# Patient Record
Sex: FEMALE | Race: WHITE | NOT HISPANIC OR LATINO | Employment: FULL TIME | URBAN - METROPOLITAN AREA
[De-identification: names, ages, dates, MRNs, and addresses within clinical notes are randomized per-mention and may not be internally consistent; named-entity substitution may affect disease eponyms.]

---

## 2021-03-19 PROBLEM — U07.1 PNEUMONIA DUE TO COVID-19 VIRUS: Status: ACTIVE | Noted: 2021-03-19

## 2021-03-19 PROBLEM — J12.82 PNEUMONIA DUE TO COVID-19 VIRUS: Status: ACTIVE | Noted: 2021-03-19

## 2023-12-22 ENCOUNTER — TELEPHONE (OUTPATIENT)
Dept: SURGERY | Facility: CLINIC | Age: 48
End: 2023-12-22
Payer: OTHER GOVERNMENT

## 2023-12-22 NOTE — TELEPHONE ENCOUNTER
"Spoke to pt about her "self made" appt, and she said that its regarding a colonoscopy she recently had at [facility], that is showing cancer. She will try to upload everything in portal, and if it doesn't load she will bring everything with her to appt.   "

## 2023-12-26 ENCOUNTER — TELEPHONE (OUTPATIENT)
Dept: SURGERY | Facility: CLINIC | Age: 48
End: 2023-12-26

## 2023-12-26 ENCOUNTER — TELEPHONE (OUTPATIENT)
Dept: SURGERY | Facility: CLINIC | Age: 48
End: 2023-12-26
Payer: OTHER GOVERNMENT

## 2023-12-26 ENCOUNTER — LAB VISIT (OUTPATIENT)
Dept: LAB | Facility: HOSPITAL | Age: 48
End: 2023-12-26
Attending: COLON & RECTAL SURGERY
Payer: OTHER GOVERNMENT

## 2023-12-26 ENCOUNTER — OFFICE VISIT (OUTPATIENT)
Dept: SURGERY | Facility: CLINIC | Age: 48
End: 2023-12-26
Payer: OTHER GOVERNMENT

## 2023-12-26 ENCOUNTER — PATIENT MESSAGE (OUTPATIENT)
Dept: SURGERY | Facility: CLINIC | Age: 48
End: 2023-12-26

## 2023-12-26 VITALS
WEIGHT: 175.94 LBS | TEMPERATURE: 98 F | OXYGEN SATURATION: 97 % | HEART RATE: 76 BPM | RESPIRATION RATE: 16 BRPM | DIASTOLIC BLOOD PRESSURE: 70 MMHG | SYSTOLIC BLOOD PRESSURE: 118 MMHG | BODY MASS INDEX: 25.19 KG/M2 | HEIGHT: 70 IN

## 2023-12-26 DIAGNOSIS — C18.7 MALIGNANT NEOPLASM OF SIGMOID COLON: Primary | ICD-10-CM

## 2023-12-26 DIAGNOSIS — Z85.42 HISTORY OF UTERINE CANCER: ICD-10-CM

## 2023-12-26 DIAGNOSIS — C18.7 MALIGNANT NEOPLASM OF SIGMOID COLON: ICD-10-CM

## 2023-12-26 LAB
ALBUMIN SERPL BCP-MCNC: 4.1 G/DL (ref 3.5–5.2)
ALP SERPL-CCNC: 65 U/L (ref 55–135)
ALT SERPL W/O P-5'-P-CCNC: 15 U/L (ref 10–44)
ANION GAP SERPL CALC-SCNC: 10 MMOL/L (ref 8–16)
AST SERPL-CCNC: 26 U/L (ref 10–40)
BASOPHILS # BLD AUTO: 0.02 K/UL (ref 0–0.2)
BASOPHILS NFR BLD: 0.4 % (ref 0–1.9)
BILIRUB SERPL-MCNC: 0.8 MG/DL (ref 0.1–1)
BUN SERPL-MCNC: 13 MG/DL (ref 6–20)
CALCIUM SERPL-MCNC: 9.2 MG/DL (ref 8.7–10.5)
CEA SERPL-MCNC: 2.2 NG/ML (ref 0–5)
CHLORIDE SERPL-SCNC: 107 MMOL/L (ref 95–110)
CO2 SERPL-SCNC: 25 MMOL/L (ref 23–29)
CREAT SERPL-MCNC: 0.9 MG/DL (ref 0.5–1.4)
DIFFERENTIAL METHOD: ABNORMAL
EOSINOPHIL # BLD AUTO: 0.1 K/UL (ref 0–0.5)
EOSINOPHIL NFR BLD: 1.1 % (ref 0–8)
ERYTHROCYTE [DISTWIDTH] IN BLOOD BY AUTOMATED COUNT: 12.7 % (ref 11.5–14.5)
EST. GFR  (NO RACE VARIABLE): >60 ML/MIN/1.73 M^2
GLUCOSE SERPL-MCNC: 99 MG/DL (ref 70–110)
HCT VFR BLD AUTO: 38.2 % (ref 37–48.5)
HGB BLD-MCNC: 12.9 G/DL (ref 12–16)
IMM GRANULOCYTES # BLD AUTO: 0.01 K/UL (ref 0–0.04)
IMM GRANULOCYTES NFR BLD AUTO: 0.2 % (ref 0–0.5)
LYMPHOCYTES # BLD AUTO: 0.9 K/UL (ref 1–4.8)
LYMPHOCYTES NFR BLD: 16.9 % (ref 18–48)
MCH RBC QN AUTO: 29.6 PG (ref 27–31)
MCHC RBC AUTO-ENTMCNC: 33.8 G/DL (ref 32–36)
MCV RBC AUTO: 88 FL (ref 82–98)
MONOCYTES # BLD AUTO: 0.4 K/UL (ref 0.3–1)
MONOCYTES NFR BLD: 7.1 % (ref 4–15)
NEUTROPHILS # BLD AUTO: 3.9 K/UL (ref 1.8–7.7)
NEUTROPHILS NFR BLD: 74.3 % (ref 38–73)
NRBC BLD-RTO: 0 /100 WBC
PLATELET # BLD AUTO: 282 K/UL (ref 150–450)
PMV BLD AUTO: 9.6 FL (ref 9.2–12.9)
POTASSIUM SERPL-SCNC: 4.3 MMOL/L (ref 3.5–5.1)
PROT SERPL-MCNC: 6.9 G/DL (ref 6–8.4)
RBC # BLD AUTO: 4.36 M/UL (ref 4–5.4)
SODIUM SERPL-SCNC: 142 MMOL/L (ref 136–145)
WBC # BLD AUTO: 5.22 K/UL (ref 3.9–12.7)

## 2023-12-26 PROCEDURE — 80053 COMPREHEN METABOLIC PANEL: CPT | Mod: PN | Performed by: COLON & RECTAL SURGERY

## 2023-12-26 PROCEDURE — 99204 PR OFFICE/OUTPT VISIT, NEW, LEVL IV, 45-59 MIN: ICD-10-PCS | Mod: S$GLB,,, | Performed by: COLON & RECTAL SURGERY

## 2023-12-26 PROCEDURE — 85025 COMPLETE CBC W/AUTO DIFF WBC: CPT | Mod: PN | Performed by: COLON & RECTAL SURGERY

## 2023-12-26 PROCEDURE — 36415 COLL VENOUS BLD VENIPUNCTURE: CPT | Mod: PN | Performed by: COLON & RECTAL SURGERY

## 2023-12-26 PROCEDURE — 99999 PR PBB SHADOW E&M-EST. PATIENT-LVL III: CPT | Mod: PBBFAC,,, | Performed by: COLON & RECTAL SURGERY

## 2023-12-26 PROCEDURE — 82378 CARCINOEMBRYONIC ANTIGEN: CPT | Performed by: COLON & RECTAL SURGERY

## 2023-12-26 PROCEDURE — 99204 OFFICE O/P NEW MOD 45 MIN: CPT | Mod: S$GLB,,, | Performed by: COLON & RECTAL SURGERY

## 2023-12-26 PROCEDURE — 99999 PR PBB SHADOW E&M-EST. PATIENT-LVL III: ICD-10-PCS | Mod: PBBFAC,,, | Performed by: COLON & RECTAL SURGERY

## 2023-12-26 RX ORDER — NITROFURANTOIN 25; 75 MG/1; MG/1
100 CAPSULE ORAL 2 TIMES DAILY
COMMUNITY
Start: 2023-09-07

## 2023-12-26 RX ORDER — POLYETHYLENE GLYCOL 3350, SODIUM SULFATE ANHYDROUS, SODIUM BICARBONATE, SODIUM CHLORIDE, POTASSIUM CHLORIDE 236; 22.74; 6.74; 5.86; 2.97 G/4L; G/4L; G/4L; G/4L; G/4L
4 POWDER, FOR SOLUTION ORAL ONCE
COMMUNITY
End: 2023-12-26 | Stop reason: SDUPTHER

## 2023-12-26 RX ORDER — POLYETHYLENE GLYCOL 3350, SODIUM SULFATE ANHYDROUS, SODIUM BICARBONATE, SODIUM CHLORIDE, POTASSIUM CHLORIDE 236; 22.74; 6.74; 5.86; 2.97 G/4L; G/4L; G/4L; G/4L; G/4L
4 POWDER, FOR SOLUTION ORAL ONCE
Qty: 4000 ML | Refills: 0 | Status: SHIPPED | OUTPATIENT
Start: 2023-12-26 | End: 2024-01-08

## 2023-12-26 RX ORDER — ESTRADIOL 10 UG/1
INSERT VAGINAL
COMMUNITY
Start: 2023-09-07

## 2023-12-26 NOTE — PROGRESS NOTES
CRS Office/In-Patient History and Physical    Referring MD:   Self, Aaareferral  No address on file    SUBJECTIVE:     History of Present Illness:  Patient is a 48 y.o. female with Hx of uterine cancer at age 24 managed with RT followed by  lymphadenectomy and relocation of ovaries (1999). Lymph nodes + cancer and she tolerated postoperative chemotherapy with cure. She had a normal colonoscopy in 2018, but + flat adenomatous lesion at 30-40 cm noted in September 2022, not able to be safely removed by endoscopy- not a candidate for EMR/ESD. Recent repeat Bx confirmed invasive carcinoma, Tis. She had previously been turned down for elective resection and now presents for a 2nd opinion. She has CT scan, labs and Hem/Onc referral locally in January. Family Hx + CRC in Grandfather at age 67. Medical records hand-delivered by the patient.     Last Colonoscopy: 2023      Past Medical History:   Diagnosis Date    Cervical cancer        Past Surgical History:   Procedure Laterality Date    CHOLECYSTECTOMY      HYSTERECTOMY         Review of patient's allergies indicates:   Allergen Reactions    Codeine Other (See Comments)     Per patient, causes pancreatitis       Current Outpatient Medications on File Prior to Visit   Medication Sig Dispense Refill    nitrofurantoin, macrocrystal-monohydrate, (MACROBID) 100 MG capsule Take 100 mg by mouth 2 (two) times daily.      VAGIFEM 10 mcg Tab Place vaginally.      [DISCONTINUED] colchicine (COLCRYS) 0.6 mg tablet Take 1 tablet (0.6 mg total) by mouth once daily. 14 tablet 0    [DISCONTINUED] lopinavir-ritonavir 200-50 mg (KALETRA) 200-50 mg Tab Take 2 tablets by mouth once daily. 7 tablet 0    [DISCONTINUED] methylPREDNISolone (MEDROL DOSEPACK) 4 mg tablet As directed on dose pack 1 Package 0     No current facility-administered medications on file prior to visit.       No family history on file.    Social History     Tobacco Use    Smoking status: Never    Smokeless tobacco: Never  "  Substance Use Topics    Drug use: Never        Review of Systems:  Constitutional: Negative for fever, appetite change and unexpected weight change.  HENT: Negative for sore throat and trouble swallowing.  Eyes: Negative for visual disturbance.  Respiratory: Negative for chest tightness, shortness of breath and wheezing.  Cardiovascular: Negative for chest pain.  Gastrointestinal:  as per HPI  Genitourinary: Negative for dysuria, frequency and hematuria.  Musculoskeletal: Negative for myalgias, joint swelling and arthralgias.  Skin: Negative for color change and rash.   Neurological: Negative for syncope, weakness and headaches.  Psychiatric/Behavioral: Negative for confusion.      OBJECTIVE:     Vital Signs (Most Recent)  /70 (BP Location: Left arm, Patient Position: Sitting, BP Method: Medium (Manual))   Pulse 76   Temp 97.7 °F (36.5 °C) (Temporal)   Resp 16   Ht 5' 10" (1.778 m)   Wt 79.8 kg (175 lb 14.8 oz)   SpO2 97%   BMI 25.24 kg/m²            Physical Exam:  GENERAL:  Comfortable, in no acute distress.      SKIN: Non-jaundiced  EXTREMITIES:  No edema.     NEURO: CN II-XII intact; motor/sensory non-focal.                            HEENT EXAM:  Nonicteric.  No adenopathy.  Oropharynx is clear.               NECK:  Supple.                                                               LUNGS:  Clear.                                                               CARDIAC:  Regular rate and rhythm.  S1, S2.  No murmur.                      ABDOMEN:  Soft, positive bowel sounds, nontender.  No hepatosplenomegaly or masses.  No rebound or guarding.  + Pfannenstiel scar and laparoscopy scars                                                  ASSESSMENT:     Encounter Diagnoses   Name Primary?    Malignant neoplasm of sigmoid colon Yes    History of uterine cancer           PLAN:   Agree with plan for surgical resection for this apparent early-stage sigmoid cancer (Tis) perhaps combined with TAHBSO. " Referral to Genetics for work-up (? Godwin Syndrome) along with GYN Onc for consideration of prophylactic resection of GYN organs. Labs drawn today. We will make the appropriate arrangements for her.

## 2023-12-27 ENCOUNTER — PATIENT MESSAGE (OUTPATIENT)
Dept: HEMATOLOGY/ONCOLOGY | Facility: CLINIC | Age: 48
End: 2023-12-27
Payer: OTHER GOVERNMENT

## 2023-12-27 ENCOUNTER — TELEPHONE (OUTPATIENT)
Dept: SURGERY | Facility: CLINIC | Age: 48
End: 2023-12-27
Payer: OTHER GOVERNMENT

## 2024-01-02 ENCOUNTER — TELEPHONE (OUTPATIENT)
Dept: GENETICS | Facility: CLINIC | Age: 49
End: 2024-01-02
Payer: COMMERCIAL

## 2024-01-08 ENCOUNTER — OFFICE VISIT (OUTPATIENT)
Dept: GENETICS | Facility: CLINIC | Age: 49
End: 2024-01-08
Payer: OTHER GOVERNMENT

## 2024-01-08 DIAGNOSIS — C18.7 MALIGNANT NEOPLASM OF SIGMOID COLON: ICD-10-CM

## 2024-01-08 PROCEDURE — 99499 UNLISTED E&M SERVICE: CPT | Mod: 95,,, | Performed by: INTERNAL MEDICINE

## 2024-01-08 RX ORDER — POLYETHYLENE GLYCOL 3350, SODIUM SULFATE ANHYDROUS, SODIUM BICARBONATE, SODIUM CHLORIDE, POTASSIUM CHLORIDE 236; 22.74; 6.74; 5.86; 2.97 G/4L; G/4L; G/4L; G/4L; G/4L
4 POWDER, FOR SOLUTION ORAL ONCE
COMMUNITY
End: 2024-01-08 | Stop reason: SDUPTHER

## 2024-01-08 RX ORDER — POLYETHYLENE GLYCOL 3350, SODIUM SULFATE ANHYDROUS, SODIUM BICARBONATE, SODIUM CHLORIDE, POTASSIUM CHLORIDE 236; 22.74; 6.74; 5.86; 2.97 G/4L; G/4L; G/4L; G/4L; G/4L
4 POWDER, FOR SOLUTION ORAL ONCE
Qty: 4000 ML | Refills: 0 | Status: SHIPPED | OUTPATIENT
Start: 2024-01-08 | End: 2024-01-08

## 2024-01-22 ENCOUNTER — TELEPHONE (OUTPATIENT)
Dept: GYNECOLOGIC ONCOLOGY | Facility: CLINIC | Age: 49
End: 2024-01-22
Payer: COMMERCIAL

## 2024-01-22 NOTE — TELEPHONE ENCOUNTER
Pt contacted per Dr Ballesteros's request. Dr Ballesteros will be doing a combined surgery with Dr Suazo on  on this pt. Pt called and name and  verified. Explained my role as nurse navigator and direct number provided. Pt updated that Dr Ballesteros GYN/ONC agreed to assist Dr Suazo with her surgery. Pt updated on Dr Lassiter clinic locations and soonest availability discussed with pt. Pt scheduled to see Dr Ballesteros in an available appointment on a date and location that worked best for her.  Post op appointment scheduled in conjunction with Dr Suazo. Patient encouraged to call for any questions or concerns about her care or appointments. Pt verbalized understanding no additional questions at this time.. Date time and location of appointment reviewed with pt.

## 2024-01-23 ENCOUNTER — TELEPHONE (OUTPATIENT)
Dept: SURGERY | Facility: CLINIC | Age: 49
End: 2024-01-23
Payer: COMMERCIAL

## 2024-01-24 NOTE — TELEPHONE ENCOUNTER
"Navigator updated that pt will have a colonoscopy the day prior to her surgery on 2/8/24. This procedure will serve in place of her "pre-op" testing.  "

## 2024-01-29 ENCOUNTER — TELEPHONE (OUTPATIENT)
Dept: SURGERY | Facility: CLINIC | Age: 49
End: 2024-01-29
Payer: COMMERCIAL

## 2024-01-29 ENCOUNTER — PATIENT MESSAGE (OUTPATIENT)
Dept: SURGERY | Facility: CLINIC | Age: 49
End: 2024-01-29
Payer: COMMERCIAL

## 2024-01-29 NOTE — TELEPHONE ENCOUNTER
Spoke with Dr Mathias -- peer to peer set up for Wednesday 1/31 at 10:30am    Dr Mathias [phone]      ----- Message from Brenda Blair sent at 1/29/2024  1:15 PM CST -----  Contact: self  Type:  Needs Medical Advice    Who Called: Dr. Mathias, Oncologist w/[facility]  Best Call Back Number: personal Cell [phone]  Dr. Mathias Oncologist w/[facility] trying to schedule a Peer to Peer consult w/Dr. Suazo  Call was transferred to Zaynab ALANIS. // Thank you...

## 2024-01-31 ENCOUNTER — PATIENT MESSAGE (OUTPATIENT)
Dept: SURGERY | Facility: CLINIC | Age: 49
End: 2024-01-31

## 2024-01-31 ENCOUNTER — OFFICE VISIT (OUTPATIENT)
Dept: SURGERY | Facility: CLINIC | Age: 49
End: 2024-01-31
Payer: COMMERCIAL

## 2024-01-31 ENCOUNTER — OFFICE VISIT (OUTPATIENT)
Dept: GENETICS | Facility: CLINIC | Age: 49
End: 2024-01-31
Payer: COMMERCIAL

## 2024-01-31 VITALS
BODY MASS INDEX: 24.61 KG/M2 | TEMPERATURE: 97 F | RESPIRATION RATE: 16 BRPM | OXYGEN SATURATION: 96 % | DIASTOLIC BLOOD PRESSURE: 78 MMHG | HEART RATE: 90 BPM | WEIGHT: 171.94 LBS | HEIGHT: 70 IN | SYSTOLIC BLOOD PRESSURE: 110 MMHG

## 2024-01-31 DIAGNOSIS — C18.7 CANCER OF SIGMOID COLON: Primary | ICD-10-CM

## 2024-01-31 DIAGNOSIS — C18.7 MALIGNANT NEOPLASM OF SIGMOID COLON: Primary | ICD-10-CM

## 2024-01-31 DIAGNOSIS — C78.7 METASTASES TO THE LIVER: ICD-10-CM

## 2024-01-31 DIAGNOSIS — Z80.0 FAMILY HISTORY OF COLON CANCER: ICD-10-CM

## 2024-01-31 PROBLEM — Z71.89 OSTOMY NURSE CONSULTATION: Status: ACTIVE | Noted: 2024-01-31

## 2024-01-31 PROCEDURE — 99214 OFFICE O/P EST MOD 30 MIN: CPT | Mod: S$GLB,,, | Performed by: COLON & RECTAL SURGERY

## 2024-01-31 PROCEDURE — 99999 PR PBB SHADOW E&M-EST. PATIENT-LVL III: CPT | Mod: PBBFAC,,, | Performed by: COLON & RECTAL SURGERY

## 2024-01-31 PROCEDURE — 99499 UNLISTED E&M SERVICE: CPT | Mod: 95,,, | Performed by: INTERNAL MEDICINE

## 2024-01-31 RX ORDER — ALPRAZOLAM 0.5 MG/1
0.5 TABLET ORAL 2 TIMES DAILY PRN
COMMUNITY
Start: 2024-01-17

## 2024-01-31 RX ORDER — POLYETHYLENE GLYCOL-3350 AND ELECTROLYTES 236; 6.74; 5.86; 2.97; 22.74 G/274.31G; G/274.31G; G/274.31G; G/274.31G; G/274.31G
4000 POWDER, FOR SOLUTION ORAL ONCE
Status: ON HOLD | COMMUNITY
Start: 2024-01-15 | End: 2024-05-13 | Stop reason: HOSPADM

## 2024-01-31 NOTE — PROGRESS NOTES
"  Cancer Genetics  Hereditary/High Risk Clinic  Department of Hematology and Oncology  [facility]        Date of Service:  2024  Provider:  Rm Mandel MS, [facility]    Patient Information  Name:  Collette Castro  :  1975  MRN:  [id number]        Referring Provider: Brenton Suazo MD     Televisit Information  The patient location is: [place], [place].  The chief complaint leading to consultation is: as below.  Visit type: audiovisual.  Face-to-face time with patient: approximately 30 minutes.  Approximately 135 minutes in total were spent on this encounter, which includes face-to-face time and non-face-to-face time preparing to see the patient (e.g., review of tests), obtaining and/or reviewing separately obtained history, documenting clinical information in the electronic or other health record, independently interpreting results (not separately reported) and communicating results to the patient/family/caregiver, or care coordination (not separately reported).  Each patient to whom he or she provides medical services by telemedicine is:  (1) informed of the relationship between the physician and patient and the respective role of any other health care provider with respect to management of the patient; and (2) notified that he or she may decline to receive medical services by telemedicine and may withdraw from such care at any time.       SUBJECTIVE:      Chief Complaint: Genetic Counseling    History of Present Illness (HPI):  Collette Castro ("Collette"), 48 y.o., assigned female sex at birth is new to the [facility] Department of Hematology and Oncology and to me.  She was referred by  Colon Surgery  for genetic cancer risk assessment given her personal history of colon cancer. At age 48 she was diagnosed with colon cancer. Surgery scheduled for next week. lymphadenectomy and relocation of ovaries for hormone preservation (). She had a normal colonoscopy in 2018, but + flat adenomatous " lesion at 30-40 cm noted in September 2022, not able to be safely removed by endoscopy. She was not a candidate for EMR/ESD. Repeat endoscopic Bx confirmed invasive carcinoma, Tis. She had previously been turned down for elective sigmoid resection and presented for a 2nd opinion 1 month ago. CEA level 2.2. Hem/Onc (Dr. Ishmael Mathias) ordered CT/MRI that revealed 4 liver metastases. She stated that she has a previous history of cervical cancer s/p lymphadenectomy and relocation of ovaries for hormone preservation (1999), with neoadjuvant chemotherapy.    Focused Medical History:   Germline cancer-genetic testing:  No  Cancer:  Yes  Cervical cancer (1999)  Colon cancer   Colonoscopy: Yes  Most recent colonoscopy: 06/21/2023  Colon polyp:  Yes  Mammogram: Yes  Most recent mammo: 04/2023  Breast MRI:  No  Other benign tumor:  Yes  Breast cysts  Pancreatitis:  Yes  Pancreatic cyst:  No    Focused Surgical History  Relocation of ovaries    Ancestry:  Ashkenazi Buddhist ancestry:  No  Paternal: Albanian, English  Maternal: Israeli, English    Focused Family History:  Consanguinity in ancestors:  No  Germline cancer-genetic testing in blood relatives:  No  Cancer in family:  Yes; there are no known blood relatives affected with cancer other than those mentioned in the pedigree below    Family Cancer Pedigree:        Review of Systems:  See HPI.        SUBJECTIVE:   Records Reviewed  Medical History  Problem List  Any pertinent, available Procedures and Pathology reports in both [facility] and [facility]    COUNSELING   Causes of cancer  Germline cancer genetic testing is the testing of genes associated with cancer, known as cancer susceptibility genes.  Just as these genes are inherited from parents, mutations in these genes can be inherited, as well.  A mutation in a cancer susceptibility gene adversely affects the gene's ability to prevent cancer; therefore, carriers of cancer susceptibility gene mutations may  be at increased risk for certain cancers.     Only 5-10% cancers are caused by a cancer susceptibility gene mutation, meaning the cancer is genetic/hereditary; rather, most cancers are sporadic.  Causes of sporadic cancers may include environmental risk factors, lifestyle risk factors, and non-modifiable risk factors.  It is important to note that members of a family often share not only their genetics but also risk factors including environmental and lifestyle risk factors, so cancers can be familial.    Mutations in MLH1, MSH2, MSH6, PSM2, and EPCAM are associated with Godwin syndrome. Typically, individuals with Godwin syndrome have increased risks for colorectal and endometrial cancer, as well as, ovarian, gastric, small bowel, ureter/renal pelvis, pancreatic, hepatobiliary tract, sebaceous neoplasms, brain, and prostate cancer. Discussed that mutations in other genes may increase the risks for these cancers.     Potential results of genetic testing, and their implications  Potential results of genetic testing include positive, negative, and variant of unknown significance (VUS).    A positive result indicates the presence of at least one clinically significant mutation, and the patient's associated cancer risks vary depending upon the cancer susceptibility gene(s) in which there is/are a mutation(s).  With a positive result, in some cases, depending upon the specific result and the patient's clinical history, modified risk management may be recommended, including measures for risk reduction and/or surveillance; however, modified management is not always an option.    A negative result indicates that no clinically significant mutations were identified in the gene(s) tested.    A VUS indicates that there is not presently enough data for the laboratory to make a determination as to whether the variant is clinically significant.  VUSs are not typically acted upon clinically.        Modified management may also be  recommended, even with a result of no or unknown significance, based upon risk assessment that incorporates the family history.       Genetic mutation inheritance  If  Collette tests positive for a mutation, her first-degree relatives would each have a 50% chance of having the same mutation, and other, more distantly related blood-relatives would also be at risk of having the same mutation.       Genetic discrimination  The Genetic Information Nondiscrimination Act (DEENA) is [place] federal legislation that provides some protections against use of an individual's genetic information by their health insurer and by their employer.  Title I of DEENA prohibits most health insurers from utilizing an individual's genetic information to make decisions regarding insurance eligibility or premium charges.  Title II of DEENA prohibits covered entities, including employers, from requesting the genetic information of employees and applicants.  DEENA does not protect individuals from genetic discrimination toward health insurance obtained through a job with the  or through the Federal Employees Health Benefits Plan; from genetic discrimination by employers with fewer than 15 employees or if employed by the ; or from genetic discrimination by any other type of policies/entities, including but not limited to life insurance, disability insurance, long-term care insurance,  benefits, and  Health Services benefits.     Genetic testing logistics  An outside laboratory would perform the testing after a blood sample is collected at [facility] or a saliva sample is collected by the patient at home.  With genetic testing, there is a potential for the patient to incur out-of-pocket costs.  Results can take 2-3 weeks.  Post-test genetic counseling can be conducted once the genetic testing results are available.     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 Collette's clinical history, including personal history and/or family history, is suggestive of a hereditary cancer syndrome in the family given the personal history of colon cancer diagnosed before age 50. Indication for testing: diagnostic and family history.     Offered germline cancer-genetic testing at this time versus deferring testing at this time or declining testing altogether.  Various test panel options were discussed. Collette decided to proceed with genetic testing through the [employer] 5-gene Godwin syndrome Panel and the 77-gene CancerNext-Expanded Cancer Panel.  Collette has provided her informed consent to proceed. A blood sample was collected today.     Questions were encouraged and answered to the patient's satisfaction, and she verbalized understanding of information and agreement with the plan.         Signed,    Rm Mandel MS, [facility]  Certified Genetic Counselor, Hereditary and High-Risk Clinic  Hematology/Oncology, [facility]    01/31/2024

## 2024-01-31 NOTE — PROGRESS NOTES
CRS Office/In-Patient History and Physical    Referring MD:   Sandra Al FNP  PCP - General, Internal Medicine    SUBJECTIVE:     History of Present Illness:  Patient is a 48 y.o. female with a Family Hx of CRC (Grandfather- age 67) and personal Hx of uterine cancer at age 24 managed with RT followed by lymphadenectomy and relocation of ovaries for hormone preservation (1999). Lymph nodes + cancer and she tolerated postoperative chemotherapy with cure. She had a normal colonoscopy in 2018, but + flat adenomatous lesion at 30-40 cm noted in September 2022, not able to be safely removed by endoscopy. She was not a candidate for EMR/ESD. Repeat endoscopic Bx confirmed invasive carcinoma, Tis. She had previously been turned down for elective sigmoid resection and presented for a 2nd opinion 1 month ago. CEA level 2.2. Hem/Onc (Dr. Ishmael Mathias) ordered CT/MRI that revealed 4 liver metastases- the largest 1.2 cm. CT/MRI hand-delivered by the patient and entered into the EHR. She presents for further discussion regarding surgical options.   Last Colonoscopy: 2023    I have reviewed the following images and labs:    Lab Results   Component Value Date    WBC 5.22 12/26/2023    HGB 12.9 12/26/2023    HCT 38.2 12/26/2023     12/26/2023    ALT 15 12/26/2023    AST 26 12/26/2023     12/26/2023    K 4.3 12/26/2023     12/26/2023    CREATININE 0.9 12/26/2023    BUN 13 12/26/2023    CO2 25 12/26/2023        Past Medical History:   Diagnosis Date    Cervical cancer        Past Surgical History:   Procedure Laterality Date    CHOLECYSTECTOMY      HYSTERECTOMY         Review of patient's allergies indicates:   Allergen Reactions    Codeine Other (See Comments)     Per patient, causes pancreatitis       Current Outpatient Medications on File Prior to Visit   Medication Sig Dispense Refill    ALPRAZolam (XANAX) 0.5 MG tablet Take 0.5 mg by mouth.      GAVILYTE-G 236-22.74-6.74 -5.86 gram suspension Take 4,000  "mLs by mouth once.      nitrofurantoin, macrocrystal-monohydrate, (MACROBID) 100 MG capsule Take 100 mg by mouth 2 (two) times daily.      VAGIFEM 10 mcg Tab Place vaginally.       No current facility-administered medications on file prior to visit.       No family history on file.    Social History     Tobacco Use    Smoking status: Never    Smokeless tobacco: Never   Substance Use Topics    Drug use: Never        Review of Systems:  Constitutional: Negative for fever, appetite change and unexpected weight change.  HENT: Negative for sore throat and trouble swallowing.  Eyes: Negative for visual disturbance.  Respiratory: Negative for chest tightness, shortness of breath and wheezing.  Cardiovascular: Negative for chest pain.  Gastrointestinal:  as per HPI  Genitourinary: Negative for dysuria, frequency and hematuria.  Musculoskeletal: Negative for myalgias, joint swelling and arthralgias.  Skin: Negative for color change and rash.   Neurological: Negative for syncope, weakness and headaches.  Psychiatric/Behavioral: Negative for confusion.      OBJECTIVE:     Vital Signs (Most Recent)  /78 (BP Location: Right arm, Patient Position: Sitting, BP Method: Medium (Automatic))   Pulse 90   Temp 97.3 °F (36.3 °C) (Temporal)   Resp 16   Ht 5' 10" (1.778 m)   Wt 78 kg (171 lb 15.3 oz)   SpO2 96%   BMI 24.67 kg/m²            Physical Exam:  GENERAL:  Comfortable, in no acute distress.      SKIN: Non-jaundiced  EXTREMITIES:  No edema.     NEURO: CN II-XII intact; motor/sensory non-focal.                            HEENT EXAM:  Nonicteric.  No adenopathy.  Oropharynx is clear.               NECK:  Supple.                                                               LUNGS:  Clear.                                                               CARDIAC:  Regular rate and rhythm.  S1, S2.  No murmur.                      ABDOMEN:  Soft, positive bowel sounds, nontender.  No hepatosplenomegaly or masses.  No rebound " or guarding. [place] scar.                                                   ASSESSMENT:     Encounter Diagnoses   Name Primary?    Cancer of sigmoid colon Yes    Metastases to the liver           PLAN:   Lengthy discussion with patient and Dr. Mathias (Med Onc) on speaker phone, regarding surgical options. I will also involve Surgical Oncology regarding the presumed metastatic liver lesions (? Bx) and treatment options, including upfront chemotherapy vs upfront surgery (liver or colon). For now, we will keep the surgical resection scheduled for 2/9/2024. Genetic testing today.

## 2024-02-01 ENCOUNTER — TELEPHONE (OUTPATIENT)
Dept: SURGERY | Facility: CLINIC | Age: 49
End: 2024-02-01
Payer: COMMERCIAL

## 2024-02-01 ENCOUNTER — DOCUMENTATION ONLY (OUTPATIENT)
Dept: HEMATOLOGY/ONCOLOGY | Facility: CLINIC | Age: 49
End: 2024-02-01
Payer: COMMERCIAL

## 2024-02-01 NOTE — TELEPHONE ENCOUNTER
Spoke with Collette and confirmed that [employer] message that was sent yesterday. Pt will send over [facility] papers shortly.     Pt voiced understanding of call -- answered questions to patient's satisfaction.     Advised pt to call with any questions or concerns.

## 2024-02-01 NOTE — PROGRESS NOTES
[facility]     Colorectal Liver Metastasis Tumor Board Note      Date: 02/01/2024    Case Overview: Mrs. Castro (48F, patient of Dr. Suazo) was initially diagnosed with sigmoid colon adecnocarcinoma in 12/2023 with synchronous liver metastases. Has not started systemic treatment yet.     Participants: medical oncology, surgical oncology, colorectal surgery, transplant surgeons, interventional radiology, and oncology navigator     Imaging Reviewed: MRI, CT 1/2024    Radiology Review: Several foci of restricted diffusion in the right hepatic lobe that correspond to hypoenhancing foci seen on CT 1/4/24.  No evidence of left hepatic lobe disease.  Prominent soft tissue nodule in the pelvis adjacent to the left iliac bone.  Additional areas of soft tissue thickening and calcification in the bilateral paracolic gutters.  Left CFA pseudoaneurysm.     Orders/Referrals: none placed.     Board Recommendations: Recommend imaging with PET/CT and follow up with surg onc in clinic.

## 2024-02-02 ENCOUNTER — TELEPHONE (OUTPATIENT)
Dept: SURGERY | Facility: CLINIC | Age: 49
End: 2024-02-02
Payer: COMMERCIAL

## 2024-02-02 ENCOUNTER — TELEPHONE (OUTPATIENT)
Dept: HEMATOLOGY/ONCOLOGY | Facility: CLINIC | Age: 49
End: 2024-02-02
Payer: COMMERCIAL

## 2024-02-02 NOTE — TELEPHONE ENCOUNTER
LAUREN      ----- Message from Arcaeli Perez sent at 2/2/2024  8:16 AM CST -----  Type: Need Medical Advice   Who Called:Patient   Best callback number: [phone]  Additional Information: Patient ask for a callback from Zaynab to discuss conversation from yesterday  Please call to further assist, Thanks.

## 2024-02-05 NOTE — PROGRESS NOTES
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 She has been working up a sigmoid polyp/mass for about a year, with multiple biopsies negative for malignancy despite a high risk clinical lesion. Repeat colonoscopy and biopsies in December finally demonstrated adenocarcinoma.  She has hx uterine cancer at age 24 (1999) managed with preoperative RT and then hysterectomy/lymphadenectomy with ovarian preservation/relocation. Because of this history and her family history she has been getting screening colonoscopy every 3 years.    Family Hx of CRC (Grandfather- age 67)    Colonoscopy 12/23 with 2-3cm ulcerated sessile polyp.      CT/MRI:            On my review I count at least 5 discrete lesions, the largest of which is about 1cm in diameter in the right anterior liver. These are all in the right liver, with a potential 6th lesion on DWI in the left lateral liver. Her imaging reveals no other clear sites of metastatic disease. She has bilateral paracolic gutter "collections" which on my review are actually likely her relocated ovaries.    CEA: 2.2 (wnl)    PET: report and images pending, unfortunately her disc has only partial images    A/P:  Collette is 48, healthy, with hx pelvic RT and a new sigmoid colon cancer with liver metastases clinically. We had a long discussion on the phone recently and reviewed that discussion again here. Plan to start her on systemic therapy 4-6 cycles and review her response, and I have discussed her case with Dr. Mathias. We reviewed our likely options after induction systemic therapy. She has multifocal disease, mostly confined to the right liver. These are all high risk for disappearing lesions on chemo, and she has extremely high risk for in liver recurrence or inadequate surgical/interventional clearance. I see no evidence of extrahepatic disease. We discussed locoregional strategies including " resection, ablation, Y90 and importantly in her case, adjuvant DARRYL pump therapy and the potential role for liver transplant in the future should she have recurrent disease. Of these, given the distribution I think our most likely path is to proceed with primary tumor resection and liver clearance to the best of our ability with placement of DARRYL pump for adjuvant liver directed therapy for residual disease.    RTC after 4 cycles systemic therapy with restaging CT C/A/P panc/liver protocol and labs. She may benefit from MRI as well but will plan to evaluate her CT imaging first. Will obtain her PET for review, and re-present at [facility] conference after restaging.    I spent 60 minutes with Ms. Castro, with >50% of time spent face to face and additional time preparing to see the patient (eg, review of tests), obtaining and/or reviewing separately obtained history, documenting clinical information in the electronic or other health record, independently interpreting results (not separately reported) and communicating results to the patient/family/caregiver, or Care coordination (not separately reported).       Kimo Yuan MD  Upper GI / Hepatobiliary Surgical Oncology  [facility] [place], [place]  Office: [phone]  Fax: [phone]

## 2024-02-07 ENCOUNTER — OFFICE VISIT (OUTPATIENT)
Dept: HEMATOLOGY/ONCOLOGY | Facility: CLINIC | Age: 49
End: 2024-02-07
Payer: COMMERCIAL

## 2024-02-07 VITALS
WEIGHT: 170.63 LBS | BODY MASS INDEX: 24.43 KG/M2 | SYSTOLIC BLOOD PRESSURE: 122 MMHG | DIASTOLIC BLOOD PRESSURE: 84 MMHG | HEIGHT: 70 IN

## 2024-02-07 DIAGNOSIS — C78.7 METASTATIC COLON CANCER TO LIVER: Primary | ICD-10-CM

## 2024-02-07 DIAGNOSIS — C18.9 METASTATIC COLON CANCER TO LIVER: Primary | ICD-10-CM

## 2024-02-07 PROCEDURE — 99999 PR PBB SHADOW E&M-EST. PATIENT-LVL II: CPT | Mod: PBBFAC,,, | Performed by: SURGERY

## 2024-02-07 PROCEDURE — 99205 OFFICE O/P NEW HI 60 MIN: CPT | Mod: S$GLB,,, | Performed by: SURGERY

## 2024-02-08 ENCOUNTER — TELEPHONE (OUTPATIENT)
Dept: SURGERY | Facility: CLINIC | Age: 49
End: 2024-02-08
Payer: COMMERCIAL

## 2024-02-08 NOTE — TELEPHONE ENCOUNTER
Call placed to pt. Informed PET scan has been uploaded. Inquired if she has access to report. Pt states she thinks she can access previous PET reports but report from PET a few days ago has not been released. Will follow up with Dr. Mathias's office and request reports. Pt verbalized understanding.

## 2024-02-08 NOTE — TELEPHONE ENCOUNTER
"Call placed to Dr. Mathias office to request PET reports. Spoke to Lora. Reports Dr. Mathias does not attend clinic regularly at this location and was described as a 'floater".  She was unable to provide any alternate contact information for Dr. Mathias.   "

## 2024-02-09 ENCOUNTER — TELEPHONE (OUTPATIENT)
Dept: SURGERY | Facility: CLINIC | Age: 49
End: 2024-02-09
Payer: COMMERCIAL

## 2024-02-09 ENCOUNTER — TELEPHONE (OUTPATIENT)
Dept: HEMATOLOGY/ONCOLOGY | Facility: CLINIC | Age: 49
End: 2024-02-09
Payer: COMMERCIAL

## 2024-02-09 NOTE — TELEPHONE ENCOUNTER
----- Message from Yogi Norton sent at 2/9/2024  3:25 PM CST -----  Regarding: MRI Images.  Contact: [phone]  Hi, pt called to say she emailed the Provider some MRI's and would like for the provider to go over the images and call her back. Pls call the pt at  [phone] to discuss.  Thank you.

## 2024-02-09 NOTE — TELEPHONE ENCOUNTER
Pt calling to notify us that she has completed her PET scan and will be sending the results as soon as she has them.  Pt has had her port placed and will start chemotherapy next week.  Pt verbalized gratitude for her care.----- Message from Warner Vergara sent at 2/9/2024  4:34 PM CST -----  Regarding: Missed Call  Contact: [phone]  Pt calling to speak with someone in provider office regarding missed call.  Please call pt back at  [phone]

## 2024-02-12 ENCOUNTER — TELEPHONE (OUTPATIENT)
Dept: SURGERY | Facility: CLINIC | Age: 49
End: 2024-02-12
Payer: COMMERCIAL

## 2024-02-12 NOTE — TELEPHONE ENCOUNTER
----- Message from Mellisa Lazo RN sent at 2/9/2024  4:18 PM CST -----  Regarding: FW: MRI Images.  Contact: [phone]    ----- Message -----  From: Yogi Norton  Sent: 2/9/2024   3:26 PM CST  To: Lissy Malin Staff  Subject: MRI Images.                                      Hi, pt called to say she emailed the Provider some MRI's and would like for the provider to go over the images and call her back. Pls call the pt at  [phone] to discuss.  Thank you.

## 2024-02-14 ENCOUNTER — TELEPHONE (OUTPATIENT)
Dept: SURGERY | Facility: CLINIC | Age: 49
End: 2024-02-14
Payer: COMMERCIAL

## 2024-02-14 NOTE — TELEPHONE ENCOUNTER
Call placed to pt regarding  PET reports. Pt reports she will send the radiology reports later today. She also states she was assigned a  at [facility] but does not have the contact information near her. She will send us the contact info at a later date. Informed her the  should be able to be a point of contact for external records and to please send as soon as possible. Pt verbalized understanding.

## 2024-02-15 ENCOUNTER — TELEPHONE (OUTPATIENT)
Dept: SURGERY | Facility: CLINIC | Age: 49
End: 2024-02-15
Payer: COMMERCIAL

## 2024-02-15 NOTE — TELEPHONE ENCOUNTER
Call placed to pt to request  information, radiology reports and assistance obtaining external records. Left message with call back number.

## 2024-02-19 ENCOUNTER — TELEPHONE (OUTPATIENT)
Dept: SURGERY | Facility: CLINIC | Age: 49
End: 2024-02-19
Payer: COMMERCIAL

## 2024-02-19 NOTE — TELEPHONE ENCOUNTER
Call placed to pt. Pt reports her Manju Schrader [phone]. Pt reports her chemo started last week and developed CP and vomiting and was admitted. Pt will be admitted for next treatment on the 27th. Pt will send requested radiology reports later today. She also reports the best way to get in touch with Dr Cid office or  is via direct line to oncology  at [phone].

## 2024-02-19 NOTE — TELEPHONE ENCOUNTER
Call placed to OBI Nunes case manager at [facility] to request external records. Left message with call back number. Email sent to Manju with request for external records.

## 2024-02-23 ENCOUNTER — TELEPHONE (OUTPATIENT)
Dept: SURGERY | Facility: CLINIC | Age: 49
End: 2024-02-23
Payer: COMMERCIAL

## 2024-02-23 NOTE — TELEPHONE ENCOUNTER
Call placed to [facility]. Spoke to Kenyatta Chang. Requested external imaging and office notes from Dr. Cid. Mailing address and fax number provided. Kenyatta verbalized understanding.

## 2024-02-23 NOTE — TELEPHONE ENCOUNTER
Call placed to pt. Pt reports [facility] Will reach out to Dr. Yuan and request the forward them so they can be uploaded in her chart. Pt verbalized understanding.

## 2024-02-26 DIAGNOSIS — C18.7 MALIGNANT NEOPLASM OF SIGMOID COLON: Primary | ICD-10-CM

## 2024-03-22 ENCOUNTER — PATIENT MESSAGE (OUTPATIENT)
Dept: HEMATOLOGY/ONCOLOGY | Facility: CLINIC | Age: 49
End: 2024-03-22
Payer: COMMERCIAL

## 2024-03-26 ENCOUNTER — TELEPHONE (OUTPATIENT)
Dept: SURGERY | Facility: CLINIC | Age: 49
End: 2024-03-26
Payer: COMMERCIAL

## 2024-03-26 NOTE — TELEPHONE ENCOUNTER
Returned call. Spoke to pt. Informed pt her surgical plan of care will be finalized at her restaging appt. She mentioned that Dr. Mathias has reached out to Dr. Yuan regarding pt's tolerance to chemo. Per pt, she reports Dr. Mathias thinks pt may need to move forward with surgery and complete chemo after surgery. Pt reports she has orders for PET and MRI Liver and she will likely have them completed next week. Pt expressed her gratitude for Dr. Yuan's response to her inquiry and appreciates him taking the time to answer her questions. Will alert navigation team to pt's upcoming scans and instructed pt to continue to reach out for any concerns.

## 2024-03-26 NOTE — TELEPHONE ENCOUNTER
----- Message from Minal Johnson sent at 3/26/2024 10:47 AM CDT -----  Regarding: call back  Contact: [phone]  Pt returning call from Nydia please call to discuss Further

## 2024-04-08 ENCOUNTER — TELEPHONE (OUTPATIENT)
Dept: SURGERY | Facility: CLINIC | Age: 49
End: 2024-04-08
Payer: COMMERCIAL

## 2024-04-08 ENCOUNTER — PATIENT MESSAGE (OUTPATIENT)
Dept: SURGERY | Facility: CLINIC | Age: 49
End: 2024-04-08
Payer: COMMERCIAL

## 2024-04-08 ENCOUNTER — PATIENT MESSAGE (OUTPATIENT)
Dept: HEMATOLOGY/ONCOLOGY | Facility: CLINIC | Age: 49
End: 2024-04-08
Payer: COMMERCIAL

## 2024-04-15 ENCOUNTER — PATIENT MESSAGE (OUTPATIENT)
Dept: GENETICS | Facility: CLINIC | Age: 49
End: 2024-04-15
Payer: COMMERCIAL

## 2024-04-17 ENCOUNTER — PATIENT MESSAGE (OUTPATIENT)
Dept: HEMATOLOGY/ONCOLOGY | Facility: CLINIC | Age: 49
End: 2024-04-17

## 2024-04-17 ENCOUNTER — OFFICE VISIT (OUTPATIENT)
Dept: SURGERY | Facility: CLINIC | Age: 49
End: 2024-04-17
Payer: COMMERCIAL

## 2024-04-17 ENCOUNTER — DOCUMENTATION ONLY (OUTPATIENT)
Dept: SURGERY | Facility: CLINIC | Age: 49
End: 2024-04-17
Payer: COMMERCIAL

## 2024-04-17 ENCOUNTER — OFFICE VISIT (OUTPATIENT)
Dept: HEMATOLOGY/ONCOLOGY | Facility: CLINIC | Age: 49
End: 2024-04-17
Payer: COMMERCIAL

## 2024-04-17 VITALS
HEIGHT: 70 IN | DIASTOLIC BLOOD PRESSURE: 85 MMHG | WEIGHT: 166 LBS | BODY MASS INDEX: 23.77 KG/M2 | OXYGEN SATURATION: 97 % | TEMPERATURE: 98 F | SYSTOLIC BLOOD PRESSURE: 125 MMHG | HEART RATE: 92 BPM | RESPIRATION RATE: 16 BRPM

## 2024-04-17 VITALS
BODY MASS INDEX: 23.77 KG/M2 | HEIGHT: 70 IN | WEIGHT: 166 LBS | SYSTOLIC BLOOD PRESSURE: 125 MMHG | OXYGEN SATURATION: 97 % | DIASTOLIC BLOOD PRESSURE: 85 MMHG | HEART RATE: 92 BPM

## 2024-04-17 DIAGNOSIS — C78.7 METASTATIC COLON CANCER TO LIVER: ICD-10-CM

## 2024-04-17 DIAGNOSIS — C18.7 MALIGNANT NEOPLASM OF SIGMOID COLON: Primary | ICD-10-CM

## 2024-04-17 DIAGNOSIS — C18.9 METASTATIC COLON CANCER TO LIVER: ICD-10-CM

## 2024-04-17 DIAGNOSIS — C78.7 METASTASES TO THE LIVER: ICD-10-CM

## 2024-04-17 DIAGNOSIS — Z85.41 HISTORY OF CERVICAL CANCER: ICD-10-CM

## 2024-04-17 DIAGNOSIS — Z92.3 PERSONAL HISTORY OF IRRADIATION: ICD-10-CM

## 2024-04-17 PROCEDURE — 99999 PR PBB SHADOW E&M-EST. PATIENT-LVL IV: CPT | Mod: PBBFAC,,, | Performed by: COLON & RECTAL SURGERY

## 2024-04-17 PROCEDURE — 99205 OFFICE O/P NEW HI 60 MIN: CPT | Mod: S$GLB,,, | Performed by: COLON & RECTAL SURGERY

## 2024-04-17 PROCEDURE — 99999 PR PBB SHADOW E&M-EST. PATIENT-LVL III: CPT | Mod: PBBFAC,,, | Performed by: SURGERY

## 2024-04-17 PROCEDURE — 99214 OFFICE O/P EST MOD 30 MIN: CPT | Mod: S$GLB,,, | Performed by: SURGERY

## 2024-04-17 RX ORDER — ONDANSETRON 4 MG/1
TABLET, FILM COATED ORAL
Status: ON HOLD | COMMUNITY
Start: 2024-03-12 | End: 2024-05-09

## 2024-04-17 RX ORDER — PANTOPRAZOLE SODIUM 40 MG/1
TABLET, DELAYED RELEASE ORAL
Status: ON HOLD | COMMUNITY
Start: 2024-02-15 | End: 2024-05-09

## 2024-04-17 RX ORDER — AMLODIPINE BESYLATE 2.5 MG/1
TABLET ORAL
Status: ON HOLD | COMMUNITY
Start: 2024-02-27 | End: 2024-05-09

## 2024-04-17 RX ORDER — IBUPROFEN AND DIPHENHYDRAMINE CITRATE 200; 38 MG/1; MG/1
TABLET, COATED ORAL
Status: ON HOLD | COMMUNITY
Start: 2024-03-11 | End: 2024-05-13 | Stop reason: HOSPADM

## 2024-04-17 RX ORDER — PROCHLORPERAZINE MALEATE 10 MG
TABLET ORAL
Status: ON HOLD | COMMUNITY
Start: 2024-02-08 | End: 2024-05-09

## 2024-04-17 RX ORDER — NAPROXEN SODIUM 220 MG/1
TABLET, FILM COATED ORAL
Status: ON HOLD | COMMUNITY
Start: 2024-02-27 | End: 2024-05-09

## 2024-04-17 RX ORDER — METFORMIN HYDROCHLORIDE 500 MG/1
500 TABLET, EXTENDED RELEASE ORAL 2 TIMES DAILY WITH MEALS
COMMUNITY
Start: 2024-03-28

## 2024-04-17 NOTE — PROGRESS NOTES
Staff message sent to Chasity RODAS with request to add pt to [facility] conference agenda.

## 2024-04-17 NOTE — PROGRESS NOTES
HPI:  Collette Castro is a 48 y.o. female with history of colon CA, sigmod    History of cervical CA.  Cisplatin and XRT.   Surgery - Treated with pinning of ovaries upper abd, lymphadenectomy and XRT (interstitial, EBRT?) in her late 20's    Asymptomatic  Screening colonoscopies.        12-  Colonoscopy - [facility]    Staging workup revealed liver metastases.    1- CT scan:    Multiple liver hypodensities  Paracolic gutter soft tissue densities (likely ovaries per pt)      1-  MRI abdomen        *4 cycles of chemoRX with reactions after 1st cycle - see Dr Mathias note:      *Oxaliplatin stopped.  QOW 5 FU      *Excellent response with only one visible lesion seen - decreased in size     4-  PET         4-  MRI abdomen      4-  Interval hx:    Here today for consideration of colorectal resection  No abd pain.  No rectal bleeding.  No wt loss.    Good activity level.   No CP after Oxaliplatin stopped.       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    FANCC gene c.553C>T ( p.R185*) - [facility] Syndrome Analyses with [employer]-Expanded (77 genes)        Past Surgical History:   Procedure Laterality Date    CHOLECYSTECTOMY      HYSTERECTOMY         Review of patient's allergies indicates:   Allergen Reactions    Codeine Other (See Comments)     Per patient, causes pancreatitis    Hydrocodone bitartrate     Hydrocodone-acetaminophen Other (See Comments)    Tramadol hcl        Family History   Problem Relation Name Age of Onset    Colon cancer Maternal Grandfather  67    Lymphoma Maternal Cousin      Lung cancer Other         Social History     Socioeconomic History    Marital status:    Tobacco Use    Smoking status: Never    Smokeless tobacco: Never   Substance and Sexual Activity    Drug use: Never    Sexual activity: Yes     Partners: Male     Birth control/protection: See Surgical Hx  "      ROS:  GENERAL: No fever, chills, fatigability or weight loss.  Integument: No rashes, redness, icterus  CHEST: Denies VILLARREAL, cyanosis, wheezing, cough and sputum production.  CARDIOVASCULAR: Denies chest pain, PND, orthopnea or reduced exercise tolerance.  GI: Denies abd pain, dysphagia, nausea, vomiting, no hematemesis   : Denies burning on urination, no hematuria, no bacteriuria  MSK: No deformities, swelling, joint pain swelling  Neurologic: No HAs, seizures, weakness, paresthesias, gait problems    PE:  General appearance healthy  /85 (BP Location: Right arm, Patient Position: Sitting, BP Method: Medium (Automatic))   Pulse 92   Temp 97.6 °F (36.4 °C) (Temporal)   Resp 16   Ht 5' 10" (1.778 m)   Wt 75.3 kg (166 lb 0.1 oz)   SpO2 97%   BMI 23.82 kg/m²   Sclera/ Skin anicteric  LN none palpable  AT NC EOMI  Neck supple trachea midline   Chest symmetric, nl excursion, no retractions, breathing comfortably  Abdomen  ND soft NT.  No n masses, no organomegaly  EXT - no CCE  Neuro:  Mood/ affect nl, alert and oriented x 3, moves all ext's, gait nl    Assessment:  Colorectal CA - rectosigmoid, stage IV 4 lesions with excellent response to systemic chemoRX - off now for one month.    Reactions to FOLFOX   Good performance status  History of cervical CA and history of XRT, pelvic  Genetic testing performed    Plan:  Low anterior resection - general details, functional consequences and potential need for stoma discussed.   We did discuss potential XRT effects and impact on healing of anastomosis  Colonoscopy to confirm location of tumor  MDT to discuss metastatic disease and potential surgical treatment (resection vs ablation) and possible hepatic arterial pump.          "

## 2024-04-17 NOTE — Clinical Note
Hey can we put a VV for her on for Mon the 29th? I may touch base w her before that but just to have it on the calendar

## 2024-04-17 NOTE — H&P (VIEW-ONLY)
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 She has been working up a sigmoid polyp/mass for about a year, with multiple biopsies negative for malignancy despite a high risk clinical lesion. Repeat colonoscopy and biopsies in December finally demonstrated adenocarcinoma.  She has hx uterine cancer at age 24 (1999) managed with preoperative RT and then hysterectomy/lymphadenectomy with ovarian preservation/relocation. Because of this history and her family history she has been getting screening colonoscopy every 3 years.     Family Hx of CRC (Grandfather- age 67)     Colonoscopy 12/23 with 2-3cm ulcerated sessile polyp.       CT/MRI:               On my review I count at least 5 discrete lesions, the largest of which is about 1cm in diameter in the right anterior liver. These are all in the right liver, with a potential 6th lesion on DWI in the left lateral liver. Her imaging reveals no other clear sites of metastatic disease. She has bilateral paracolic gutter "collections" which on my review are actually likely her relocated ovaries.     CEA: 2.2 (wnl)     PET: report and images pending, unfortunately her disc has only partial images     A/P:  Collette is 48, healthy, with hx pelvic RT and a new sigmoid colon cancer with liver metastases clinically. We had a long discussion on the phone recently and reviewed that discussion again here. Plan to start her on systemic therapy 4-6 cycles and review her response, and I have discussed her case with Dr. Mathias. We reviewed our likely options after induction systemic therapy. She has multifocal disease, mostly confined to the right liver. These are all high risk for disappearing lesions on chemo, and she has extremely high risk for in liver recurrence or inadequate surgical/interventional clearance. I see no evidence of extrahepatic disease. We discussed locoregional strategies " including resection, ablation, Y90 and importantly in her case, adjuvant DARRYL pump therapy and the potential role for liver transplant in the future should she have recurrent disease. Of these, given the distribution I think our most likely path is to proceed with primary tumor resection and liver clearance to the best of our ability with placement of DARRYL pump for adjuvant liver directed therapy for residual disease.     RTC after 4 cycles systemic therapy with restaging CT C/A/P panc/liver protocol and labs. She may benefit from MRI as well but will plan to evaluate her CT imaging first. Will obtain her PET for review, and re-present at [facility] conference after restaging.  -------------------------  4/17/2024:    Collette returns after a tough few cycles of systemic therapy which she has really struggled with. Dr. Mathias has dropped her bolus 5-FU and she has had several delays due to thrombocytopenia, elevated LFTs and diarrhea.    Repeat MRI:      She has responded great, I can only see one lesion on DWI. Seen Dr. Paris to restage her rectosigmoid mass. I suspect the answer here is just to treat her sigmoid cancer and ablate this solitary liver lesion and hold off on DARRYL placement. She understands high risk of intrahepatic recurrence and potential need for further liver directed therapy.    Present at [facility]  F/u Dr. Paris flex sig and surgical planning. Will regroup after conference Thursday, I think a VV to keep her on the radar.    I spent 30 minutes with Ms. Castro, with >50% of time spent face to face and additional time preparing to see the patient (eg, review of tests), obtaining and/or reviewing separately obtained history, documenting clinical information in the electronic or other health record, independently interpreting results (not separately reported) and communicating results to the patient/family/caregiver, or Care coordination (not separately reported).           Kimo Yuan MD  Upper GI /  Hepatobiliary Surgical Oncology  [facility] [place], [place]  Office: [phone]  Fax: [phone]

## 2024-04-17 NOTE — PROGRESS NOTES
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 She has been working up a sigmoid polyp/mass for about a year, with multiple biopsies negative for malignancy despite a high risk clinical lesion. Repeat colonoscopy and biopsies in December finally demonstrated adenocarcinoma.  She has hx uterine cancer at age 24 (1999) managed with preoperative RT and then hysterectomy/lymphadenectomy with ovarian preservation/relocation. Because of this history and her family history she has been getting screening colonoscopy every 3 years.     Family Hx of CRC (Grandfather- age 67)     Colonoscopy 12/23 with 2-3cm ulcerated sessile polyp.       CT/MRI:               On my review I count at least 5 discrete lesions, the largest of which is about 1cm in diameter in the right anterior liver. These are all in the right liver, with a potential 6th lesion on DWI in the left lateral liver. Her imaging reveals no other clear sites of metastatic disease. She has bilateral paracolic gutter "collections" which on my review are actually likely her relocated ovaries.     CEA: 2.2 (wnl)     PET: report and images pending, unfortunately her disc has only partial images     A/P:  Collette is 48, healthy, with hx pelvic RT and a new sigmoid colon cancer with liver metastases clinically. We had a long discussion on the phone recently and reviewed that discussion again here. Plan to start her on systemic therapy 4-6 cycles and review her response, and I have discussed her case with Dr. Mathias. We reviewed our likely options after induction systemic therapy. She has multifocal disease, mostly confined to the right liver. These are all high risk for disappearing lesions on chemo, and she has extremely high risk for in liver recurrence or inadequate surgical/interventional clearance. I see no evidence of extrahepatic disease. We discussed locoregional strategies " including resection, ablation, Y90 and importantly in her case, adjuvant DARRYL pump therapy and the potential role for liver transplant in the future should she have recurrent disease. Of these, given the distribution I think our most likely path is to proceed with primary tumor resection and liver clearance to the best of our ability with placement of DARRYL pump for adjuvant liver directed therapy for residual disease.     RTC after 4 cycles systemic therapy with restaging CT C/A/P panc/liver protocol and labs. She may benefit from MRI as well but will plan to evaluate her CT imaging first. Will obtain her PET for review, and re-present at [facility] conference after restaging.  -------------------------  4/17/2024:    Collette returns after a tough few cycles of systemic therapy which she has really struggled with. Dr. Mathias has dropped her bolus 5-FU and she has had several delays due to thrombocytopenia, elevated LFTs and diarrhea.    Repeat MRI:      She has responded great, I can only see one lesion on DWI. Seen Dr. Paris to restage her rectosigmoid mass. I suspect the answer here is just to treat her sigmoid cancer and ablate this solitary liver lesion and hold off on DARRYL placement. She understands high risk of intrahepatic recurrence and potential need for further liver directed therapy.    Present at [facility]  F/u Dr. Paris flex sig and surgical planning. Will regroup after conference Thursday, I think a VV to keep her on the radar.    I spent 30 minutes with Ms. Castro, with >50% of time spent face to face and additional time preparing to see the patient (eg, review of tests), obtaining and/or reviewing separately obtained history, documenting clinical information in the electronic or other health record, independently interpreting results (not separately reported) and communicating results to the patient/family/caregiver, or Care coordination (not separately reported).           Kimo Yuan MD  Upper GI /  Hepatobiliary Surgical Oncology  [facility] [place], [place]  Office: [phone]  Fax: [phone]

## 2024-04-18 ENCOUNTER — TELEPHONE (OUTPATIENT)
Dept: SURGERY | Facility: CLINIC | Age: 49
End: 2024-04-18
Payer: COMMERCIAL

## 2024-04-18 NOTE — TELEPHONE ENCOUNTER
Call placed to pt. Reviewed VV appt details for 4/29. Requested pt to notify the office if she speaks to Dr. Yuan prior to 4/29 so the VV can be cancelled. Pt verbalized understanding,.

## 2024-04-19 ENCOUNTER — TELEPHONE (OUTPATIENT)
Dept: GENETICS | Facility: CLINIC | Age: 49
End: 2024-04-19
Payer: COMMERCIAL

## 2024-04-19 NOTE — TELEPHONE ENCOUNTER
----- Message from Rm Mandel CGC sent at 4/16/2024  3:43 PM CDT -----  Regarding: Genetics f/u appt  Leon Mace,    Can you please schedule Ms. Castro for a f/u appointment given her FANCC mutation?    Thank you,  Rm

## 2024-04-20 PROBLEM — Z92.3 PERSONAL HISTORY OF IRRADIATION: Status: ACTIVE | Noted: 2024-04-20

## 2024-04-20 PROBLEM — Z85.41 HISTORY OF CERVICAL CANCER: Status: ACTIVE | Noted: 2024-04-20

## 2024-04-24 ENCOUNTER — OFFICE VISIT (OUTPATIENT)
Dept: SURGERY | Facility: CLINIC | Age: 49
End: 2024-04-24
Payer: COMMERCIAL

## 2024-04-24 VITALS
BODY MASS INDEX: 23.95 KG/M2 | HEART RATE: 75 BPM | RESPIRATION RATE: 16 BRPM | SYSTOLIC BLOOD PRESSURE: 95 MMHG | HEIGHT: 70 IN | WEIGHT: 167.31 LBS | DIASTOLIC BLOOD PRESSURE: 65 MMHG

## 2024-04-24 DIAGNOSIS — C18.7 CANCER OF SIGMOID COLON: Primary | ICD-10-CM

## 2024-04-24 DIAGNOSIS — C78.7 METASTASIS TO LIVER: ICD-10-CM

## 2024-04-24 PROCEDURE — 45330 DIAGNOSTIC SIGMOIDOSCOPY: CPT | Mod: S$GLB,,, | Performed by: COLON & RECTAL SURGERY

## 2024-04-24 PROCEDURE — 99215 OFFICE O/P EST HI 40 MIN: CPT | Mod: 25,S$GLB,, | Performed by: COLON & RECTAL SURGERY

## 2024-04-24 PROCEDURE — 99999 PR PBB SHADOW E&M-EST. PATIENT-LVL IV: CPT | Mod: PBBFAC,,, | Performed by: COLON & RECTAL SURGERY

## 2024-04-24 NOTE — H&P (VIEW-ONLY)
HPI:  Collette Castro is a 48 y.o. female with history of colon CA, sigmod     History of cervical CA.  Cisplatin and XRT.   Surgery - Treated with pinning of ovaries upper abd, lymphadenectomy and XRT (interstitial, EBRT?) in her late 20's     Asymptomatic  Screening colonoscopies.         12-  Colonoscopy - [facility]    Staging workup revealed liver metastases.     1- CT scan:    Multiple liver hypodensities  Paracolic gutter soft tissue densities (likely ovaries per pt)        1-  MRI abdomen          *4 cycles of chemoRX with reactions after 1st cycle - see Dr Mathias note:       *Oxaliplatin stopped.  QOW 5 FU       *Excellent response with only one visible lesion seen - decreased in size      4-  PET           4-  MRI abdomen       4-  Interval hx:    Here today for consideration of colorectal resection  No abd pain.  No rectal bleeding.  No wt loss.    Good activity level.   No CP after Oxaliplatin stopped.       4-  Interval hx:  Again remains asymptomatic.  No abdominal pain or bleeding.         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 Analyses with CancerNext-Expanded (77 genes)               Past Surgical History:   Procedure Laterality Date    CHOLECYSTECTOMY        HYSTERECTOMY                   Review of patient's allergies indicates:   Allergen Reactions    Codeine Other (See Comments)       Per patient, causes pancreatitis    Hydrocodone bitartrate      Hydrocodone-acetaminophen Other (See Comments)    Tramadol hcl                  Family History   Problem Relation Name Age of Onset    Colon cancer Maternal Grandfather   67    Lymphoma Maternal Cousin        Lung cancer Other             Social History            Socioeconomic History    Marital status:    Tobacco Use    Smoking status: Never    Smokeless tobacco: Never  "  Substance and Sexual Activity    Drug use: Never    Sexual activity: Yes       Partners: Male       Birth control/protection: See Surgical Hx         ROS:  GENERAL: No fever, chills, fatigability or weight loss.  Integument: No rashes, redness, icterus  CHEST: Denies VILLARREAL, cyanosis, wheezing, cough and sputum production.  CARDIOVASCULAR: Denies chest pain, PND, orthopnea or reduced exercise tolerance.  GI: Denies abd pain, dysphagia, nausea, vomiting, no hematemesis   : Denies burning on urination, no hematuria, no bacteriuria  MSK: No deformities, swelling, joint pain swelling  Neurologic: No HAs, seizures, weakness, paresthesias, gait problems     PE:  General appearance healthy  /85 (BP Location: Right arm, Patient Position: Sitting, BP Method: Medium (Automatic))   Pulse 92   Temp 97.6 °F (36.4 °C) (Temporal)   Resp 16   Ht 5' 10" (1.778 m)   Wt 75.3 kg (166 lb 0.1 oz)   SpO2 97%   BMI 23.82 kg/m²   Sclera/ Skin anicteric  LN none palpable  AT NC EOMI  Neck supple trachea midline   Chest symmetric, nl excursion, no retractions, breathing comfortably  Abdomen  ND soft NT.  No n masses, no organomegaly  EXT - no CCE  Neuro:  Mood/ affect nl, alert and oriented x 3, moves all ext's, gait nl     Assessment:  Colorectal CA - rectosigmoid, stage IV 4 lesions with excellent response to systemic chemoRX - off now for one month.    Reactions to FOLFOX   Good performance status  History of cervical CA and history of XRT, pelvic  Genetic testing performed     Plan:  Detailed discussion with the patient and her .  Resection of the primary tumor has been recommended by the multidisciplinary tumor board for metastatic colorectal cancer given the excellent response to systemic chemotherapy.  The remaining hepatic metastasis will be treated with ablation, percutaneous.  Hepatic arterial pump will not be placed at this time based on the tumor board discussion.    For the primary tumor I have recommended " that she undergo robotic sigmoid colectomy with anastomosis.  The details of the procedure, functional consequences and potential need for stoma creation were discussed explicitly.  The risks of surgery including bleeding, infection, anastomotic leakage, need for reoperation, stoma creation and possible injury to surrounding organs such as the ureter or bladder were discussed explicitly with the patient and her .        Procedure note    Flexible sigmoidoscopy    Verbal consent obtained.     Indications:  History of sigmoid colon cancer    Post procedure diagnosis:  Same    Procedure:  Flexible sigmoidoscopy    Surgeon DAKSHA    Asst:  MA    Findings:     Evidence of chronic radiation change involving the upper rectum and sigmoid colon with loss of normal vascular pattern.  No areas of ulceration or stenosis.    There was no evidence of a tattoo or mucosal neoplasm in the distal 30 cm.      Technique in detail:  Timeout performed.  Pt placed in left lateral Blackwell position.  Lubrication with digital rectal exam revealed normal tone,  no palpable masses.  The endoscope was lubricated and the tip inserted into the anal canal.  The endoscope was advanced under direct vision to 40  cm.  Upon withdrawal the mucosa was meticulously inspected.  The pt tolerated the procedure well     Complications:  None    EBL:  None    Patient discharged from the office in stable condition

## 2024-04-24 NOTE — PROGRESS NOTES
HPI:  Collette Castro is a 48 y.o. female with history of colon CA, sigmod     History of cervical CA.  Cisplatin and XRT.   Surgery - Treated with pinning of ovaries upper abd, lymphadenectomy and XRT (interstitial, EBRT?) in her late 20's     Asymptomatic  Screening colonoscopies.         12-  Colonoscopy - [facility]    Staging workup revealed liver metastases.     1- CT scan:    Multiple liver hypodensities  Paracolic gutter soft tissue densities (likely ovaries per pt)        1-  MRI abdomen          *4 cycles of chemoRX with reactions after 1st cycle - see Dr Mathias note:       *Oxaliplatin stopped.  QOW 5 FU       *Excellent response with only one visible lesion seen - decreased in size      4-  PET           4-  MRI abdomen       4-  Interval hx:    Here today for consideration of colorectal resection  No abd pain.  No rectal bleeding.  No wt loss.    Good activity level.   No CP after Oxaliplatin stopped.       4-  Interval hx:  Again remains asymptomatic.  No abdominal pain or bleeding.         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 Analyses with CancerNext-Expanded (77 genes)               Past Surgical History:   Procedure Laterality Date    CHOLECYSTECTOMY        HYSTERECTOMY                   Review of patient's allergies indicates:   Allergen Reactions    Codeine Other (See Comments)       Per patient, causes pancreatitis    Hydrocodone bitartrate      Hydrocodone-acetaminophen Other (See Comments)    Tramadol hcl                  Family History   Problem Relation Name Age of Onset    Colon cancer Maternal Grandfather   67    Lymphoma Maternal Cousin        Lung cancer Other             Social History            Socioeconomic History    Marital status:    Tobacco Use    Smoking status: Never    Smokeless tobacco: Never  "  Substance and Sexual Activity    Drug use: Never    Sexual activity: Yes       Partners: Male       Birth control/protection: See Surgical Hx         ROS:  GENERAL: No fever, chills, fatigability or weight loss.  Integument: No rashes, redness, icterus  CHEST: Denies VILLARREAL, cyanosis, wheezing, cough and sputum production.  CARDIOVASCULAR: Denies chest pain, PND, orthopnea or reduced exercise tolerance.  GI: Denies abd pain, dysphagia, nausea, vomiting, no hematemesis   : Denies burning on urination, no hematuria, no bacteriuria  MSK: No deformities, swelling, joint pain swelling  Neurologic: No HAs, seizures, weakness, paresthesias, gait problems     PE:  General appearance healthy  /85 (BP Location: Right arm, Patient Position: Sitting, BP Method: Medium (Automatic))   Pulse 92   Temp 97.6 °F (36.4 °C) (Temporal)   Resp 16   Ht 5' 10" (1.778 m)   Wt 75.3 kg (166 lb 0.1 oz)   SpO2 97%   BMI 23.82 kg/m²   Sclera/ Skin anicteric  LN none palpable  AT NC EOMI  Neck supple trachea midline   Chest symmetric, nl excursion, no retractions, breathing comfortably  Abdomen  ND soft NT.  No n masses, no organomegaly  EXT - no CCE  Neuro:  Mood/ affect nl, alert and oriented x 3, moves all ext's, gait nl     Assessment:  Colorectal CA - rectosigmoid, stage IV 4 lesions with excellent response to systemic chemoRX - off now for one month.    Reactions to FOLFOX   Good performance status  History of cervical CA and history of XRT, pelvic  Genetic testing performed     Plan:  Detailed discussion with the patient and her .  Resection of the primary tumor has been recommended by the multidisciplinary tumor board for metastatic colorectal cancer given the excellent response to systemic chemotherapy.  The remaining hepatic metastasis will be treated with ablation, percutaneous.  Hepatic arterial pump will not be placed at this time based on the tumor board discussion.    For the primary tumor I have recommended " that she undergo robotic sigmoid colectomy with anastomosis.  The details of the procedure, functional consequences and potential need for stoma creation were discussed explicitly.  The risks of surgery including bleeding, infection, anastomotic leakage, need for reoperation, stoma creation and possible injury to surrounding organs such as the ureter or bladder were discussed explicitly with the patient and her .        Procedure note    Flexible sigmoidoscopy    Verbal consent obtained.     Indications:  History of sigmoid colon cancer    Post procedure diagnosis:  Same    Procedure:  Flexible sigmoidoscopy    Surgeon DAKSHA    Asst:  MA    Findings:     Evidence of chronic radiation change involving the upper rectum and sigmoid colon with loss of normal vascular pattern.  No areas of ulceration or stenosis.    There was no evidence of a tattoo or mucosal neoplasm in the distal 30 cm.      Technique in detail:  Timeout performed.  Pt placed in left lateral Blackwell position.  Lubrication with digital rectal exam revealed normal tone,  no palpable masses.  The endoscope was lubricated and the tip inserted into the anal canal.  The endoscope was advanced under direct vision to 40  cm.  Upon withdrawal the mucosa was meticulously inspected.  The pt tolerated the procedure well     Complications:  None    EBL:  None    Patient discharged from the office in stable condition

## 2024-04-25 ENCOUNTER — DOCUMENTATION ONLY (OUTPATIENT)
Dept: HEMATOLOGY/ONCOLOGY | Facility: CLINIC | Age: 49
End: 2024-04-25
Payer: COMMERCIAL

## 2024-04-25 PROBLEM — C78.7 METASTASIS TO LIVER: Status: ACTIVE | Noted: 2024-04-25

## 2024-04-25 NOTE — PROGRESS NOTES
[facility]     Colorectal Liver Metastasis Tumor Board Note      Date: 04/25/2024    Referring Provider: Dr. Yuan    Case Overview: Mrs. Castro (48F) was initially diagnosed in 12/2023 with sigmoid adenocarcinoma with synchronous liver metastasis. She started FOLFOX in 02/2024 with modifications after adverse reaction to cycle 1. Has germline mutation of FANCC gene.     Participants: medical oncology, surgical oncology, colorectal surgery, transplant surgeons, interventional radiology, and oncology navigator     Imaging Reviewed: MRI, PET     Radiology Review: Interval decrease in size and number of liver lesions with only one discernible lesion measuring 0.7 cm, previously 1.4 cm    Orders/Referrals: none placed    Board Recommendations: Consider primary tumor resection + liver ablation. Recommend adjuvant chemotherapy (potentially maintenance regimen for tolerance).

## 2024-04-29 ENCOUNTER — PATIENT MESSAGE (OUTPATIENT)
Dept: HEMATOLOGY/ONCOLOGY | Facility: CLINIC | Age: 49
End: 2024-04-29
Payer: COMMERCIAL

## 2024-04-29 ENCOUNTER — OFFICE VISIT (OUTPATIENT)
Dept: SURGERY | Facility: CLINIC | Age: 49
End: 2024-04-29
Payer: COMMERCIAL

## 2024-04-29 ENCOUNTER — TELEPHONE (OUTPATIENT)
Dept: SURGERY | Facility: CLINIC | Age: 49
End: 2024-04-29
Payer: COMMERCIAL

## 2024-04-29 DIAGNOSIS — C18.9 METASTATIC COLON CANCER TO LIVER: Primary | ICD-10-CM

## 2024-04-29 DIAGNOSIS — C78.7 METASTATIC COLON CANCER TO LIVER: Primary | ICD-10-CM

## 2024-04-29 DIAGNOSIS — C18.7 MALIGNANT NEOPLASM OF SIGMOID COLON: Primary | ICD-10-CM

## 2024-04-29 PROCEDURE — 99499 UNLISTED E&M SERVICE: CPT | Mod: 95,,, | Performed by: SURGERY

## 2024-04-29 NOTE — TELEPHONE ENCOUNTER
----- Message from Kimo Yuan MD sent at 4/29/2024  3:09 PM CDT -----  Wade this is that young woman I talked about. Great response, has a single small lesion with a positive arrow sign on the MRI. I am going to take a look on 5/9 in the OR with [place] but I suspect it is best ablated percutaneous and was hoping to try and get her in at least for the initial IR consult appt before her surgery. She knows to look out for a call.    Nydia can you place referral?  
N/A

## 2024-04-29 NOTE — PROGRESS NOTES
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 She has been working up a sigmoid polyp/mass for about a year, with multiple biopsies negative for malignancy despite a high risk clinical lesion. Repeat colonoscopy and biopsies in December finally demonstrated adenocarcinoma.  She has hx uterine cancer at age 24 (1999) managed with preoperative RT and then hysterectomy/lymphadenectomy with ovarian preservation/relocation. Because of this history and her family history she has been getting screening colonoscopy every 3 years.     Family Hx of CRC (Grandfather- age 67)     Colonoscopy 12/23 with 2-3cm ulcerated sessile polyp.       CT/MRI:               On my review I count at least 5 discrete lesions, the largest of which is about 1cm in diameter in the right anterior liver. These are all in the right liver, with a potential 6th lesion on DWI in the left lateral liver. Her imaging reveals no other clear sites of metastatic disease. She has bilateral paracolic gutter "collections" which on my review are actually likely her relocated ovaries.     CEA: 2.2 (wnl)     PET: report and images pending, unfortunately her disc has only partial images     A/P:  Collette is 48, healthy, with hx pelvic RT and a new sigmoid colon cancer with liver metastases clinically. We had a long discussion on the phone recently and reviewed that discussion again here. Plan to start her on systemic therapy 4-6 cycles and review her response, and I have discussed her case with Dr. Mathias. We reviewed our likely options after induction systemic therapy. She has multifocal disease, mostly confined to the right liver. These are all high risk for disappearing lesions on chemo, and she has extremely high risk for in liver recurrence or inadequate surgical/interventional clearance. I see no evidence of extrahepatic disease. We discussed locoregional strategies " including resection, ablation, Y90 and importantly in her case, adjuvant DARRYL pump therapy and the potential role for liver transplant in the future should she have recurrent disease. Of these, given the distribution I think our most likely path is to proceed with primary tumor resection and liver clearance to the best of our ability with placement of DARRYL pump for adjuvant liver directed therapy for residual disease.     RTC after 4 cycles systemic therapy with restaging CT C/A/P panc/liver protocol and labs. She may benefit from MRI as well but will plan to evaluate her CT imaging first. Will obtain her PET for review, and re-present at [facility] conference after restaging.  -------------------------  4/17/2024:     Collette returns after a tough few cycles of systemic therapy which she has really struggled with. Dr. Mathias has dropped her bolus 5-FU and she has had several delays due to thrombocytopenia, elevated LFTs and diarrhea.     Repeat MRI:       She has responded great, I can only see one lesion on DWI. Seen Dr. Paris to restage her rectosigmoid mass. I suspect the answer here is just to treat her sigmoid cancer and ablate this solitary liver lesion and hold off on DARRYL placement. She understands high risk of intrahepatic recurrence and potential need for further liver directed therapy.     Present at [facility]  F/u Dr. Paris flex sig and surgical planning. Will regroup after conference Thursday, I think a VV to keep her on the radar.  ----------------------------------------  4/29/2024:    Collette has been presented at [facility] and primary colorectal conference. Will proceed with MWA of her lone remaining liver tumor and move forward with primary resection. Cont to follow her liver closely.      Kimo Yuan MD  Upper GI / Hepatobiliary Surgical Oncology  [facility] [place], [place]  Office: [phone]  Fax: [phone]

## 2024-04-29 NOTE — TELEPHONE ENCOUNTER
Call placed to pt regarding portal message. Advised pt she can log into VV and once logged in Dr. Yuan will join VV. Pt verbalized understanding.

## 2024-05-02 ENCOUNTER — OFFICE VISIT (OUTPATIENT)
Dept: GENETICS | Facility: CLINIC | Age: 49
End: 2024-05-02
Payer: COMMERCIAL

## 2024-05-02 DIAGNOSIS — C18.7 MALIGNANT NEOPLASM OF SIGMOID COLON: ICD-10-CM

## 2024-05-02 DIAGNOSIS — C78.7 METASTASIS TO LIVER: ICD-10-CM

## 2024-05-02 DIAGNOSIS — Z15.89 MONOALLELIC MUTATION OF FANCC GENE: Primary | ICD-10-CM

## 2024-05-02 PROCEDURE — 96040 PR GENETIC COUNSELING, EACH 30 MIN: CPT | Mod: 95,,,

## 2024-05-02 NOTE — PROGRESS NOTES
"  Cancer Genetics  Hereditary/High Risk Clinic  Department of Hematology and Oncology  [facility]        Date of Service:  2024  Provider:  Rm Mandel MS, [facility]    Patient Information  Name:  Collette Castro  :  1975  MRN:  [id number]        Referring Provider: Brenton Suazo MD     Televisit Information  The patient location is: [place], [place].  The chief complaint leading to consultation is: as below.  Visit type: audiovisual.  Face-to-face time with patient: approximately 25 minutes.  Approximately 60 minutes in total were spent on this encounter, which includes face-to-face time and non-face-to-face time preparing to see the patient (e.g., review of tests), obtaining and/or reviewing separately obtained history, documenting clinical information in the electronic or other health record, independently interpreting results (not separately reported) and communicating results to the patient/family/caregiver, or care coordination (not separately reported).  Each patient to whom he or she provides medical services by telemedicine is:  (1) informed of the relationship between the physician and patient and the respective role of any other health care provider with respect to management of the patient; and (2) notified that he or she may decline to receive medical services by telemedicine and may withdraw from such care at any time.      SUBJECTIVE:      Chief Complaint: Post-Test Genetic Counseling    History of Present Illness (HPI):  Collette Castro ("Collette"), 49 y.o., assigned female sex at birth is returning for post-test genetic counseling.  She initially presented on 2024 after being referred by Colon Surgery  for genetic cancer risk assessment given her personal history of colon cancer. At age 48, she was diagnosed with colon cancer. She had a normal colonoscopy in , but + flat adenomatous lesion at 30-40 cm noted in 2022, not able to be safely removed by " endoscopy. She was not a candidate for EMR/ESD. Repeat endoscopic Bx confirmed invasive carcinoma, Tis. She had previously been turned down for elective sigmoid resection and presented for a 2nd opinion. CEA level revelaed to be 2.2. Hem/Onc (Dr. Ishmael Mathias) ordered CT/MRI that revealed 4 liver metastases. She stated that she has a previous history of cervical cancer s/p lymphadenectomy and relocation of ovaries for hormone preservation (1999), with neoadjuvant chemotherapy. She recently underwent genetic testing, which revealed a pathogenic germline FANCC mutation. We met today to discuss the results.    Focused Medical History:   Germline cancer-genetic testing:  Yes - [facility]win Syndrome Analyses with CancerNext-Expanded (77 genes)  FANCC gene c.553C>T (p.R185*) - pathogenic  Cancer:  Yes  Cervical cancer (1999)  Colon cancer (2022)  Colonoscopy: Yes  Most recent colonoscopy: 06/21/2023  Colon polyp:  Yes  Mammogram: Yes  Most recent mammo: 04/2023  Breast MRI:  No  Other benign tumor:  Yes  Breast cysts  Pancreatitis:  Yes  Pancreatic cyst:  No     Focused Surgical History  Relocation of ovaries     Ancestry:  Ashkenazi Anabaptist ancestry:  No  Paternal: Turkish, English  Maternal: Divehi, English     Focused Family History:  Consanguinity in ancestors:  No  Germline cancer-genetic testing in blood relatives:  No  Cancer in family:  Yes; there are no known blood relatives affected with cancer other than those mentioned in the pedigree below    Genetic Test Results:        Review of Systems:  See HPI.      SUBJECTIVE:   Records Reviewed  Medical History  Problem List  Any pertinent, available Procedures and Pathology reports in both [facility] and [facility]    COUNSELING   Due to her personal and family history of cancer, she underwent genetic testing in February 2024. The BRCA1/2 Analyses with CancerNext Expanded +RNAinsight was ordered, which investigated the 77 genes.     Results are POSITIVE  for a heterozygous germline pathogenic mutation in the FANCC gene c.553C>T (p.R185*). This result does not explain her personal and family history of cancer. No pathogenic mutations were identified in any of the other 76 genes investigated.      FANCC Information  Background information: BRCA1 is a gene that, when functioning normally, helps protect against cancer. However, variants (differences) in the gene can prevent it from working properly, leading to a higher risk of certain types of cancer.    Current data do not show an increased risk of cancer over individuals in the average population. Currently, there are no recommendations for cancer screenings/management based on a single, monoallelic mutation. All cancer screenings should be based on personal and family history of cancer.     Risk to Relatives:   Individuals typically have two copies of the FANCC gene - one copy from each biological parent. If a parent has a mutation in the FANCC gene, there is a 50% chance each child will inherit the mutation. It is likely that Collette inherited this mutation from one parent (although we cannot tell from this testing which parent it was inherited from). As such, each of Collette's siblings and children is expected to have a 50% chance of having the same FANCC mutation. More distant relatives are also at risk of having the familial FANCC mutation. Relatives of someone with a FANCC mutation should consider meeting with a genetic specialist to discuss testing for the familial mutation.      It is rare but possible that an individual has a new (de sanket) mutation not inherited from either parent. If a mutation is de sanket, the chance that siblings and more distant relatives would have the familial mutation is much lower.       Fanconi Anemia  Typically, having mutations in both copies of the FANCC gene is considered lethal. However, recent studies have identified that, rarely, individuals who have mutations in both copies of  the FANCC gene may survive and have a condition called Fanconi Anemia - type C (FA-C). Collette does not have FA-C but is considered a carrier. This rare condition can happen if both parents have a mutation in one copy of the BRCA1 gene. If both parents do, then the chance to have a child with FA-C is 25%. If someone has a mutation in FANCC, their partner can be tested to determine if there is a chance their child could have FA-C. This testing is sometimes called carrier screening. Her relatives can pursue free genetic testing via [employer] [facility] option for 90 days (expires May 12, 2024).     FA can cause problems with development before birth leading to abnormalities of the bones, heart, kidneys, lungs, or digestive tract. After birth, people with FA can also have developmental delay, short stature, and abnormal skin coloring. FA also causes a high risk for cancer.      Pre-Implantation Genetic Testing  Some people who have a mutation in FANCC want to reduce the chance of passing on that mutation to a child. If an individual uses in-vitro fertilization to get pregnant, genetic testing can be conducted on embryos to determine if they have the familial mutation(s). Embryos that do not can then be selected for use, reducing the chance of having a child who is affected. To get more information about this process, individuals with a FANCC mutation can speak with a reproductive genetic counselor.     Assessment/Plan  Continue treatment for colon cancer/metastasis diagnosis.  Continue routine cancer screening (i.e. mammogram, Pap smear, colonoscopy, skin exams, etc.) as recommended.  Share genetic test results with relatives.     Questions were encouraged and answered to the patient's satisfaction, and she verbalized understanding of information and agreement with the plan.         Signed,    Rm Mandel MS, [facility]  Certified Genetic Counselor, Hereditary and High-Risk Clinic  Hematology/Oncology, [facility]    05/02/2024

## 2024-05-03 RX ORDER — METRONIDAZOLE 500 MG/1
500 TABLET ORAL 2 TIMES DAILY
Qty: 2 TABLET | Refills: 0 | Status: ON HOLD | OUTPATIENT
Start: 2024-05-03 | End: 2024-05-13 | Stop reason: HOSPADM

## 2024-05-03 RX ORDER — CIPROFLOXACIN 500 MG/1
500 TABLET ORAL 2 TIMES DAILY
Qty: 2 TABLET | Refills: 0 | Status: ON HOLD | OUTPATIENT
Start: 2024-05-03 | End: 2024-05-13 | Stop reason: HOSPADM

## 2024-05-03 RX ORDER — POLYETHYLENE GLYCOL 3350, SODIUM SULFATE ANHYDROUS, SODIUM BICARBONATE, SODIUM CHLORIDE, POTASSIUM CHLORIDE 236; 22.74; 6.74; 5.86; 2.97 G/4L; G/4L; G/4L; G/4L; G/4L
4 POWDER, FOR SOLUTION ORAL ONCE
Qty: 1 ML | Refills: 0 | Status: SHIPPED | OUTPATIENT
Start: 2024-05-03 | End: 2024-05-03

## 2024-05-06 ENCOUNTER — PATIENT MESSAGE (OUTPATIENT)
Dept: SURGERY | Facility: CLINIC | Age: 49
End: 2024-05-06
Payer: COMMERCIAL

## 2024-05-06 RX ORDER — METRONIDAZOLE 500 MG/1
500 TABLET ORAL 2 TIMES DAILY
Qty: 2 TABLET | Refills: 0 | Status: ON HOLD | OUTPATIENT
Start: 2024-05-06 | End: 2024-05-13 | Stop reason: HOSPADM

## 2024-05-06 RX ORDER — CIPROFLOXACIN 500 MG/1
500 TABLET ORAL 2 TIMES DAILY
Qty: 2 TABLET | Refills: 0 | Status: ON HOLD | OUTPATIENT
Start: 2024-05-06 | End: 2024-05-13 | Stop reason: HOSPADM

## 2024-05-07 ENCOUNTER — OFFICE VISIT (OUTPATIENT)
Dept: INTERVENTIONAL RADIOLOGY/VASCULAR | Facility: CLINIC | Age: 49
DRG: 330 | End: 2024-05-07
Payer: COMMERCIAL

## 2024-05-07 VITALS
HEART RATE: 90 BPM | WEIGHT: 157.88 LBS | HEIGHT: 70 IN | DIASTOLIC BLOOD PRESSURE: 78 MMHG | BODY MASS INDEX: 22.6 KG/M2 | SYSTOLIC BLOOD PRESSURE: 111 MMHG

## 2024-05-07 DIAGNOSIS — C18.7 MALIGNANT NEOPLASM OF SIGMOID COLON: ICD-10-CM

## 2024-05-07 DIAGNOSIS — C78.7 METASTASIS TO LIVER: Primary | ICD-10-CM

## 2024-05-07 PROCEDURE — 99999 PR PBB SHADOW E&M-EST. PATIENT-LVL IV: CPT | Mod: PBBFAC,,, | Performed by: FAMILY MEDICINE

## 2024-05-07 PROCEDURE — 99204 OFFICE O/P NEW MOD 45 MIN: CPT | Mod: S$GLB,,, | Performed by: FAMILY MEDICINE

## 2024-05-07 NOTE — PROGRESS NOTES
"Subjective     Patient ID: Collette Castro is a 49 y.o. female.    Chief Complaint: Lesion    Patient referred to Interventional Radiology by Kimo Yuan MD for evaluation of a liver lesion. 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 She has undergone a few cycles of chemotherapy. MRI obtained on 4/10/2024 noted "significant reduction/resolution of multiple hepatic metastatic lesions with only one discernible lesion on today's examination. This measures up to 0.7 cm with in the right hepatic lobe, previously 1.4 cm." Patient reports feeling well. She denies any abdominal pain or abdominal distention. She is accompanied by her .      Review of Systems   Constitutional:  Negative for activity change, appetite change, chills, fatigue and fever.   Respiratory:  Negative for cough, shortness of breath, wheezing and stridor.    Cardiovascular:  Negative for chest pain, palpitations and leg swelling.   Gastrointestinal:  Negative for abdominal distention, abdominal pain, constipation, diarrhea, nausea and vomiting.          Objective     Physical Exam  Constitutional:       General: She is not in acute distress.     Appearance: She is well-developed. She is not diaphoretic.   HENT:      Head: Normocephalic and atraumatic.   Pulmonary:      Effort: Pulmonary effort is normal. No respiratory distress.   Neurological:      Mental Status: She is alert and oriented to person, place, and time.   Psychiatric:         Behavior: Behavior normal.         Thought Content: Thought content normal.         Judgment: Judgment normal.       Reviewed surgical oncology progress note    MRI 4/10/2024       Assessment and Plan     1. Metastasis to liver  -     IR RF Ablation Liver Tumors; Future; Expected date: 05/07/2024  -     MRI Abdomen W WO Contrast; Future; Expected date: 06/07/2024  -     Comprehensive Metabolic Panel; Future; Expected date: 05/07/2024  -     Protime-INR; Future; Expected date: " 05/07/2024  -     CBC Auto Differential; Future; Expected date: 05/07/2024    2. Malignant neoplasm of sigmoid colon  -     IR RF Ablation Liver Tumors; Future; Expected date: 05/07/2024  -     MRI Abdomen W WO Contrast; Future; Expected date: 06/07/2024  -     Comprehensive Metabolic Panel; Future; Expected date: 05/07/2024  -     Protime-INR; Future; Expected date: 05/07/2024  -     CBC Auto Differential; Future; Expected date: 05/07/2024        Discussed case with Dr. Larios. Explained to patient can offer treatment of liver lesion with microwave ablation. Discussed how the procedure will be performed, risks (including, but not limited to, pain, bleeding, infection, damage to nearby structures, and the need for additional procedures), benefits, possible complications, pre-post procedure expectations, and alternatives. The patient voices understanding and all questions have been answered.  The patient agrees to proceed as planned. Patient scheduled for 5/30/2024. Pre-procedure handout with clinic phone number provided. Will obtain f/u MRI approximately 1 month post ablation.

## 2024-05-07 NOTE — Clinical Note
"Thank you for referring Ms. Castro to Interventional Radiology at the [facility]. Please don't hesitate to contact us if there are any questions regarding this evaluation at [phone]. If you have any other patients for whom you would like a consultation, please place an order for "[id number]", and we will be happy to review their case.  Sincerely, RODRIGO Ovalle, FNP Interventional Radiology   "

## 2024-05-07 NOTE — LETTER
May 7, 2024    Collette Castro  [address] [place] [address]     [facility] [address] [address]  [address]  [facility] [address]  Phone: [phone] PRE-PROCEDURE INSTRUCTIONS    Your procedure with Interventional Radiology is scheduled for _______________________.     Please arrive by _______________________. Please note your appointment time in [facility] will be different.    You must check-in and receive a wristband before going to your procedure. We will call you the day before to let you know your check-in location.    **Do not eat or drink anything between midnight and the time of your procedure. This includes gum, mints, and candy lemon drops.    **Do not smoke or drink alcoholic beverages 24 hours prior to your procedure.    **If you wear contact lenses, dentures, hearing aids, or glasses, bring a container to put them in during the procedure and give them to a family member for safekeeping.    **If you have been diagnosed with sleep apnea please bring your CPAP machine.    **If your doctor has scheduled you for an overnight stay, bring a small overnight bag with any personal items that you may need.    **Make arrangements in advance for transportation home by a responsible adult. It is not safe to drive a vehicle during the 24 hours following the procedure.    **All [facility] facilities and properties are tobacco free. Smoking is NOT allowed.    PLEASE NOTE: The procedure schedule has many variables which affect the time of your procedure. Family members should be available if your surgery time changes.    If you have any questions about these instructions call Interventional Radiology at [phone] Monday - Friday between 8:00am and 4:00pm or [phone] (ask for interventional radiology physician) for after hours.

## 2024-05-07 NOTE — PROGRESS NOTES
Patient seen in IR clinic today.  Scheduled for upcoming IR procedure.  Pre-procedure instructions were reviewed with patient.  Patient instructed not to eat or drink anything after midnight the night before procedure.  Pt aware will need someone to provide transport home and monitor pt 8 hours post procedure.  No driving for at least 24 hours after procedure.     Pt verbalized understanding of all pre-procedure instructions.  Written instructions given at appointment today/sent to patient in Mychart/portal.

## 2024-05-07 NOTE — PRE-PROCEDURE INSTRUCTIONS
PreOp Instructions given:   - Verbal medication information (what to hold and what to take)   - NPO guidelines and bowel prep instructions given/CRS  - Arrival place directions given; time to be given the day before procedure by the   Surgeon's Office DOSC  - Bathing with antibacterial soap   - Don't wear any jewelry or bring any valuables AM of surgery   - No makeup or moisturizer to face   - No perfume/cologne, powder, lotions or aftershave   Pt. verbalized understanding.   Pt denies any h/o Anesthesia/Sedation complications or side effects.  Patient does not know arrival time.  Explained that this information comes from the surgeon's office and if they haven't heard from them by 2 or 3 pm to call the office.  Patient stated an understanding.

## 2024-05-08 ENCOUNTER — TELEPHONE (OUTPATIENT)
Dept: SURGERY | Facility: CLINIC | Age: 49
End: 2024-05-08
Payer: COMMERCIAL

## 2024-05-08 ENCOUNTER — ANESTHESIA EVENT (OUTPATIENT)
Dept: SURGERY | Facility: HOSPITAL | Age: 49
DRG: 330 | End: 2024-05-08
Payer: COMMERCIAL

## 2024-05-09 ENCOUNTER — ANESTHESIA (OUTPATIENT)
Dept: SURGERY | Facility: HOSPITAL | Age: 49
DRG: 330 | End: 2024-05-09
Payer: COMMERCIAL

## 2024-05-09 ENCOUNTER — HOSPITAL ENCOUNTER (INPATIENT)
Facility: HOSPITAL | Age: 49
LOS: 4 days | Discharge: HOME | DRG: 330 | End: 2024-05-13
Attending: COLON & RECTAL SURGERY | Admitting: COLON & RECTAL SURGERY
Payer: COMMERCIAL

## 2024-05-09 DIAGNOSIS — C18.9 COLON CANCER: ICD-10-CM

## 2024-05-09 LAB
ABO + RH BLD: NORMAL
BLD GP AB SCN CELLS X3 SERPL QL: NORMAL
SPECIMEN OUTDATE: NORMAL

## 2024-05-09 PROCEDURE — 0DBP0ZZ EXCISION OF RECTUM, OPEN APPROACH: ICD-10-PCS | Performed by: COLON & RECTAL SURGERY

## 2024-05-09 PROCEDURE — 25000003 PHARM REV CODE 250: Performed by: NURSE PRACTITIONER

## 2024-05-09 PROCEDURE — 63600175 PHARM REV CODE 636 W HCPCS: Performed by: STUDENT IN AN ORGANIZED HEALTH CARE EDUCATION/TRAINING PROGRAM

## 2024-05-09 PROCEDURE — D9220A PRA ANESTHESIA: Mod: ANES,,, | Performed by: ANESTHESIOLOGY

## 2024-05-09 PROCEDURE — 36000712 HC OR TIME LEV V 1ST 15 MIN: Performed by: COLON & RECTAL SURGERY

## 2024-05-09 PROCEDURE — 36415 COLL VENOUS BLD VENIPUNCTURE: CPT | Performed by: COLON & RECTAL SURGERY

## 2024-05-09 PROCEDURE — 0D1B0Z4 BYPASS ILEUM TO CUTANEOUS, OPEN APPROACH: ICD-10-PCS | Performed by: COLON & RECTAL SURGERY

## 2024-05-09 PROCEDURE — C1769 GUIDE WIRE: HCPCS | Performed by: COLON & RECTAL SURGERY

## 2024-05-09 PROCEDURE — 25000003 PHARM REV CODE 250: Performed by: STUDENT IN AN ORGANIZED HEALTH CARE EDUCATION/TRAINING PROGRAM

## 2024-05-09 PROCEDURE — 44139 MOBILIZATION OF COLON: CPT | Mod: ,,, | Performed by: COLON & RECTAL SURGERY

## 2024-05-09 PROCEDURE — 44146 PARTIAL REMOVAL OF COLON: CPT | Mod: ,,, | Performed by: COLON & RECTAL SURGERY

## 2024-05-09 PROCEDURE — 63600175 PHARM REV CODE 636 W HCPCS: Performed by: NURSE PRACTITIONER

## 2024-05-09 PROCEDURE — 0DNW4ZZ RELEASE PERITONEUM, PERCUTANEOUS ENDOSCOPIC APPROACH: ICD-10-PCS | Performed by: COLON & RECTAL SURGERY

## 2024-05-09 PROCEDURE — 88305 TISSUE EXAM BY PATHOLOGIST: CPT | Mod: 26,,, | Performed by: PATHOLOGY

## 2024-05-09 PROCEDURE — 71000015 HC POSTOP RECOV 1ST HR: Performed by: COLON & RECTAL SURGERY

## 2024-05-09 PROCEDURE — 0T788DZ DILATION OF BILATERAL URETERS WITH INTRALUMINAL DEVICE, VIA NATURAL OR ARTIFICIAL OPENING ENDOSCOPIC: ICD-10-PCS | Performed by: UROLOGY

## 2024-05-09 PROCEDURE — 63600175 PHARM REV CODE 636 W HCPCS: Mod: JZ,JG | Performed by: COLON & RECTAL SURGERY

## 2024-05-09 PROCEDURE — 88309 TISSUE EXAM BY PATHOLOGIST: CPT | Mod: 26,,, | Performed by: PATHOLOGY

## 2024-05-09 PROCEDURE — 25000003 PHARM REV CODE 250: Performed by: NURSE ANESTHETIST, CERTIFIED REGISTERED

## 2024-05-09 PROCEDURE — 71000033 HC RECOVERY, INTIAL HOUR: Performed by: COLON & RECTAL SURGERY

## 2024-05-09 PROCEDURE — 52005 CYSTO W/URTRL CATHJ: CPT | Mod: ,,, | Performed by: UROLOGY

## 2024-05-09 PROCEDURE — C1765 ADHESION BARRIER: HCPCS | Performed by: COLON & RECTAL SURGERY

## 2024-05-09 PROCEDURE — 63600175 PHARM REV CODE 636 W HCPCS: Performed by: NURSE ANESTHETIST, CERTIFIED REGISTERED

## 2024-05-09 PROCEDURE — 27201423 OPTIME MED/SURG SUP & DEVICES STERILE SUPPLY: Performed by: COLON & RECTAL SURGERY

## 2024-05-09 PROCEDURE — 20600001 HC STEP DOWN PRIVATE ROOM

## 2024-05-09 PROCEDURE — 0DJD8ZZ INSPECTION OF LOWER INTESTINAL TRACT, VIA NATURAL OR ARTIFICIAL OPENING ENDOSCOPIC: ICD-10-PCS | Performed by: COLON & RECTAL SURGERY

## 2024-05-09 PROCEDURE — 25000003 PHARM REV CODE 250

## 2024-05-09 PROCEDURE — 88309 TISSUE EXAM BY PATHOLOGIST: CPT | Performed by: PATHOLOGY

## 2024-05-09 PROCEDURE — 36000713 HC OR TIME LEV V EA ADD 15 MIN: Performed by: COLON & RECTAL SURGERY

## 2024-05-09 PROCEDURE — D9220A PRA ANESTHESIA: Mod: CRNA,,, | Performed by: NURSE ANESTHETIST, CERTIFIED REGISTERED

## 2024-05-09 PROCEDURE — 37000008 HC ANESTHESIA 1ST 15 MINUTES: Performed by: COLON & RECTAL SURGERY

## 2024-05-09 PROCEDURE — 88305 TISSUE EXAM BY PATHOLOGIST: CPT | Mod: 59 | Performed by: PATHOLOGY

## 2024-05-09 PROCEDURE — C1758 CATHETER, URETERAL: HCPCS | Performed by: COLON & RECTAL SURGERY

## 2024-05-09 PROCEDURE — 37000009 HC ANESTHESIA EA ADD 15 MINS: Performed by: COLON & RECTAL SURGERY

## 2024-05-09 PROCEDURE — 8E0W4CZ ROBOTIC ASSISTED PROCEDURE OF TRUNK REGION, PERCUTANEOUS ENDOSCOPIC APPROACH: ICD-10-PCS | Performed by: COLON & RECTAL SURGERY

## 2024-05-09 PROCEDURE — 63600175 PHARM REV CODE 636 W HCPCS: Mod: JG | Performed by: NURSE ANESTHETIST, CERTIFIED REGISTERED

## 2024-05-09 PROCEDURE — 0DTN4ZG RESECTION OF SIGMOID COLON, PERCUTANEOUS ENDOSCOPIC APPROACH, HAND-ASSISTED: ICD-10-PCS | Performed by: COLON & RECTAL SURGERY

## 2024-05-09 PROCEDURE — 86850 RBC ANTIBODY SCREEN: CPT | Performed by: NURSE PRACTITIONER

## 2024-05-09 PROCEDURE — 88307 TISSUE EXAM BY PATHOLOGIST: CPT | Performed by: PATHOLOGY

## 2024-05-09 DEVICE — BARRIER SEPRAFILM ADHESION: Type: IMPLANTABLE DEVICE | Site: ABDOMEN | Status: FUNCTIONAL

## 2024-05-09 RX ORDER — GABAPENTIN 300 MG/1
300 CAPSULE ORAL 3 TIMES DAILY
Status: DISPENSED | OUTPATIENT
Start: 2024-05-09

## 2024-05-09 RX ORDER — HEPARIN SODIUM 5000 [USP'U]/ML
5000 INJECTION, SOLUTION INTRAVENOUS; SUBCUTANEOUS EVERY 8 HOURS
Status: COMPLETED | OUTPATIENT
Start: 2024-05-09 | End: 2024-05-09

## 2024-05-09 RX ORDER — KETOROLAC TROMETHAMINE 15 MG/ML
15 INJECTION, SOLUTION INTRAMUSCULAR; INTRAVENOUS EVERY 6 HOURS
Status: DISCONTINUED | OUTPATIENT
Start: 2024-05-09 | End: 2024-05-10

## 2024-05-09 RX ORDER — FENTANYL CITRATE 50 UG/ML
25 INJECTION, SOLUTION INTRAMUSCULAR; INTRAVENOUS EVERY 5 MIN PRN
Status: DISCONTINUED | OUTPATIENT
Start: 2024-05-09 | End: 2024-05-09 | Stop reason: HOSPADM

## 2024-05-09 RX ORDER — SODIUM CHLORIDE 9 MG/ML
INJECTION, SOLUTION INTRAVENOUS CONTINUOUS
Status: DISCONTINUED | OUTPATIENT
Start: 2024-05-09 | End: 2024-05-09

## 2024-05-09 RX ORDER — MUPIROCIN 20 MG/G
1 OINTMENT TOPICAL
Status: COMPLETED | OUTPATIENT
Start: 2024-05-09 | End: 2024-05-09

## 2024-05-09 RX ORDER — GABAPENTIN 300 MG/1
300 CAPSULE ORAL 3 TIMES DAILY
Status: DISCONTINUED | OUTPATIENT
Start: 2024-05-09 | End: 2024-05-09

## 2024-05-09 RX ORDER — ONDANSETRON HYDROCHLORIDE 2 MG/ML
INJECTION, SOLUTION INTRAVENOUS
Status: DISCONTINUED | OUTPATIENT
Start: 2024-05-09 | End: 2024-05-09

## 2024-05-09 RX ORDER — FENTANYL CITRATE 50 UG/ML
INJECTION, SOLUTION INTRAMUSCULAR; INTRAVENOUS
Status: DISCONTINUED | OUTPATIENT
Start: 2024-05-09 | End: 2024-05-09

## 2024-05-09 RX ORDER — BUPIVACAINE HYDROCHLORIDE 2.5 MG/ML
INJECTION, SOLUTION EPIDURAL; INFILTRATION; INTRACAUDAL
Status: DISCONTINUED | OUTPATIENT
Start: 2024-05-09 | End: 2024-05-09 | Stop reason: HOSPADM

## 2024-05-09 RX ORDER — ACETAMINOPHEN 10 MG/ML
1000 INJECTION, SOLUTION INTRAVENOUS EVERY 8 HOURS
Status: COMPLETED | OUTPATIENT
Start: 2024-05-09 | End: 2024-05-10

## 2024-05-09 RX ORDER — ACETAMINOPHEN 10 MG/ML
INJECTION, SOLUTION INTRAVENOUS
Status: DISCONTINUED | OUTPATIENT
Start: 2024-05-09 | End: 2024-05-09

## 2024-05-09 RX ORDER — GABAPENTIN 300 MG/1
300 CAPSULE ORAL
Status: COMPLETED | OUTPATIENT
Start: 2024-05-09 | End: 2024-05-09

## 2024-05-09 RX ORDER — SODIUM CHLORIDE 9 MG/ML
INJECTION, SOLUTION INTRAVENOUS
Status: DISCONTINUED | OUTPATIENT
Start: 2024-05-09 | End: 2024-05-09

## 2024-05-09 RX ORDER — SODIUM CHLORIDE 9 MG/ML
INJECTION, SOLUTION INTRAVENOUS CONTINUOUS
Status: DISCONTINUED | OUTPATIENT
Start: 2024-05-09 | End: 2024-05-10

## 2024-05-09 RX ORDER — METRONIDAZOLE 500 MG/100ML
INJECTION, SOLUTION INTRAVENOUS
Status: DISCONTINUED | OUTPATIENT
Start: 2024-05-09 | End: 2024-05-09

## 2024-05-09 RX ORDER — INDOCYANINE GREEN AND WATER 25 MG
KIT INJECTION
Status: DISCONTINUED | OUTPATIENT
Start: 2024-05-09 | End: 2024-05-09

## 2024-05-09 RX ORDER — HALOPERIDOL 5 MG/ML
0.5 INJECTION INTRAMUSCULAR EVERY 10 MIN PRN
Status: DISCONTINUED | OUTPATIENT
Start: 2024-05-09 | End: 2024-05-09 | Stop reason: HOSPADM

## 2024-05-09 RX ORDER — LIDOCAINE HYDROCHLORIDE 10 MG/ML
1 INJECTION, SOLUTION EPIDURAL; INFILTRATION; INTRACAUDAL; PERINEURAL
Status: DISCONTINUED | OUTPATIENT
Start: 2024-05-09 | End: 2024-05-09

## 2024-05-09 RX ORDER — TRIPROLIDINE/PSEUDOEPHEDRINE 2.5MG-60MG
600 TABLET ORAL
Status: COMPLETED | OUTPATIENT
Start: 2024-05-09 | End: 2024-05-09

## 2024-05-09 RX ORDER — ACETAMINOPHEN 500 MG
1000 TABLET ORAL EVERY 8 HOURS
Status: DISCONTINUED | OUTPATIENT
Start: 2024-05-10 | End: 2024-05-13 | Stop reason: HOSPADM

## 2024-05-09 RX ORDER — MUPIROCIN 20 MG/G
OINTMENT TOPICAL 2 TIMES DAILY
Status: COMPLETED | OUTPATIENT
Start: 2024-05-09 | End: 2024-05-11

## 2024-05-09 RX ORDER — LIDOCAINE HYDROCHLORIDE 20 MG/ML
INJECTION INTRAVENOUS
Status: DISCONTINUED | OUTPATIENT
Start: 2024-05-09 | End: 2024-05-09

## 2024-05-09 RX ORDER — DEXMEDETOMIDINE HYDROCHLORIDE 100 UG/ML
INJECTION, SOLUTION INTRAVENOUS
Status: DISCONTINUED | OUTPATIENT
Start: 2024-05-09 | End: 2024-05-09

## 2024-05-09 RX ORDER — ONDANSETRON HYDROCHLORIDE 2 MG/ML
8 INJECTION, SOLUTION INTRAVENOUS ONCE
Status: COMPLETED | OUTPATIENT
Start: 2024-05-09 | End: 2024-05-09

## 2024-05-09 RX ORDER — VASOPRESSIN 20 [USP'U]/ML
INJECTION, SOLUTION INTRAMUSCULAR; SUBCUTANEOUS
Status: DISCONTINUED | OUTPATIENT
Start: 2024-05-09 | End: 2024-05-09

## 2024-05-09 RX ORDER — ALVIMOPAN 12 MG/1
12 CAPSULE ORAL
Status: COMPLETED | OUTPATIENT
Start: 2024-05-09 | End: 2024-05-09

## 2024-05-09 RX ORDER — PROPOFOL 10 MG/ML
VIAL (ML) INTRAVENOUS
Status: DISCONTINUED | OUTPATIENT
Start: 2024-05-09 | End: 2024-05-09

## 2024-05-09 RX ORDER — PHENYLEPHRINE HYDROCHLORIDE 10 MG/ML
INJECTION INTRAVENOUS
Status: DISCONTINUED | OUTPATIENT
Start: 2024-05-09 | End: 2024-05-09

## 2024-05-09 RX ORDER — ROCURONIUM BROMIDE 10 MG/ML
INJECTION, SOLUTION INTRAVENOUS
Status: DISCONTINUED | OUTPATIENT
Start: 2024-05-09 | End: 2024-05-09

## 2024-05-09 RX ORDER — MIDAZOLAM HYDROCHLORIDE 1 MG/ML
INJECTION INTRAMUSCULAR; INTRAVENOUS
Status: DISCONTINUED | OUTPATIENT
Start: 2024-05-09 | End: 2024-05-09

## 2024-05-09 RX ORDER — SODIUM CHLORIDE 0.9 % (FLUSH) 0.9 %
10 SYRINGE (ML) INJECTION
Status: DISCONTINUED | OUTPATIENT
Start: 2024-05-09 | End: 2024-05-13 | Stop reason: HOSPADM

## 2024-05-09 RX ORDER — KETAMINE HCL IN 0.9 % NACL 50 MG/5 ML
SYRINGE (ML) INTRAVENOUS
Status: DISCONTINUED | OUTPATIENT
Start: 2024-05-09 | End: 2024-05-09

## 2024-05-09 RX ORDER — ENOXAPARIN SODIUM 100 MG/ML
40 INJECTION SUBCUTANEOUS EVERY 24 HOURS
Status: DISCONTINUED | OUTPATIENT
Start: 2024-05-10 | End: 2024-05-10

## 2024-05-09 RX ORDER — METRONIDAZOLE 500 MG/100ML
500 INJECTION, SOLUTION INTRAVENOUS
Status: DISCONTINUED | OUTPATIENT
Start: 2024-05-09 | End: 2024-05-09

## 2024-05-09 RX ORDER — KETOROLAC TROMETHAMINE 30 MG/ML
INJECTION, SOLUTION INTRAMUSCULAR; INTRAVENOUS
Status: DISCONTINUED | OUTPATIENT
Start: 2024-05-09 | End: 2024-05-09

## 2024-05-09 RX ORDER — ONDANSETRON 8 MG/1
8 TABLET, ORALLY DISINTEGRATING ORAL EVERY 8 HOURS PRN
Status: DISCONTINUED | OUTPATIENT
Start: 2024-05-09 | End: 2024-05-13 | Stop reason: HOSPADM

## 2024-05-09 RX ORDER — LIDOCAINE HYDROCHLORIDE ANHYDROUS AND DEXTROSE MONOHYDRATE .8; 5 G/100ML; G/100ML
INJECTION, SOLUTION INTRAVENOUS CONTINUOUS PRN
Status: DISCONTINUED | OUTPATIENT
Start: 2024-05-09 | End: 2024-05-09

## 2024-05-09 RX ORDER — HYDROMORPHONE HYDROCHLORIDE 1 MG/ML
1 INJECTION, SOLUTION INTRAMUSCULAR; INTRAVENOUS; SUBCUTANEOUS EVERY 4 HOURS PRN
Status: DISCONTINUED | OUTPATIENT
Start: 2024-05-09 | End: 2024-05-13 | Stop reason: HOSPADM

## 2024-05-09 RX ORDER — DEXAMETHASONE SODIUM PHOSPHATE 4 MG/ML
INJECTION, SOLUTION INTRA-ARTICULAR; INTRALESIONAL; INTRAMUSCULAR; INTRAVENOUS; SOFT TISSUE
Status: DISCONTINUED | OUTPATIENT
Start: 2024-05-09 | End: 2024-05-09

## 2024-05-09 RX ADMIN — DEXAMETHASONE SODIUM PHOSPHATE 8 MG: 4 INJECTION, SOLUTION INTRAMUSCULAR; INTRAVENOUS at 07:05

## 2024-05-09 RX ADMIN — HEPARIN SODIUM 5000 UNITS: 5000 INJECTION INTRAVENOUS; SUBCUTANEOUS at 06:05

## 2024-05-09 RX ADMIN — KETOROLAC TROMETHAMINE 30 MG: 30 INJECTION, SOLUTION INTRAMUSCULAR; INTRAVENOUS at 02:05

## 2024-05-09 RX ADMIN — FENTANYL CITRATE 50 MCG: 50 INJECTION, SOLUTION INTRAMUSCULAR; INTRAVENOUS at 02:05

## 2024-05-09 RX ADMIN — MUPIROCIN: 20 OINTMENT TOPICAL at 09:05

## 2024-05-09 RX ADMIN — SODIUM CHLORIDE: 0.9 INJECTION, SOLUTION INTRAVENOUS at 11:05

## 2024-05-09 RX ADMIN — ACETAMINOPHEN 1000 MG: 10 INJECTION, SOLUTION INTRAVENOUS at 09:05

## 2024-05-09 RX ADMIN — KETOROLAC TROMETHAMINE 15 MG: 15 INJECTION, SOLUTION INTRAMUSCULAR; INTRAVENOUS at 11:05

## 2024-05-09 RX ADMIN — PIPERACILLIN SODIUM AND TAZOBACTAM SODIUM 4.5 G: 4; .5 INJECTION, POWDER, FOR SOLUTION INTRAVENOUS at 05:05

## 2024-05-09 RX ADMIN — Medication 20 MG: at 07:05

## 2024-05-09 RX ADMIN — SODIUM CHLORIDE: 0.9 INJECTION, SOLUTION INTRAVENOUS at 07:05

## 2024-05-09 RX ADMIN — ONDANSETRON 8 MG: 2 INJECTION INTRAMUSCULAR; INTRAVENOUS at 05:05

## 2024-05-09 RX ADMIN — PHENYLEPHRINE HYDROCHLORIDE 100 MCG: 10 INJECTION INTRAVENOUS at 11:05

## 2024-05-09 RX ADMIN — METRONIDAZOLE 500 MG: 500 INJECTION, SOLUTION INTRAVENOUS at 08:05

## 2024-05-09 RX ADMIN — GABAPENTIN 300 MG: 300 CAPSULE ORAL at 09:05

## 2024-05-09 RX ADMIN — ROCURONIUM BROMIDE 10 MG: 10 INJECTION, SOLUTION INTRAVENOUS at 01:05

## 2024-05-09 RX ADMIN — Medication 20 MG: at 09:05

## 2024-05-09 RX ADMIN — PHENYLEPHRINE HYDROCHLORIDE 0.2 MCG/KG/MIN: 10 INJECTION INTRAVENOUS at 12:05

## 2024-05-09 RX ADMIN — MUPIROCIN 1 G: 20 OINTMENT TOPICAL at 06:05

## 2024-05-09 RX ADMIN — MIDAZOLAM HYDROCHLORIDE 2 MG: 2 INJECTION, SOLUTION INTRAMUSCULAR; INTRAVENOUS at 07:05

## 2024-05-09 RX ADMIN — PROPOFOL 150 MG: 10 INJECTION, EMULSION INTRAVENOUS at 07:05

## 2024-05-09 RX ADMIN — Medication 10 MG: at 11:05

## 2024-05-09 RX ADMIN — DEXMEDETOMIDINE 8 MCG: 100 INJECTION, SOLUTION, CONCENTRATE INTRAVENOUS at 12:05

## 2024-05-09 RX ADMIN — LIDOCAINE HYDROCHLORIDE ANHYDROUS AND DEXTROSE MONOHYDRATE 0.03 MG/KG/MIN: .8; 5 INJECTION, SOLUTION INTRAVENOUS at 08:05

## 2024-05-09 RX ADMIN — SODIUM CHLORIDE, SODIUM GLUCONATE, SODIUM ACETATE, POTASSIUM CHLORIDE, MAGNESIUM CHLORIDE, SODIUM PHOSPHATE, DIBASIC, AND POTASSIUM PHOSPHATE: .53; .5; .37; .037; .03; .012; .00082 INJECTION, SOLUTION INTRAVENOUS at 01:05

## 2024-05-09 RX ADMIN — FENTANYL CITRATE 100 MCG: 50 INJECTION, SOLUTION INTRAMUSCULAR; INTRAVENOUS at 07:05

## 2024-05-09 RX ADMIN — FENTANYL CITRATE 50 MCG: 50 INJECTION, SOLUTION INTRAMUSCULAR; INTRAVENOUS at 01:05

## 2024-05-09 RX ADMIN — CEFTRIAXONE SODIUM 2 G: 2 INJECTION, POWDER, FOR SOLUTION INTRAMUSCULAR; INTRAVENOUS at 07:05

## 2024-05-09 RX ADMIN — KETOROLAC TROMETHAMINE 15 MG: 15 INJECTION, SOLUTION INTRAMUSCULAR; INTRAVENOUS at 05:05

## 2024-05-09 RX ADMIN — PHENYLEPHRINE HYDROCHLORIDE 100 MCG: 10 INJECTION INTRAVENOUS at 12:05

## 2024-05-09 RX ADMIN — SUGAMMADEX 200 MG: 100 INJECTION, SOLUTION INTRAVENOUS at 02:05

## 2024-05-09 RX ADMIN — ONDANSETRON 4 MG: 2 INJECTION INTRAMUSCULAR; INTRAVENOUS at 07:05

## 2024-05-09 RX ADMIN — ROCURONIUM BROMIDE 10 MG: 10 INJECTION, SOLUTION INTRAVENOUS at 11:05

## 2024-05-09 RX ADMIN — IBUPROFEN 600 MG: 100 SUSPENSION ORAL at 06:05

## 2024-05-09 RX ADMIN — SODIUM CHLORIDE: 9 INJECTION, SOLUTION INTRAVENOUS at 03:05

## 2024-05-09 RX ADMIN — ROCURONIUM BROMIDE 50 MG: 10 INJECTION, SOLUTION INTRAVENOUS at 07:05

## 2024-05-09 RX ADMIN — LIDOCAINE HYDROCHLORIDE 100 MG: 20 INJECTION INTRAVENOUS at 07:05

## 2024-05-09 RX ADMIN — VASOPRESSIN 2 UNITS: 20 INJECTION INTRAVENOUS at 03:05

## 2024-05-09 RX ADMIN — ALVIMOPAN 12 MG: 12 CAPSULE ORAL at 06:05

## 2024-05-09 RX ADMIN — SODIUM CHLORIDE, SODIUM GLUCONATE, SODIUM ACETATE, POTASSIUM CHLORIDE, MAGNESIUM CHLORIDE, SODIUM PHOSPHATE, DIBASIC, AND POTASSIUM PHOSPHATE: .53; .5; .37; .037; .03; .012; .00082 INJECTION, SOLUTION INTRAVENOUS at 07:05

## 2024-05-09 RX ADMIN — ACETAMINOPHEN 1000 MG: 10 INJECTION, SOLUTION INTRAVENOUS at 08:05

## 2024-05-09 RX ADMIN — GABAPENTIN 300 MG: 300 CAPSULE ORAL at 06:05

## 2024-05-09 RX ADMIN — INDOCYANINE GREEN AND WATER 7.5 MG: KIT at 11:05

## 2024-05-09 RX ADMIN — GABAPENTIN 300 MG: 300 CAPSULE ORAL at 04:05

## 2024-05-09 NOTE — PROGRESS NOTES
.Nursing Transfer Note      Reason patient is being transferred: procedure care complete     Transfer To: 1046    Transfer via bed    Transfer with cardiac monitoring    Transported by PCTs    Medicines sent: N/a    Any special needs or follow-up needed: routine    Chart send with patient: Yes    Notified: spouse, daughter    Patient reassessed at: 5/9/24 4977(date, time)

## 2024-05-09 NOTE — TRANSFER OF CARE
"Anesthesia Transfer of Care Note    Patient: Collette Castro    Procedure(s) Performed: Procedure(s) (LRB):  XI ROBOTIC COLECTOMY, SIGMOID (N/A)  CYSTOSCOPY, WITH URETERAL STENT INSERTION (Bilateral)  SIGMOIDOSCOPY, FLEXIBLE (N/A)  MOBILIZATION, SPLENIC FLEXURE, LAPAROSCOPIC  LYSIS, ADHESIONS  RESECTION, COLON, LOW ANTERIOR, robotic converted to open (N/A)    Patient location: PACU    Anesthesia Type: general    Transport from OR: Transported from OR on 6-10 L/min O2 by face mask with adequate spontaneous ventilation    Post pain: adequate analgesia    Post assessment: tolerated procedure well and no apparent anesthetic complications    Post vital signs: stable    Level of consciousness: lethargic    Nausea/Vomiting: no vomiting    Complications: none    Transfer of care protocol was followed      Last vitals: Visit Vitals  /69   Pulse 70   Temp 36.7 °C (98.1 °F)   Resp 16   Ht 5' 10" (1.778 m)   Wt 70.3 kg (155 lb)   SpO2 99%   Breastfeeding No   BMI 22.24 kg/m²     "

## 2024-05-09 NOTE — INTERVAL H&P NOTE
The patient has been examined and the H&P has been reviewed:    I concur with the findings and no changes have occurred since H&P was written.    Surgery risks, benefits and alternative options discussed and understood by patient/family.    Proceed with Robotic sigmoidectomy possible ileostomy  Consents signed at bedside.       There are no hospital problems to display for this patient.

## 2024-05-09 NOTE — OP NOTE
[facility] ([facility])  [facility]  Operative Note    Date: 05/09/2024    Pre-Op Diagnosis: malignant neoplasm of sigmoid colon  Patient Active Problem List    Diagnosis Date Noted    Metastasis to liver 04/25/2024    Personal history of irradiation 04/20/2024    History of cervical cancer 04/20/2024    Malignant neoplasm of sigmoid colon 01/31/2024    Ostomy nurse consultation 01/31/2024    Pneumonia due to COVID-19 virus 03/19/2021        Post-Op Diagnosis: Same     Procedure(s) Performed:   1.  Cystoscopy with bilateral ureteral catheter placement    Specimen(s): None    Staff Surgeon: Cas Bang MD    Assistant Surgeon: Kole Mehta MD     Anesthesia: General endotracheal anesthesia    Indications: Collette Castro is a 49 y.o. female with colon cancer.  Dr. Paris has requested intra-operative ureteral catheters to allow for early intra-operative identification and repair of any injuries.      Findings:  Bilateral 5Fr ureteral catheters placed without complication  Attached to 16 Fr Chamorro catheter with attachment device  Meatal stenosis requiring usage of 17Fr cystoscope     Estimated Blood Loss: min    Drains:   1.  bilateral 5 Fr ureteral catheters  2.  16 Fr chamorro catheter, 10 cc in the balloon    Procedure in Detail: Upon entering the room the patient was under general anesthesia.  The patient was then placed in the dorsal lithotomy position and prepped and draped in the usual sterile fashion. Preoperative antibiotics were administered per the primary surgeon preference.  Timeout was performed.      A 22 Fr cystoscope was used first to attempt to enter the urethra. However, the scope was unable to pass, significant meatal stenosis was noted. We then exchanged the 22Fr cystoscope for a 17Fr cystoscope which passed easily through the urethra. The right and left ureteral orifices were in the normal anatomic position.  There were no mucosal abnormalities.  Motion wire was then  inserted into the right UO and passed up to the level of the renal pelvis. This wire passed easily and no resistance was noted. A 5 Fr ureteral catheter was then passed over the wire up to the level of the renal pelvis. This also passed easily and no resistance was noted. The cystoscope was then removed leaving the ureteral catheter in place. A similar procedure was then performed on the right side.     A 16 Fr chamorro catheter was inserted and the balloon was filled with 10mL of sterile water. The ureteral catheters were secured to the Chamorro catheter in the standard fashion with the attachment device. There were no complications with the procedure and the patient tolerated our procedure well.     The case was then turned over to the primary surgeon.     Kole Mehta MD    I have reviewed the operative note performed by Dr. Mehta, and I concur with her/his documentation of Collette Castro. I was present for the critical or key portions of the procedure.

## 2024-05-09 NOTE — PLAN OF CARE
Problem: Adult Inpatient Plan of Care  Goal: Plan of Care Review  Outcome: Progressing  Goal: Patient-Specific Goal (Individualized)  Outcome: Progressing  Goal: Absence of Hospital-Acquired Illness or Injury  Outcome: Progressing  Goal: Optimal Comfort and Wellbeing  Outcome: Progressing  Goal: Readiness for Transition of Care  Outcome: Progressing     Problem: Infection  Goal: Absence of Infection Signs and Symptoms  Outcome: Progressing     Problem: Wound  Goal: Optimal Coping  Outcome: Progressing  Goal: Optimal Functional Ability  Outcome: Progressing  Goal: Absence of Infection Signs and Symptoms  Outcome: Progressing  Goal: Improved Oral Intake  Outcome: Progressing  Goal: Optimal Pain Control and Function  Outcome: Progressing  Goal: Skin Health and Integrity  Outcome: Progressing  Goal: Optimal Wound Healing  Outcome: Progressing     Problem: Fall Injury Risk  Goal: Absence of Fall and Fall-Related Injury  Outcome: Progressing

## 2024-05-09 NOTE — ANESTHESIA PREPROCEDURE EVALUATION
[facility]  Anesthesia Pre-Operative Evaluation        Patient Name: Collette Castro  YOB: 1975  MRN: [id number]    SUBJECTIVE:     Pre-operative Evaluation for Procedure(s) (LRB):  XI ROBOTIC COLECTOMY, SIGMOID (N/A)  INSERTION, CATHETER, URETER, BILATERAL (N/A)     05/09/2024    Collette Castro is a 49 y.o. female with a PMHx significant for stage IV sigmoid colon cancer, hx of cervical/uterine cancer.     The patient now presents for the above procedure(s).    Previous Airway: None documented.    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 Analyses with [employer]-Expanded (77 genes)       Review of patient's allergies indicates:   Allergen Reactions    Codeine Other (See Comments)     Per patient, causes pancreatitis    Hydrocodone bitartrate     Hydrocodone-acetaminophen Other (See Comments)    Opioids - morphine analogues      Chest pain    Oxycodone      Pancreatitis w/ all oral narctotics    Tramadol hcl      pancreatitis       Current Outpatient Medications   Medication Instructions    ALPRAZolam (XANAX) 0.5 mg, Oral, 2 times daily PRN    ciprofloxacin HCl (CIPRO) 500 mg, Oral, 2 times daily    ciprofloxacin HCl (CIPRO) 500 mg, Oral, 2 times daily    GAVILYTE-G 236-22.74-6.74 -5.86 gram suspension 4,000 mLs, Once    ibuprofen-diphenhydrAMINE cit (MOTRIN PM) 200-38 mg Tab Take by mouth.    metFORMIN (GLUCOPHAGE-XR) 500 mg, Oral, 2 times daily with meals    metroNIDAZOLE (FLAGYL) 500 mg, Oral, 2 times daily    metroNIDAZOLE (FLAGYL) 500 mg, Oral, 2 times daily    nitrofurantoin, macrocrystal-monohydrate, (MACROBID) 100 MG  "capsule 100 mg, 2 times daily    VAGIFEM 10 mcg Tab Place vaginally.       Past Surgical History:   Procedure Laterality Date    CHOLECYSTECTOMY         Social History     Substance and Sexual Activity   Drug Use Never     Alcohol Use: Not At Risk (4/23/2024)    AUDIT-C     Frequency of Alcohol Consumption: Monthly or less     Average Number of Drinks: 1 or 2     Frequency of Binge Drinking: Never     Tobacco Use: Low Risk  (5/7/2024)    Patient History     Smoking Tobacco Use: Never     Smokeless Tobacco Use: Never     Passive Exposure: Not on file       OBJECTIVE:     Vital Signs Range (Last 24H):         Significant Labs    Heme Profile  Lab Results   Component Value Date    WBC 5.79 05/07/2024    HGB 12.2 05/07/2024    HCT 36.6 (L) 05/07/2024     05/07/2024       Coagulation Studies  Lab Results   Component Value Date    LABPROT 10.9 05/07/2024    INR 1.0 05/07/2024       BMP  Lab Results   Component Value Date     05/07/2024    K 4.1 05/07/2024     05/07/2024    CO2 24 05/07/2024    BUN 12 05/07/2024    CREATININE 0.8 05/07/2024       Liver Function Tests  Lab Results   Component Value Date    AST 41 (H) 05/07/2024    ALT 23 05/07/2024    ALKPHOS 64 05/07/2024    BILITOT 0.7 05/07/2024    PROT 7.1 05/07/2024    ALBUMIN 4.1 05/07/2024       Lipid Profile  No results found for: "CHOL", "HDL", "LDLDIRECT", "TRIG"    Endocrine Profile  No results found for: "HGBA1C", "TSH"      Cardiac Studies    EKG:   No results found for this or any previous visit.    EREN  No results found for this or any previous visit.      TTE  No results found for this or any previous visit.      ASSESSMENT/PLAN:         Pre-op Assessment    I have reviewed the Patient Summary Reports.     I have reviewed the Nursing Notes. I have reviewed the NPO Status.   I have reviewed the Medications.     Review of Systems  Anesthesia Hx:  No problems with previous Anesthesia               Denies Personal Hx of Anesthesia " complications.                    Social:  Non-Smoker       Hematology/Oncology:  Hematology Normal                     Current/Recent Cancer.            Oncology Comments: Malignant neoplasm of sigmoid colon     EENT/Dental:  EENT/Dental Normal           Cardiovascular:  Cardiovascular Normal                                            Pulmonary:  Pulmonary Normal                       Renal/:  Renal/ Normal                 Hepatic/GI:        Malignant neoplasm of sigmoid colon with mets to liver          Musculoskeletal:  Musculoskeletal Normal                Neurological:  Neurology Normal                                      Endocrine:  Endocrine Normal                Physical Exam  General: Well nourished    Airway:  Mouth Opening: Normal  TM Distance: Normal  Neck ROM: Normal ROM        Anesthesia Plan  Type of Anesthesia, risks & benefits discussed:    Anesthesia Type: Gen ETT  Intra-op Monitoring Plan: Standard ASA Monitors  Post Op Pain Control Plan: multimodal analgesia and IV/PO Opioids PRN  Induction:  IV  Airway Plan: Direct, Post-Induction  Informed Consent: Informed consent signed with the Patient and all parties understand the risks and agree with anesthesia plan.  All questions answered.   ASA Score: 3  Day of Surgery Review of History & Physical: H&P Update referred to the surgeon/provider.    Ready For Surgery From Anesthesia Perspective.     .

## 2024-05-09 NOTE — OP NOTE
"Date of procedure:   May 9, 2024    Indications for procedure:  48 yo female with left colon cancer, stage IV with bilobar hepatic metastases status post induction chemotherapy with excellent response and marked regression of liver metastases.  Only one lesion can now be identified on repeat staging imaging.  The patient's case was presented at metastatic cancer tumor board and the recommendation was for sigmoid colectomy and possible ablation of the residual solitary liver metastasis.  The patient, her  and I have had a mars discussion regarding the complexity of the decision making process given her asymptomatic state and the her known metastatic disease.  There is now what appears to be a window for resection of the primary tumor and the patient and her  strongly desire primary tumor resection.  The details of robotic sigmoid colectomy, functional consequences of segmental resection, expected recuperation and return to treatment of her metastatic disease was discussed explicitly.  The risks of surgery including bleeding, infection, bowel obstruction, anastomotic leakage, ureteral injury and need for reoperation was discussed.  In addition, the patient and her  understand that creation at some point during her surgical treatment could be necessary especially given her remote history of pelvic irradiation for cervical cancer.     Preoperative diagnosis:   Sigmoid colon cancer    Postoperative diagnosis:  Same    Name of procedure:  Robotic sigmoid colectomy, open low anterior resection, splenic flexure mobilization, descending colon to mid rectal anastomosis, diverting loop ileostomy, flexible sigmoidoscopy    Surgeon:   SANTIAGO Paris MD    Assistant surgeon:  Sincere Madsen MD    Type of anesthesia:  GETA    EBL:  300  Cc's    Drains:  19 Gaurav drain to pelvis    Specimen:     Portion of rectum, sigmoid colon  Additional resection of rectum  "  "  Distal anastomotic ring  Proximal " anastomotic ring    Findings:   No visible or palpable liver metastases  No peritoneal disease  Proximal sigmoid colon cancer  Extensive adhesions of left colon, sigmoid colon  Evidence of radiation changes of the terminal ileum, sigmoid colon and rectum  Fragile, chronic irradiated proximal rectum which was not suitable for anastomosis and necessitated low anterior resection with mid rectal anastomosis.  Splenic flexure mobilization was performed.  ICG scintigraphy confirmed excellent perfusion of the proximal conduit (descending colon) as well as the distal conduit (rectum)  Flexible sigmoidoscopy confirmed an intact, hemostatic and air tight anastomosis  Diverting ileostomy was performed due tto the chronic irradiation change and fragility of the rectal remnant.      Technique in detail:  The patient was brought to the operating room positively identified and placed on the operating table in supine position with sequential compression devices on her lower extremities.  An enhanced recovery protocol was initiated preoperatively and observe intraoperatively.  Specifically, bowel preparation was performed with both oral mechanical and antibiotic preparation.  Multimodal analgesia and deep venous thrombosis prophylaxis was implemented.  The patient received intravenous antibiotics.  She underwent general endotracheal anesthesia without complication.  She was on a patent should positioning system.  Both arms were wrapped with foam and tucked at her side.  A strap was placed across her upper torso.  She was positioned in the modified lithotomy position and padded Yellofin stirrups.  All pressure points were padded.    A critical time-out was performed.    The urology team came into the operating room and performed cystoscopy, bilateral ureteral catheter insertion and Gunn catheter insertion.  The patient's abdomen and perineum were prepared and draped in usual fashion.    Veress needle was introduced at [facility]  point.  5 mm trocar was introduced into the peritoneal cavity following saline drop test.  Insufflation was performed using carbon dioxide gas and pressures were monitored throughout the procedure.  Robotic fortunately brought onto the field and inserted into the peritoneal cavity under laparoscopic guidance.  Four 8 mm ports were introduced in a diagonal fashion with a port for being placed 2 fingerbreadths medial to the anterior superior iliac spine in the right lower quadrant and subsequent were placed along this diagonal line to wild's point 8-9 cm apart.  A 5 mm AirSeal port was placed in the right upper quadrant to serve as the assistant port.  Under laparoscopic guidance we carefully explored the abdomen.  There was no visible evidence of peritoneal disease.  The liver was carefully inspected as were both diaphragms.  There were no visible lesions of the liver.  The patient was placed in Trendelenburg position.  The omentum and transverse colon were placed the upper abdomen.  We could identify the ovarian pinning with the ovaries being fixed to the peritoneum in the mid to upper colonic gutters lateral to the descending colon on the left side.  The small bowel was swept to the right of the aorta exposing the central retroperitoneum.  The sigmoid colon was noted to be adherent to the left anterior abdominal wall and the anterior abdominal wall in the pelvis.  These appeared to be postsurgical adhesions.  There was noted to be a tattoo present in the mid to the proximal sigmoid colon consistent with the patient's known in the sigmoid colon cancer.  The uterus was surgically absent all the initially this could not be appreciated because of the pelvic adhesions.    The de Petr XI robot was brought onto the field and camera was introduced through port 3 and targeting of the sigmoid colon was undertaken.  A 2 left handed technique was employed.  Because of the adhesions of the sigmoid colon distorting the left  colonic mesentery the decision was made to divide these adhesions both in the pelvis and along the left colic gutter to restore normal mesenteric an anatomic perspective.  This was performed sharply using scissor dissection.  There was no evidence of malignant infiltration of the peritoneum.  These adhesions appeared to be postsurgical.  It was noted that there was radiation change of the sigmoid colon.  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  To expose the pelvis it was necessary to mobilize the cecum and terminal ileum out of the pelvis such that the pelvic inlet could be exposed.  We proceeded with a lateral to medial mobilization of the left colon.  This was noted to be tedious and difficult due to the patient's post surgical adhesions most likely related to her ovarian surgery.  The left ureter and left gonadal vessels were carefully preserved harm.      The right side of the left colonic mesentery was then carefully scored beginning at the sacral promontory with mobilization of the superior hemorrhoidal artery back to the root of the inferior mesenteric artery.  The vessel skeletonized clipped proximally twice and distally once and divided using scissors.  Medial to lateral dissection was employed of the left colon mesentery up to the level of the base of the pancreas in anticipation of complete splenic flexure mobilization.  The inferior mesenteric vein was carefully mobilized from the ligament of Treitz skeletonized and divided using the vessel sealer device.  After doing so, the lesser sac was entered by dissecting over the pancreas and we divided pancreatic adhesions from the ligament of Treitz out to the midportion of the pancreas at which point we had some difficulty with the exposure.  The decision was made to proceed from lateral to medial.  The omentum was completely dissected off of the transverse colon and splenic and gastrocolic  attachments were divided under direct vision.  We then completed the splenic flexure mobilization by dissecting the embryologic adhesions to the inferior border of the pancreas such that the left colonic mesentery was completely mobilized to the middle colic pedicle.    Distally, the distal sigmoid colon and rectosigmoid junction were sharply dissected using scissor dissection.  Again this was noted to be quite tedious due to the patient's prior radiation as well as post hysterectomy surgery.  This dissection to more than 2 hours ultimately in terms of pelvic adhesions and justifies the use of the 22 modifier.  Carefully mobilized the upper rectum 1st posteriorly and then laterally in anticipation of colorectal anastomosis.  We divided the mesorectum using the vessel sealer device at the upper rectum in anticipation of division of the bowel through a Pfannenstiel incision as it was felt that assessment of the bowel characteristics would be best appreciated using both manual palpation as well as visible inspection.  Robotically we then divided the mesentery proximally for a planned resection in the mid descending colon to obtain adequate proximal margins from the proximal sigmoid colon cancer.  The ascending branch of the left colic artery as well as inferior mesenteric vein were divided using the vessel sealer device.  The mesentery was divided out to the mid descending colon using the vessel sealer device.  We performed ICG scintigraphy and confirmed excellent perfusion of the descending colon in the rectum.    Pneumoperitoneum was relieved robot undocked.  Previous V type Pfannenstiel incision was identified and incised to create a wound through which we would perform transection distally, proximally and to facilitate the anastomosis.  Following incision of the skin we opened the subcutaneous tissues and the rectus fascia transversely and the posterior aspects of the wound were opened vertically dividing the  muscle bluntly.  A GelPort was placed into this wound.  Through this wound protector we identified the rectosigmoid junction.  I exteriorized the sigmoid colon and left colon and divided the bowel proximally using pursestring clamp and Monosof suture on a Fernando needle.  A crushing clamp was placed distally in the bowel was transected proximally using the scalpel.  A 29 CDH stapler was brought onto the field and the anvil was placed into the bowel proximally in the pursestring suture was securely tied down.  The descending colon was then placed into the peritoneal cavity.  The patient was placed in Trendelenburg position and the small bowel and left colon were packed into the upper abdomen exposing the pelvis.  The divided mesorectum was carefully inspected.  We placed a 45 mm contour stapler with a green cartridge across this area.  The instrument was closed and allowed to compress for 1 minute and fired.  The specimen was removed from the operative field.  Upon careful inspection I was not satisfied with the distal aspect of the initial transection as I felt that additional rectal muscular tube should be resected to correspond with the divided mesorectum.  Sutures were placed at the staple line and the stapler was reintroduced with a fresh green cartridge and placed a cross the rectal muscular tube to resect an additional 1-1/2 cm.  Again the instrument was closed at this level allowed to compress for 1 minute and fired.  This additional specimen was sent to Surgical pathology as additional rectum, new distal resection margin.    The end of the descending colon easily reached below the level of the sacral promontory for a tension-free anastomosis.  The assistant surgeon went to the perineum and dilated the anus to 2 fingerbreadths.  Intestinal sizers were placed transanally into the rectum and advanced to the top of the conduit with a effacement of the staple line.  However, during the course of insertion of the  3rd intestinal sizer it was noted that the sizer perforated the posterior wall into the mesorectum.  The staple line itself was intact.  Unfortunately, this occurrence revealed the fragility of the proximal rectal stump.  The sizer was removed.      Given the above incident, I proceeded to laparotomy making an incision from just below the umbilicus down to the prior transverse type incision.  The linea alba was opened along the length of the skin incision.  A large Ronnie retractor was placed into the wound.  We carefully inspected the pelvis and irrigated  To salvage colorectal anastomosis anastomosis the decision was made to transect the rectal conduit below the level of the laceration thereby removing or resecting the rectal perforation.  I mobilized the mesorectum posteriorly into the deep pelvis using lighted deep pelvic retractors for exposure.  The mesorectum was mobilized laterally and anteriorly such that we could easily visualize the perforation for accurate transection distally..  The mesorectum corresponding to this new site of transection was divided using the LigaSure device.  Sutures again were placed on the rectal muscular tube and the contour stapler was placed distal to the laceration and after allowing the tissues to compress for 1 minute the stapler was fired.  This portion of the rectum was sent to Surgical pathology.  We performed flexible sigmoidoscopy.  The mucosa was noted to be pink and viable and there was excellent compliance of the rectal remnant.  Importantly, there was no bubbling or air leak following distention of the rectum with air and with the anastomotic line submerged in saline.  We proceeded with end-to-end colorectal anastomosis in standard fashion with the spike being extruded just anterior to the staple line.  The descending colon was brought into proximity with care being taken to orient the mesentery.  It easily reached the mid rectum without tension and the instrument was  mated, cinched down and fired following compression for 1 minute.  Two complete anastomotic donuts were recovered and sent separately to Surgical pathology.  Flexible sigmoidoscopy was performed confirming an intact hemostatic anastomosis which was airtight.  The bowel was remarkable for pink viable mucosa of both the descending colon conduit as well as the residual rectum.    Pelvis was irrigated with saline solution.  Hemostasis was assured.  A pelvic drain was placed in view of the above anastomotic issues.  Stab incision was made in the left lower quadrant lateral to the rectus sheath and the drain was secured to the skin with nylon suture.  The drain was placed in pelvis.    The small bowel was returned to its anatomic position.  Carefully inspected the terminal ileum were radiation changes were noted.  There was 1 small serotomy just proximal to the ileocecal valve which was repaired with interrupted Vicryl suture.  Approximately 30 cm proximal to the ileocecal valve a transition from normal-appearing ileum to irradiated bowel was noted.  Just proximal to this we decided to use this ileum as a diverting loop ileostomy.  An aperture was created in the right lower quadrant at the superior aspect of the stoma triangle.  The aperture was created overlying the rectus sheath.  A disc of skin was excised in the subcutaneous tissues and anterior sheath were opened vertically using the Bovie electrocautery.  The muscle was split bluntly in the posterior elements were opened over a sponge vertically with care being taken to avoid injury to the underlying viscera.  The doctor was dilated to 2 fingerbreadths.  Ileum to be brought up as a loop stoma was covered with Seprafilm, grasped with a Tonia clamp and brought up through the aperture with care being taken to properly orient the mesentery.  Transversus abdominis plane block was performed using Exparel, Marcaine and saline solution.  Additional sheets of Seprafilm  were placed over the contents of the peritoneal cavity.    We changed gown and gloves and used a separate closing tray to close the wound.  The posterior elements were closed using continuous 0 Vicryl suture.  The anterior rectus fascia of the low-transverse incision was approximated using interrupted figure-of-eight sutures of 1 PDS.  Where the vertical incision the transverse incision intersected we carefully approximated this with 1 PDS suture.  The vertical aspect of the fascial defect was then approximated using interrupted figure-of-eight sutures of 1 PDS.  Wound was inspected and hemostasis was assured.  It was irrigated with saline solution.  All robotic port site wounds were approximated using subcuticular Monocryl.  The midline wound and the transverse wounds were approximated using continuous subcuticular Monocryl and Dermabond was placed over all incisions.    The patient tolerated the procedure well.  She was returned to the supine position.  Ureteral catheters were removed and the Gunn was left in place.  The drain was placed to bulb suction.  The patient was awakened from anesthesia, extubated without incident and transported to the recovery area in stable condition.        Colon Resection  Operation performed with curative intent No   Tumor Location Sigmoid colon   Extent of colon and vascular resection Other low anterior resection necessary due to chronic irradiated and fragile rectum.

## 2024-05-09 NOTE — PROGRESS NOTES
Spoke w/ Ratna Coffey and informed her pt still needs urology consent for dede caths, Urolgoy was unaware of case, she will come and do it.

## 2024-05-09 NOTE — BRIEF OP NOTE
[facility] - Surgery ([facility])  Brief Operative Note    SUMMARY     Surgery Date: 5/9/2024     Surgeons and Role:  Panel 1:     * SANTIAGO Paris MD - Primary     * Vishal Reyes MD - Resident - Assisting     * Sincere Madsen MD - Fellow     * Kimo Yuan MD  Panel 2:     * Cas Bang MD - Primary     * Kole Mehta MD - Resident - Assisting        Pre-op Diagnosis:  Cancer of sigmoid colon [C18.7]    Post-op Diagnosis:  Post-Op Diagnosis Codes:     * Cancer of sigmoid colon [C18.7]    Procedure(s) (LRB):  XI ROBOTIC COLECTOMY, SIGMOID (N/A)  CYSTOSCOPY, WITH URETERAL STENT INSERTION (Bilateral)  SIGMOIDOSCOPY, FLEXIBLE (N/A)  MOBILIZATION, SPLENIC FLEXURE, LAPAROSCOPIC  LYSIS, ADHESIONS    Anesthesia: General    Implants:  Implant Name Type Inv. Item Serial No.  Lot No. LRB No. Used Action   BARRIER SEPRAFILM ADHESION - [id number]  BARRIER SEPRAFILM ADHESION  [employer] [id number]  1 Implanted       Operative Findings: Dense adhesions throughout pelvis requiring lysis. Splenic flexure mobilized for length. Sigmoid colon removed with high ligation of OSIRIS. Colorectal anastomosis with 29mm EEA stapler. Drain in pelvis. Diverting loop ileostomy due to tissue quality.    Estimated Blood Loss: =250mL    exte         Specimens:   Specimen (24h ago, onward)       Start     Ordered    Pending  Specimen to Pathology, Surgery Other  Once        Comments: Pre-op Diagnosis: Cancer of sigmoid colon [C18.7]Procedure(s):XI ROBOTIC COLECTOMY, SIGMOIDRADIOFREQUENCY ABLATION, LESION, LIVERCYSTOSCOPY, WITH URETERAL STENT INSERTIONSIGMOIDOSCOPY, FLEXIBLE Number of specimens: 6Name of specimens:1. Portion of rectum and sigmoid colon - permanent2. Distal rectal margin - permanent 3. Portion of mesorectum - permanent 4. Part of rectum - permanent 5. Proximal anastomotic ring - permanent 6. Distal anastomotic ring - permanent     References:    Click here for ordering Quick Tip   Question Answer  Comment   Procedure Type: Other    Release to patient Immediate        Pending                    [id number]

## 2024-05-09 NOTE — NURSING
Nurses Note -- 4 Eyes      5/9/2024   6:00 PM      Skin assessed during: Admit      [] No Altered Skin Integrity Present    []Prevention Measures Documented      [x] Yes- Altered Skin Integrity Present or Discovered   [x] LDA Added if Not in Epic (Describe Wound)   [] New Altered Skin Integrity was Present on Admit and Documented in LDA   [] Wound Image Taken    Wound Care Consulted? No    Attending Nurse:  OBI Skinner    Second RN/Staff Member:   OBI Lindo    Lap sites x 5 on abdomen - dermabond  Midline SI and transverse SI - dermabond     Patient admitted from PACU via bed. VS taken - see flowsheet. Oriented to room, call bell, whiteboard, and fall contract. Patient in 5/10 pain and minimal nausea. RN will reach out to service for PRN orders.

## 2024-05-09 NOTE — NURSING
Fred Jaimes MD notified of patients MANDEEP drain output - 170ml of bloody output in 1 hour since admission to [facility]. Instructed to monitor for now.

## 2024-05-09 NOTE — ANESTHESIA PROCEDURE NOTES
Intubation    Date/Time: 5/9/2024 7:51 AM    Performed by: Joselyn Santamaria CRNA  Authorized by: Victoria Mccauley MD    Intubation:     Induction:  Intravenous    Intubated:  Postinduction    Mask Ventilation:  Easy mask    Attempts:  1    Attempted By:  CRNA    Method of Intubation:  Direct    Blade:  Boone 2    Laryngeal View Grade: Grade I - full view of cords      Difficult Airway Encountered?: No      Complications:  None    Airway Device:  Oral endotracheal tube    Airway Device Size:  7.5    Style/Cuff Inflation:  Cuffed (inflated to minimal occlusive pressure)    Tube secured:  22    Secured at:  The lips    Placement Verified By:  Capnometry    Complicating Factors:  None    Findings Post-Intubation:  BS equal bilateral and atraumatic/condition of teeth unchanged

## 2024-05-10 LAB
ANION GAP SERPL CALC-SCNC: 8 MMOL/L (ref 8–16)
BASOPHILS # BLD AUTO: 0.04 K/UL (ref 0–0.2)
BASOPHILS NFR BLD: 0.7 % (ref 0–1.9)
BUN SERPL-MCNC: 5 MG/DL (ref 6–20)
CALCIUM SERPL-MCNC: 8.2 MG/DL (ref 8.7–10.5)
CHLORIDE SERPL-SCNC: 111 MMOL/L (ref 95–110)
CO2 SERPL-SCNC: 20 MMOL/L (ref 23–29)
CREAT SERPL-MCNC: 0.9 MG/DL (ref 0.5–1.4)
DIFFERENTIAL METHOD BLD: ABNORMAL
EOSINOPHIL # BLD AUTO: 0 K/UL (ref 0–0.5)
EOSINOPHIL NFR BLD: 0.2 % (ref 0–8)
ERYTHROCYTE [DISTWIDTH] IN BLOOD BY AUTOMATED COUNT: 13.7 % (ref 11.5–14.5)
EST. GFR  (NO RACE VARIABLE): >60 ML/MIN/1.73 M^2
GLUCOSE SERPL-MCNC: 102 MG/DL (ref 70–110)
HCT VFR BLD AUTO: 35 % (ref 37–48.5)
HGB BLD-MCNC: 11.2 G/DL (ref 12–16)
HGB BLD-MCNC: 11.2 G/DL (ref 12–16)
IMM GRANULOCYTES # BLD AUTO: 0.01 K/UL (ref 0–0.04)
IMM GRANULOCYTES NFR BLD AUTO: 0.2 % (ref 0–0.5)
LYMPHOCYTES # BLD AUTO: 0.8 K/UL (ref 1–4.8)
LYMPHOCYTES NFR BLD: 13.1 % (ref 18–48)
MAGNESIUM SERPL-MCNC: 1.9 MG/DL (ref 1.6–2.6)
MCH RBC QN AUTO: 31.5 PG (ref 27–31)
MCHC RBC AUTO-ENTMCNC: 32 G/DL (ref 32–36)
MCV RBC AUTO: 99 FL (ref 82–98)
MONOCYTES # BLD AUTO: 0.5 K/UL (ref 0.3–1)
MONOCYTES NFR BLD: 8.8 % (ref 4–15)
NEUTROPHILS # BLD AUTO: 4.5 K/UL (ref 1.8–7.7)
NEUTROPHILS NFR BLD: 77 % (ref 38–73)
NRBC BLD-RTO: 0 /100 WBC
PHOSPHATE SERPL-MCNC: 4.4 MG/DL (ref 2.7–4.5)
PLATELET # BLD AUTO: 269 K/UL (ref 150–450)
PMV BLD AUTO: 10.6 FL (ref 9.2–12.9)
POTASSIUM SERPL-SCNC: 3.9 MMOL/L (ref 3.5–5.1)
RBC # BLD AUTO: 3.55 M/UL (ref 4–5.4)
SODIUM SERPL-SCNC: 139 MMOL/L (ref 136–145)
WBC # BLD AUTO: 5.89 K/UL (ref 3.9–12.7)

## 2024-05-10 PROCEDURE — 63600175 PHARM REV CODE 636 W HCPCS: Performed by: STUDENT IN AN ORGANIZED HEALTH CARE EDUCATION/TRAINING PROGRAM

## 2024-05-10 PROCEDURE — 85025 COMPLETE CBC W/AUTO DIFF WBC: CPT | Performed by: STUDENT IN AN ORGANIZED HEALTH CARE EDUCATION/TRAINING PROGRAM

## 2024-05-10 PROCEDURE — 25000003 PHARM REV CODE 250: Performed by: STUDENT IN AN ORGANIZED HEALTH CARE EDUCATION/TRAINING PROGRAM

## 2024-05-10 PROCEDURE — 25000003 PHARM REV CODE 250

## 2024-05-10 PROCEDURE — 84100 ASSAY OF PHOSPHORUS: CPT | Performed by: STUDENT IN AN ORGANIZED HEALTH CARE EDUCATION/TRAINING PROGRAM

## 2024-05-10 PROCEDURE — 20600001 HC STEP DOWN PRIVATE ROOM

## 2024-05-10 PROCEDURE — 80048 BASIC METABOLIC PNL TOTAL CA: CPT | Performed by: STUDENT IN AN ORGANIZED HEALTH CARE EDUCATION/TRAINING PROGRAM

## 2024-05-10 PROCEDURE — 85018 HEMOGLOBIN: CPT | Performed by: STUDENT IN AN ORGANIZED HEALTH CARE EDUCATION/TRAINING PROGRAM

## 2024-05-10 PROCEDURE — 36415 COLL VENOUS BLD VENIPUNCTURE: CPT | Performed by: STUDENT IN AN ORGANIZED HEALTH CARE EDUCATION/TRAINING PROGRAM

## 2024-05-10 PROCEDURE — 25000003 PHARM REV CODE 250: Performed by: NURSE PRACTITIONER

## 2024-05-10 PROCEDURE — 83735 ASSAY OF MAGNESIUM: CPT | Performed by: STUDENT IN AN ORGANIZED HEALTH CARE EDUCATION/TRAINING PROGRAM

## 2024-05-10 RX ORDER — KETOROLAC TROMETHAMINE 15 MG/ML
15 INJECTION, SOLUTION INTRAMUSCULAR; INTRAVENOUS EVERY 6 HOURS
Status: DISCONTINUED | OUTPATIENT
Start: 2024-05-10 | End: 2024-05-13 | Stop reason: HOSPADM

## 2024-05-10 RX ADMIN — PIPERACILLIN SODIUM AND TAZOBACTAM SODIUM 4.5 G: 4; .5 INJECTION, POWDER, FOR SOLUTION INTRAVENOUS at 05:05

## 2024-05-10 RX ADMIN — SODIUM CHLORIDE, POTASSIUM CHLORIDE, SODIUM LACTATE AND CALCIUM CHLORIDE 1000 ML: 600; 310; 30; 20 INJECTION, SOLUTION INTRAVENOUS at 05:05

## 2024-05-10 RX ADMIN — PIPERACILLIN SODIUM AND TAZOBACTAM SODIUM 4.5 G: 4; .5 INJECTION, POWDER, FOR SOLUTION INTRAVENOUS at 12:05

## 2024-05-10 RX ADMIN — SODIUM CHLORIDE: 9 INJECTION, SOLUTION INTRAVENOUS at 12:05

## 2024-05-10 RX ADMIN — SODIUM CHLORIDE: 9 INJECTION, SOLUTION INTRAVENOUS at 03:05

## 2024-05-10 RX ADMIN — KETOROLAC TROMETHAMINE 15 MG: 15 INJECTION, SOLUTION INTRAMUSCULAR; INTRAVENOUS at 05:05

## 2024-05-10 RX ADMIN — ACETAMINOPHEN 1000 MG: 500 TABLET ORAL at 09:05

## 2024-05-10 RX ADMIN — GABAPENTIN 300 MG: 300 CAPSULE ORAL at 08:05

## 2024-05-10 RX ADMIN — SODIUM CHLORIDE 500 ML: 9 INJECTION, SOLUTION INTRAVENOUS at 04:05

## 2024-05-10 RX ADMIN — MUPIROCIN: 20 OINTMENT TOPICAL at 09:05

## 2024-05-10 RX ADMIN — GABAPENTIN 300 MG: 300 CAPSULE ORAL at 09:05

## 2024-05-10 RX ADMIN — ACETAMINOPHEN 1000 MG: 10 INJECTION, SOLUTION INTRAVENOUS at 05:05

## 2024-05-10 RX ADMIN — ACETAMINOPHEN 1000 MG: 10 INJECTION, SOLUTION INTRAVENOUS at 01:05

## 2024-05-10 RX ADMIN — PIPERACILLIN SODIUM AND TAZOBACTAM SODIUM 4.5 G: 4; .5 INJECTION, POWDER, FOR SOLUTION INTRAVENOUS at 08:05

## 2024-05-10 RX ADMIN — GABAPENTIN 300 MG: 300 CAPSULE ORAL at 02:05

## 2024-05-10 RX ADMIN — MUPIROCIN: 20 OINTMENT TOPICAL at 08:05

## 2024-05-10 NOTE — PROGRESS NOTES
Colon and Rectal Surgery Progress Note    Patient name: Collette Castro   YOB: 1975   MRN: [id number]    Patient Name: Collette Castro  Date: 5/10/2024    Subjective:  Hypotensive overnight, HR 80s. Patient asymptomatic and states she runs in the 90s at home. 1.5L of crystalloid bolused. UOP good. AM hgb within acceptable range    Medications:    Current Facility-Administered Medications:     0.9%  NaCl infusion, , Intravenous, Continuous, Chato Ayala MD, Last Rate: 100 mL/hr at 05/10/24 0301, New Bag at 05/10/24 0301    acetaminophen 1,000 mg/100 mL (10 mg/mL) injection 1,000 mg, 1,000 mg, Intravenous, Q8H, Stopped at 05/10/24 0542 **FOLLOWED BY** acetaminophen tablet 1,000 mg, 1,000 mg, Oral, Q8H, Sincere Madsen MD    gabapentin capsule 300 mg, 300 mg, Oral, TID, Natalya Rivera NP, 300 mg at 05/09/24 2134    HYDROmorphone injection 1 mg, 1 mg, Intravenous, Q4H PRN, Sincere Madsen MD    mupirocin 2 % ointment, , Nasal, BID, Sincere Madsen MD, Given at 05/09/24 2134    ondansetron disintegrating tablet 8 mg, 8 mg, Oral, Q8H PRN, Sincere Madsen MD    piperacillin-tazobactam (ZOSYN) 4.5 g in dextrose 5 % in water (D5W) 100 mL IVPB (MB+), 4.5 g, Intravenous, Q8H, Sincere Madsen MD, Stopped at 05/10/24 0403    sodium chloride 0.9% flush 10 mL, 10 mL, Intra-Catheter, PRN, Sincere Madsen MD    Vital Signs:  Vitals:    05/10/24 0402   BP: (!) 81/49   Pulse: 84   Resp:    Temp:        In/Out:  Intake/Output - Last 3 Shifts         05/08 0700  05/09 0659 05/09 0700  05/10 0659 05/10 0700 05/11 0659    IV Piggyback  4314.3     Total Intake(mL/kg)  4314.3 (61.4)     Urine (mL/kg/hr)  1455 (0.9)     Drains  550     Stool  250     Total Output  2255     Net  +2059.3            Stool Occurrence  2 x             Physical Exam:  General: Alert, oriented, in no apparent distress  HEENT: Sclera anicteric, trachea midline  Lungs: Normal respiratory rate and effort on room air  Abdomen: Soft,  "appropriate haja-incisional TTP, nondistended. Incisions clean/dry/intact without erythema or drainage. MANDEEP drains SS. Ileostomy viable and functional.  Extremities: Warm, well perfused, no edema, SCDs in place  Neuro: Grossly intact, moves all extremities  Psych: Appropriate affect    Laboratory Studies:  Complete Blood Count:  Lab Results   Component Value Date/Time    WBC 5.89 05/10/2024 05:31 AM    HGB 11.2 (L) 05/10/2024 05:31 AM    HCT 35.0 (L) 05/10/2024 05:31 AM    RBC 3.55 (L) 05/10/2024 05:31 AM     05/10/2024 05:31 AM       Basic Chemistry Panel:  Lab Results   Component Value Date/Time     05/10/2024 05:31 AM    K 3.9 05/10/2024 05:31 AM     (H) 05/10/2024 05:31 AM    CO2 20 (L) 05/10/2024 05:31 AM    BUN 5 (L) 05/10/2024 05:31 AM    CREATININE 0.9 05/10/2024 05:31 AM    CALCIUM 8.2 (L) 05/10/2024 05:31 AM       No results found for: "CRP"    Imaging Studies:  None new    Assessment:  Collette Castro is a 49 y.o. year old female with history of sigmoid cancer now s/p robotic converted to open sigmoidectomy with diverting loop ileostomy 5/9    Plan:  Feels well this morning.   Hypotensive overnight. Given 1.5L of cyrstalloid. Drain high output but SS in nature. HR in 80s. Patient asymptomatic and states she runs in the 90s-100s at home. Hgb 11.2 this AM. Will monitor  Repeat hgb this afternoon  MMPR- continue, unable to take oral narcotics. Restart toradol if hgb stable  IVF stop  Advance CLD to LRD  Ileostomy functioning, ostomy nurse consulted  Vte ppx: SCD, hold lovenox until hgb stable  Ambulate  Continue chamorro until tomorrow        Sincere Madsen MD  Colorectal Surgery Fellow   "

## 2024-05-10 NOTE — NURSING
MD Oh paged to bedside to assess midline SI. Dermabond was accidentally removed during ostomy exchange and SI began to ooze serosang fluid. MD placed steri strips along incision with gauze. Will continue to monitor for increased drainage.

## 2024-05-10 NOTE — PLAN OF CARE
[facility] Plan of Care Note  Dx Malignant Neoplasm of Sigmoid Colon    Shift Events Patient very pleasant and involved in care. A/Ox4 on RA. Pt endorses some discomfort and soreness from surgical incisions that is well managed with scheduled pain regimen. Pt hypotensive overnight 80s/50s. Fred Jaimes MD notified. 500cc NS bolus ordered and given. Continues to monitor patient for duration of shift.    Goals of Care: Pain mgmt. Increased mobility. Ostomy education    Neuro: A/Ox4    Vital Signs: BP 80s/50s. Other VSS    Respiratory: RA    Diet: Clear liquid    Is patient tolerating current diet? Yes    GTTS: NS @ 100 ml    Urine Output/Bowel Movement: Adequate urine output. Liquid stool emptied from ostomy    Drains/Tubes/Tube Feeds (include total output/shift): MANDEEP drain 140ml bloody output     Lines: PIV x2      Accuchecks:N/A    Skin: 3 lap sites. Mid-abdominal/transverse incision closed with dermabond    Fall Risk Score: 11    Activity level? Assist x1    Any scheduled procedures? N/A    Any safety concerns? N/A    Other: N/A     Problem: Adult Inpatient Plan of Care  Goal: Plan of Care Review  Outcome: Progressing  Goal: Absence of Hospital-Acquired Illness or Injury  Outcome: Progressing  Goal: Optimal Comfort and Wellbeing  Outcome: Progressing  Goal: Readiness for Transition of Care  Outcome: Progressing     Problem: Infection  Goal: Absence of Infection Signs and Symptoms  Outcome: Progressing     Problem: Wound  Goal: Absence of Infection Signs and Symptoms  Outcome: Progressing  Goal: Improved Oral Intake  Outcome: Progressing  Goal: Optimal Pain Control and Function  Outcome: Progressing  Goal: Skin Health and Integrity  Outcome: Progressing  Goal: Optimal Wound Healing  Outcome: Progressing     Problem: Fall Injury Risk  Goal: Absence of Fall and Fall-Related Injury  Outcome: Progressing

## 2024-05-10 NOTE — ANESTHESIA POSTPROCEDURE EVALUATION
Anesthesia Post Evaluation    Patient: Collette Castro    Procedure(s) Performed: Procedure(s) (LRB):  XI ROBOTIC COLECTOMY, SIGMOID (N/A)  CYSTOSCOPY, WITH URETERAL STENT INSERTION (Bilateral)  SIGMOIDOSCOPY, FLEXIBLE (N/A)  MOBILIZATION, SPLENIC FLEXURE, LAPAROSCOPIC  LYSIS, ADHESIONS  RESECTION, COLON, LOW ANTERIOR, robotic converted to open (N/A)    Final Anesthesia Type: general      Patient location during evaluation: PACU  Patient participation: No - Unable to Participate, Sedation  Level of consciousness: sedated  Post-procedure vital signs: reviewed and stable  Pain management: adequate  Airway patency: patent    PONV status at discharge: No PONV  Anesthetic complications: no      Cardiovascular status: hemodynamically stable  Respiratory status: unassisted and spontaneous ventilation  Hydration status: euvolemic  Follow-up not needed.          Vitals Value Taken Time   BP 85/54 05/10/24 0749   Temp 36.8 °C (98.2 °F) 05/10/24 0749   Pulse 78 05/10/24 0749   Resp 16 05/10/24 0717   SpO2 96 % 05/10/24 0749         Event Time   Out of Recovery 15:48:09         Pain/Timothy Score: Pain Rating Prior to Med Admin: 4 (5/10/2024  5:20 AM)  Pain Rating Post Med Admin: 2 (5/9/2024  9:51 PM)  Timothy Score: 10 (5/9/2024  3:30 PM)

## 2024-05-10 NOTE — CONSULTS
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 Analyses with [employer]-Expanded (77 genes)    Pre-diabetes     Sphincter of Oddi dysfunction        Social History     Socioeconomic History    Marital status:    Tobacco Use    Smoking status: Never    Smokeless tobacco: Never   Substance and Sexual Activity    Alcohol use: Not Currently     Comment: once a month    Drug use: Never    Sexual activity: Yes     Partners: Male     Birth control/protection: See Surgical Hx     Social Determinants of Health     Financial Resource Strain: Low Risk  (4/23/2024)    Overall Financial Resource Strain (CARDIA)     Difficulty of Paying Living Expenses: Not hard at all   Food Insecurity: No Food Insecurity (4/23/2024)    Hunger Vital Sign     Worried About Running Out of Food in the Last Year: Never true     Ran Out of Food in the Last Year: Never true   Transportation Needs: No Transportation Needs (4/23/2024)    PRAPARE - Transportation     Lack of Transportation (Medical): No     Lack of Transportation (Non-Medical): No   Physical Activity: Sufficiently Active (4/23/2024)    Exercise Vital Sign     Days of Exercise per Week: 3 days     Minutes of Exercise per Session: 90 min   Stress: No Stress Concern Present (4/23/2024)    [employer] [place] - Occupational Stress Questionnaire     Feeling of Stress : Only a little   Housing Stability: Unknown (4/23/2024)    Housing Stability Vital Sign     Unable to Pay for Housing in the Last Year: No       Precautions:     Allergies as of 04/24/2024 - Reviewed 04/24/2024   Allergen Reaction Noted    Codeine Other (See Comments) 09/27/2018    Hydrocodone bitartrate   02/07/2024    Hydrocodone-acetaminophen Other (See Comments) 02/07/2024    Tramadol hcl  02/07/2024       WO Assessment Details/Treatment     Patient seen for wound care consultation.   Reviewed chart for this encounter.   See Flow Sheet for findings.      RECOMMENDATIONS: Patient is Aox4 and agreeable to inpatient ostomy care. MD consult for ileostomy. Patient is sitting up in chair and agreeable to first lesson and pouch change. Output in pouch. Pouch gently removed with adhesive spray remover. Cavilon non sting skin prep applied around stoma. Coloplast flat pouch cut to 35mm and applied. Good seal achieved with no leakage. Pouch clean, dry, and intact.     During pouch change. Surgical pouch flange on top of midline surgical incision accidentally removed dermabond. Serosanguineous fluid leakage at surgical incision site. Primary RN notified. MD to bedside to assess. MD placed steri strips and gauze. Primary RN to monitor.     No other issues or concerns at this time. Will continue to follow daily for educational lessons and pouching needs. Supplies at bedside. Will follow up tomorrow 5/11/2024 for next lesson.    Discussed POC with patient and primary nurse.   See EMR for orders & patient education.    Discussed nutrition and the role of protein in wound healing with the patient. Instructed patient to optimize protein for wound healing.    Bedside nursing to continue care & monitoring.  Bedside nursing to maintain pressure injury prevention interventions.            05/10/24 1045   WOCN Assessment   WOCN Total Time (mins) 60   Visit Date 05/10/24   Visit Time 1040   Consult Type New   WOCN Speciality Ostomy   Intervention assessed;changed;applied;chart review;coordination of care;orders   Teaching on-going        Colostomy 05/09/24 1345 RLQ   Placement Date/Time: 05/09/24 1345   Present Prior to Hospital Arrival?: No  Inserted by: MD  Location: RLQ   Stomal Appliance 1 piece;Clean;Dry;Intact;Changed;No  Leakage;Per protocol/policy   Stoma Appearance round;rosebud appearance   Site Assessment Clean;Intact   Peristomal Assessment Clean;Intact   Accessories/Skin Care cleansed w/ soap and water   Stoma Function flatus;stool   Treatment Bag change;Site care       Orders placed.   Neo Santiago BSN, RN  05/10/2024

## 2024-05-11 LAB
ANION GAP SERPL CALC-SCNC: 6 MMOL/L (ref 8–16)
ANION GAP SERPL CALC-SCNC: 7 MMOL/L (ref 8–16)
ANION GAP SERPL CALC-SCNC: 8 MMOL/L (ref 8–16)
BASOPHILS # BLD AUTO: 0.03 K/UL (ref 0–0.2)
BASOPHILS # BLD AUTO: 0.04 K/UL (ref 0–0.2)
BASOPHILS # BLD AUTO: 0.05 K/UL (ref 0–0.2)
BASOPHILS NFR BLD: 0.5 % (ref 0–1.9)
BASOPHILS NFR BLD: 0.6 % (ref 0–1.9)
BASOPHILS NFR BLD: 0.7 % (ref 0–1.9)
BODY FLUID SOURCE, CREATININE: NORMAL
BUN SERPL-MCNC: 7 MG/DL (ref 6–20)
CALCIUM SERPL-MCNC: 7.9 MG/DL (ref 8.7–10.5)
CALCIUM SERPL-MCNC: 8.1 MG/DL (ref 8.7–10.5)
CALCIUM SERPL-MCNC: 8.5 MG/DL (ref 8.7–10.5)
CHLORIDE SERPL-SCNC: 111 MMOL/L (ref 95–110)
CHLORIDE SERPL-SCNC: 112 MMOL/L (ref 95–110)
CHLORIDE SERPL-SCNC: 114 MMOL/L (ref 95–110)
CO2 SERPL-SCNC: 21 MMOL/L (ref 23–29)
CO2 SERPL-SCNC: 22 MMOL/L (ref 23–29)
CO2 SERPL-SCNC: 23 MMOL/L (ref 23–29)
CREAT FLD-MCNC: 0.9 MG/DL
CREAT SERPL-MCNC: 0.9 MG/DL (ref 0.5–1.4)
DIFFERENTIAL METHOD BLD: ABNORMAL
EOSINOPHIL # BLD AUTO: 0.1 K/UL (ref 0–0.5)
EOSINOPHIL # BLD AUTO: 0.2 K/UL (ref 0–0.5)
EOSINOPHIL # BLD AUTO: 0.2 K/UL (ref 0–0.5)
EOSINOPHIL NFR BLD: 2.5 % (ref 0–8)
EOSINOPHIL NFR BLD: 2.5 % (ref 0–8)
EOSINOPHIL NFR BLD: 2.9 % (ref 0–8)
ERYTHROCYTE [DISTWIDTH] IN BLOOD BY AUTOMATED COUNT: 13.6 % (ref 11.5–14.5)
ERYTHROCYTE [DISTWIDTH] IN BLOOD BY AUTOMATED COUNT: 13.6 % (ref 11.5–14.5)
ERYTHROCYTE [DISTWIDTH] IN BLOOD BY AUTOMATED COUNT: 13.8 % (ref 11.5–14.5)
EST. GFR  (NO RACE VARIABLE): >60 ML/MIN/1.73 M^2
GLUCOSE SERPL-MCNC: 110 MG/DL (ref 70–110)
GLUCOSE SERPL-MCNC: 125 MG/DL (ref 70–110)
GLUCOSE SERPL-MCNC: 158 MG/DL (ref 70–110)
HCT VFR BLD AUTO: 27.6 % (ref 37–48.5)
HCT VFR BLD AUTO: 28.2 % (ref 37–48.5)
HCT VFR BLD AUTO: 31.2 % (ref 37–48.5)
HGB BLD-MCNC: 10.1 G/DL (ref 12–16)
HGB BLD-MCNC: 9 G/DL (ref 12–16)
HGB BLD-MCNC: 9.3 G/DL (ref 12–16)
IMM GRANULOCYTES # BLD AUTO: 0.01 K/UL (ref 0–0.04)
IMM GRANULOCYTES # BLD AUTO: 0.01 K/UL (ref 0–0.04)
IMM GRANULOCYTES # BLD AUTO: 0.03 K/UL (ref 0–0.04)
IMM GRANULOCYTES NFR BLD AUTO: 0.2 % (ref 0–0.5)
IMM GRANULOCYTES NFR BLD AUTO: 0.2 % (ref 0–0.5)
IMM GRANULOCYTES NFR BLD AUTO: 0.4 % (ref 0–0.5)
LYMPHOCYTES # BLD AUTO: 0.8 K/UL (ref 1–4.8)
LYMPHOCYTES # BLD AUTO: 0.9 K/UL (ref 1–4.8)
LYMPHOCYTES # BLD AUTO: 1 K/UL (ref 1–4.8)
LYMPHOCYTES NFR BLD: 13.4 % (ref 18–48)
LYMPHOCYTES NFR BLD: 13.8 % (ref 18–48)
LYMPHOCYTES NFR BLD: 14 % (ref 18–48)
MCH RBC QN AUTO: 32.5 PG (ref 27–31)
MCH RBC QN AUTO: 32.8 PG (ref 27–31)
MCH RBC QN AUTO: 33.3 PG (ref 27–31)
MCHC RBC AUTO-ENTMCNC: 31.9 G/DL (ref 32–36)
MCHC RBC AUTO-ENTMCNC: 32.4 G/DL (ref 32–36)
MCHC RBC AUTO-ENTMCNC: 33.7 G/DL (ref 32–36)
MCV RBC AUTO: 100 FL (ref 82–98)
MCV RBC AUTO: 103 FL (ref 82–98)
MCV RBC AUTO: 99 FL (ref 82–98)
MONOCYTES # BLD AUTO: 0.4 K/UL (ref 0.3–1)
MONOCYTES # BLD AUTO: 0.6 K/UL (ref 0.3–1)
MONOCYTES # BLD AUTO: 0.6 K/UL (ref 0.3–1)
MONOCYTES NFR BLD: 7.5 % (ref 4–15)
MONOCYTES NFR BLD: 8.1 % (ref 4–15)
MONOCYTES NFR BLD: 8.8 % (ref 4–15)
NEUTROPHILS # BLD AUTO: 4.2 K/UL (ref 1.8–7.7)
NEUTROPHILS # BLD AUTO: 4.8 K/UL (ref 1.8–7.7)
NEUTROPHILS # BLD AUTO: 5.4 K/UL (ref 1.8–7.7)
NEUTROPHILS NFR BLD: 74.1 % (ref 38–73)
NEUTROPHILS NFR BLD: 74.5 % (ref 38–73)
NEUTROPHILS NFR BLD: 75.3 % (ref 38–73)
NRBC BLD-RTO: 0 /100 WBC
PLATELET # BLD AUTO: 226 K/UL (ref 150–450)
PLATELET # BLD AUTO: 237 K/UL (ref 150–450)
PLATELET # BLD AUTO: 279 K/UL (ref 150–450)
PMV BLD AUTO: 10 FL (ref 9.2–12.9)
PMV BLD AUTO: 10 FL (ref 9.2–12.9)
PMV BLD AUTO: 9.9 FL (ref 9.2–12.9)
POTASSIUM SERPL-SCNC: 3.7 MMOL/L (ref 3.5–5.1)
POTASSIUM SERPL-SCNC: 3.8 MMOL/L (ref 3.5–5.1)
POTASSIUM SERPL-SCNC: 3.9 MMOL/L (ref 3.5–5.1)
RBC # BLD AUTO: 2.74 M/UL (ref 4–5.4)
RBC # BLD AUTO: 2.79 M/UL (ref 4–5.4)
RBC # BLD AUTO: 3.11 M/UL (ref 4–5.4)
SODIUM SERPL-SCNC: 141 MMOL/L (ref 136–145)
SODIUM SERPL-SCNC: 141 MMOL/L (ref 136–145)
SODIUM SERPL-SCNC: 142 MMOL/L (ref 136–145)
WBC # BLD AUTO: 5.59 K/UL (ref 3.9–12.7)
WBC # BLD AUTO: 6.47 K/UL (ref 3.9–12.7)
WBC # BLD AUTO: 7.25 K/UL (ref 3.9–12.7)

## 2024-05-11 PROCEDURE — 25000003 PHARM REV CODE 250: Performed by: SURGERY

## 2024-05-11 PROCEDURE — 82570 ASSAY OF URINE CREATININE: CPT | Performed by: SURGERY

## 2024-05-11 PROCEDURE — 36415 COLL VENOUS BLD VENIPUNCTURE: CPT | Performed by: SURGERY

## 2024-05-11 PROCEDURE — 36415 COLL VENOUS BLD VENIPUNCTURE: CPT | Performed by: STUDENT IN AN ORGANIZED HEALTH CARE EDUCATION/TRAINING PROGRAM

## 2024-05-11 PROCEDURE — 25000003 PHARM REV CODE 250: Performed by: STUDENT IN AN ORGANIZED HEALTH CARE EDUCATION/TRAINING PROGRAM

## 2024-05-11 PROCEDURE — 85025 COMPLETE CBC W/AUTO DIFF WBC: CPT | Mod: 91 | Performed by: SURGERY

## 2024-05-11 PROCEDURE — 85025 COMPLETE CBC W/AUTO DIFF WBC: CPT | Performed by: STUDENT IN AN ORGANIZED HEALTH CARE EDUCATION/TRAINING PROGRAM

## 2024-05-11 PROCEDURE — 63600175 PHARM REV CODE 636 W HCPCS: Performed by: STUDENT IN AN ORGANIZED HEALTH CARE EDUCATION/TRAINING PROGRAM

## 2024-05-11 PROCEDURE — 80048 BASIC METABOLIC PNL TOTAL CA: CPT | Performed by: SURGERY

## 2024-05-11 PROCEDURE — 20600001 HC STEP DOWN PRIVATE ROOM

## 2024-05-11 PROCEDURE — 25000003 PHARM REV CODE 250: Performed by: NURSE PRACTITIONER

## 2024-05-11 RX ORDER — LOPERAMIDE HYDROCHLORIDE 2 MG/1
2 CAPSULE ORAL 4 TIMES DAILY
Status: DISCONTINUED | OUTPATIENT
Start: 2024-05-11 | End: 2024-05-12

## 2024-05-11 RX ORDER — LOPERAMIDE HYDROCHLORIDE 2 MG/1
2 CAPSULE ORAL 2 TIMES DAILY
Status: DISCONTINUED | OUTPATIENT
Start: 2024-05-11 | End: 2024-05-11

## 2024-05-11 RX ADMIN — GABAPENTIN 300 MG: 300 CAPSULE ORAL at 02:05

## 2024-05-11 RX ADMIN — SODIUM CHLORIDE 1000 ML: 9 INJECTION, SOLUTION INTRAVENOUS at 06:05

## 2024-05-11 RX ADMIN — KETOROLAC TROMETHAMINE 15 MG: 15 INJECTION, SOLUTION INTRAMUSCULAR; INTRAVENOUS at 06:05

## 2024-05-11 RX ADMIN — MUPIROCIN: 20 OINTMENT TOPICAL at 09:05

## 2024-05-11 RX ADMIN — PIPERACILLIN SODIUM AND TAZOBACTAM SODIUM 4.5 G: 4; .5 INJECTION, POWDER, FOR SOLUTION INTRAVENOUS at 04:05

## 2024-05-11 RX ADMIN — ACETAMINOPHEN 1000 MG: 500 TABLET ORAL at 02:05

## 2024-05-11 RX ADMIN — PIPERACILLIN SODIUM AND TAZOBACTAM SODIUM 4.5 G: 4; .5 INJECTION, POWDER, FOR SOLUTION INTRAVENOUS at 09:05

## 2024-05-11 RX ADMIN — GABAPENTIN 300 MG: 300 CAPSULE ORAL at 09:05

## 2024-05-11 RX ADMIN — SODIUM CHLORIDE 500 ML: 9 INJECTION, SOLUTION INTRAVENOUS at 02:05

## 2024-05-11 RX ADMIN — LOPERAMIDE HYDROCHLORIDE 2 MG: 2 CAPSULE ORAL at 09:05

## 2024-05-11 RX ADMIN — KETOROLAC TROMETHAMINE 15 MG: 15 INJECTION, SOLUTION INTRAMUSCULAR; INTRAVENOUS at 12:05

## 2024-05-11 RX ADMIN — PIPERACILLIN SODIUM AND TAZOBACTAM SODIUM 4.5 G: 4; .5 INJECTION, POWDER, FOR SOLUTION INTRAVENOUS at 12:05

## 2024-05-11 RX ADMIN — LOPERAMIDE HYDROCHLORIDE 2 MG: 2 CAPSULE ORAL at 04:05

## 2024-05-11 RX ADMIN — ACETAMINOPHEN 1000 MG: 500 TABLET ORAL at 09:05

## 2024-05-11 NOTE — PLAN OF CARE
[facility] Plan of Care Note  Dx Malignant Neoplasm of Sigmoid Colon     Shift Events: Patient very pleasant and involved in care. A/Ox4 on RA. Pt endorses some discomfort and soreness from surgical incisions that is well managed with scheduled pain regimen. Pt hypotensive overnight 80s/50s. She is asymptomatic. On call resident notified. No new orders. Continues to monitor patient for duration of shift.     Goals of Care: Pain mgmt. Increased mobility. Ostomy education     Neuro: A/Ox4     Vital Signs: BP 80s/50s. Other VSS     Respiratory: RA     Diet: low fiber     Is patient tolerating current diet? Yes     GTTS: N/A     Urine Output/Bowel Movement: Adequate urine output. Liquid stool emptied from ostomy     Drains/Tubes/Tube Feeds (include total output/shift): MANDEEP drain 90 ml bloody output      Lines: PIV x2        Accuchecks:N/A     Skin: 3 lap sites. Mid-abdominal/transverse incision closed with dermabond     Fall Risk Score: 11     Activity level? Assist x1     Any scheduled procedures? N/A     Any safety concerns? N/A     Other: N/A      Problem: Adult Inpatient Plan of Care  Goal: Plan of Care Review  Outcome: Progressing  Goal: Absence of Hospital-Acquired Illness or Injury  Outcome: Progressing  Goal: Optimal Comfort and Wellbeing  Outcome: Progressing  Goal: Readiness for Transition of Care  Outcome: Progressing     Problem: Infection  Goal: Absence of Infection Signs and Symptoms  Outcome: Progressing     Problem: Wound  Goal: Absence of Infection Signs and Symptoms  Outcome: Progressing  Goal: Improved Oral Intake  Outcome: Progressing  Goal: Optimal Pain Control and Function  Outcome: Progressing  Goal: Skin Health and Integrity  Outcome: Progressing  Goal: Optimal Wound Healing  Outcome: Progressing     Problem: Fall Injury Risk  Goal: Absence of Fall and Fall-Related Injury  Outcome: Progressing

## 2024-05-11 NOTE — NURSING
Pt has been having episodes of hypotension without symptoms. She was asking about getting into the chair so RN performed orthostatic vital signs. Dr MARK Oh and Dr NED Reyes notified of these results:     Left arm:  Lying 93/54 (69) HR 91  Sitting 99/55 (MAP 73) HR 99  Standing 92/55 (MAP 68)     Pt remained asymptomatic throughout and was placed in chair.  bedside.

## 2024-05-11 NOTE — PROGRESS NOTES
[facility] - [facility]  Colorectal Surgery  Progress Note    Patient Name: Collette Castro  MRN: [id number]  Admission Date: 5/9/2024  Hospital Length of Stay: 2 days  Attending Physician: SANTIAGO Paris MD    Subjective:     Interval History: Hypotensive to systolic 70s overnight with only symptom being dizzy on ambulation. Received 1L bolus this morning. Ileostomy with 3.8L out and MANDEEP drain with 530 of thin output. This morning she states that she feel fine and pain better controlled.    Post-Op Info:  Procedure(s) (LRB):  XI ROBOTIC COLECTOMY, SIGMOID (N/A)  CYSTOSCOPY, WITH URETERAL STENT INSERTION (Bilateral)  SIGMOIDOSCOPY, FLEXIBLE (N/A)  MOBILIZATION, SPLENIC FLEXURE, LAPAROSCOPIC  LYSIS, ADHESIONS  RESECTION, COLON, LOW ANTERIOR, robotic converted to open (N/A)   2 Days Post-Op      Medications:  Continuous Infusions:  Scheduled Meds:   acetaminophen  1,000 mg Oral Q8H    gabapentin  300 mg Oral TID    ketorolac  15 mg Intravenous Q6H    mupirocin   Nasal BID    piperacillin-tazobactam (Zosyn) IV (PEDS and ADULTS) (extended infusion is not appropriate)  4.5 g Intravenous Q8H     PRN Meds:   acetaminophen tablet 1,000 mg    gabapentin capsule 300 mg    ketorolac injection 15 mg    mupirocin 2 % ointment    piperacillin-tazobactam (ZOSYN) 4.5 g in dextrose 5 % in water (D5W) 100 mL IVPB (MB+)        Objective:     Vital Signs (Most Recent):  Temp: 98.2 °F (36.8 °C) (05/11/24 0717)  Pulse: 83 (05/11/24 0756)  Resp: 20 (05/11/24 0717)  BP: (!) 77/52 (05/11/24 0717)  SpO2: 96 % (05/11/24 0717) Vital Signs (24h Range):  Temp:  [98.1 °F (36.7 °C)-98.6 °F (37 °C)] 98.2 °F (36.8 °C)  Pulse:  [] 83  Resp:  [18-20] 20  SpO2:  [93 %-98 %] 96 %  BP: (77-92)/(50-60) 77/52     Intake/Output - Last 3 Shifts         05/09 0700  05/10 0659 05/10 0700  05/11 0659 05/11 0700  05/12 0659    P.O.  750     I.V. (mL/kg)  1137.8 (16.2)     IV Piggyback 4314.3 1656.3     Total Intake(mL/kg) 4314.3 (61.4) 3544.1 (50.4)     Urine  (mL/kg/hr) 1455 (0.9) 1100 (0.7)     Drains 550 530     Stool 250 3875     Total Output 2255 5505     Net +2059.3 -1960.9            Stool Occurrence 2 x 1 x              Physical Exam  Constitutional:       General: She is not in acute distress.     Appearance: She is not ill-appearing.   HENT:      Head: Normocephalic and atraumatic.      Nose: Nose normal.      Mouth/Throat:      Mouth: Mucous membranes are moist.      Pharynx: Oropharynx is clear.   Eyes:      Extraocular Movements: Extraocular movements intact.      Conjunctiva/sclera: Conjunctivae normal.   Cardiovascular:      Rate and Rhythm: Normal rate and regular rhythm.      Pulses: Normal pulses.   Pulmonary:      Effort: Pulmonary effort is normal.   Abdominal:      General: Abdomen is flat.      Palpations: Abdomen is soft.      Tenderness: There is abdominal tenderness (appropriately TTP). There is no guarding or rebound.      Comments: Lower midline, transverse, and lap incisions cdi  Ileostomy pink and patent with thin bilious output in bag   Musculoskeletal:         General: Normal range of motion.      Cervical back: Normal range of motion.   Skin:     General: Skin is warm and dry.      Capillary Refill: Capillary refill takes less than 2 seconds.   Neurological:      Mental Status: She is alert and oriented to person, place, and time.          Significant Labs:  BMP (Last 3 Results):   Recent Labs   Lab 05/07/24  1622 05/10/24  0531 05/11/24  0548   GLU 72 102 110    139 141   K 4.1 3.9 3.8    111* 111*   CO2 24 20* 23   BUN 12 5* 7   CREATININE 0.8 0.9 0.9   CALCIUM 9.4 8.2* 8.1*   MG  --  1.9  --      CBC (Last 3 Results):   Recent Labs   Lab 05/07/24  1622 05/10/24  0531 05/10/24  1122 05/11/24  0457   WBC 5.79 5.89  --  6.47   RBC 3.76* 3.55*  --  2.79*   HGB 12.2 11.2* 11.2* 9.3*   HCT 36.6* 35.0*  --  27.6*    269  --  237   MCV 97 99*  --  99*   MCH 32.4* 31.5*  --  33.3*   MCHC 33.3 32.0  --  33.7       Significant  Diagnostics:  I have reviewed all pertinent imaging results/findings within the past 24 hours.  Assessment/Plan:     * Malignant neoplasm of sigmoid colon  Collette Castro is a 49 y.o. year old female with history of sigmoid cancer now s/p robotic converted to open sigmoidectomy with diverting loop ileostomy 5/9     Plan:  Feels well this morning.   Hypotensive overnight. Given 1L of cyrstalloid. Drain high output but SS in nature. HR in 80s. Patient now with orthostatic hypotensive symptoms. Hgb 9.2 this AM, but could be response to boluses over past 48 hours.  Drain Cr sent to assess drain output  MMPR- continue, unable to take oral narcotics. Toradol   LRD  Ileostomy with 3.8L thin output, ostomy nurse consulted; will discuss starting imodium with Dr. Paris  Vte ppx: SCD, hold lovenox until hgb stable  Ambulate  Continue chamorro for the morning to continue monitoring UOP and until MANDEEP drain output characterized          Vishal Reyes MD  Colorectal Surgery  [facility]

## 2024-05-11 NOTE — SUBJECTIVE & OBJECTIVE
Subjective:     Interval History: Hypotensive to systolic 70s overnight with only symptom being dizzy on ambulation. Received 1L bolus this morning. Ileostomy with 3.8L out and MANDEEP drain with 530 of thin output. This morning she states that she feel fine and pain better controlled.    Post-Op Info:  Procedure(s) (LRB):  XI ROBOTIC COLECTOMY, SIGMOID (N/A)  CYSTOSCOPY, WITH URETERAL STENT INSERTION (Bilateral)  SIGMOIDOSCOPY, FLEXIBLE (N/A)  MOBILIZATION, SPLENIC FLEXURE, LAPAROSCOPIC  LYSIS, ADHESIONS  RESECTION, COLON, LOW ANTERIOR, robotic converted to open (N/A)   2 Days Post-Op      Medications:  Continuous Infusions:  Scheduled Meds:   acetaminophen  1,000 mg Oral Q8H    gabapentin  300 mg Oral TID    ketorolac  15 mg Intravenous Q6H    mupirocin   Nasal BID    piperacillin-tazobactam (Zosyn) IV (PEDS and ADULTS) (extended infusion is not appropriate)  4.5 g Intravenous Q8H     PRN Meds:   acetaminophen tablet 1,000 mg    gabapentin capsule 300 mg    ketorolac injection 15 mg    mupirocin 2 % ointment    piperacillin-tazobactam (ZOSYN) 4.5 g in dextrose 5 % in water (D5W) 100 mL IVPB (MB+)        Objective:     Vital Signs (Most Recent):  Temp: 98.2 °F (36.8 °C) (05/11/24 0717)  Pulse: 83 (05/11/24 0756)  Resp: 20 (05/11/24 0717)  BP: (!) 77/52 (05/11/24 0717)  SpO2: 96 % (05/11/24 0717) Vital Signs (24h Range):  Temp:  [98.1 °F (36.7 °C)-98.6 °F (37 °C)] 98.2 °F (36.8 °C)  Pulse:  [] 83  Resp:  [18-20] 20  SpO2:  [93 %-98 %] 96 %  BP: (77-92)/(50-60) 77/52     Intake/Output - Last 3 Shifts         05/09 0700  05/10 0659 05/10 0700  05/11 0659 05/11 0700  05/12 0659    P.O.  750     I.V. (mL/kg)  1137.8 (16.2)     IV Piggyback 4314.3 1656.3     Total Intake(mL/kg) 4314.3 (61.4) 3544.1 (50.4)     Urine (mL/kg/hr) 1455 (0.9) 1100 (0.7)     Drains 550 530     Stool 250 3875     Total Output 2255 5505     Net +2059.3 -1960.9            Stool Occurrence 2 x 1 x              Physical Exam  Constitutional:        General: She is not in acute distress.     Appearance: She is not ill-appearing.   HENT:      Head: Normocephalic and atraumatic.      Nose: Nose normal.      Mouth/Throat:      Mouth: Mucous membranes are moist.      Pharynx: Oropharynx is clear.   Eyes:      Extraocular Movements: Extraocular movements intact.      Conjunctiva/sclera: Conjunctivae normal.   Cardiovascular:      Rate and Rhythm: Normal rate and regular rhythm.      Pulses: Normal pulses.   Pulmonary:      Effort: Pulmonary effort is normal.   Abdominal:      General: Abdomen is flat.      Palpations: Abdomen is soft.      Tenderness: There is abdominal tenderness (appropriately TTP). There is no guarding or rebound.      Comments: Lower midline, transverse, and lap incisions cdi  Ileostomy pink and patent with thin bilious output in bag   Musculoskeletal:         General: Normal range of motion.      Cervical back: Normal range of motion.   Skin:     General: Skin is warm and dry.      Capillary Refill: Capillary refill takes less than 2 seconds.   Neurological:      Mental Status: She is alert and oriented to person, place, and time.          Significant Labs:  BMP (Last 3 Results):   Recent Labs   Lab 05/07/24  1622 05/10/24  0531 05/11/24  0548   GLU 72 102 110    139 141   K 4.1 3.9 3.8    111* 111*   CO2 24 20* 23   BUN 12 5* 7   CREATININE 0.8 0.9 0.9   CALCIUM 9.4 8.2* 8.1*   MG  --  1.9  --      CBC (Last 3 Results):   Recent Labs   Lab 05/07/24  1622 05/10/24  0531 05/10/24  1122 05/11/24  0457   WBC 5.79 5.89  --  6.47   RBC 3.76* 3.55*  --  2.79*   HGB 12.2 11.2* 11.2* 9.3*   HCT 36.6* 35.0*  --  27.6*    269  --  237   MCV 97 99*  --  99*   MCH 32.4* 31.5*  --  33.3*   MCHC 33.3 32.0  --  33.7       Significant Diagnostics:  I have reviewed all pertinent imaging results/findings within the past 24 hours.

## 2024-05-11 NOTE — PROGRESS NOTES
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 Analyses with [employer]Expanded (77 genes)    Pre-diabetes     Sphincter of Oddi dysfunction        Social History     Socioeconomic History    Marital status:    Tobacco Use    Smoking status: Never    Smokeless tobacco: Never   Substance and Sexual Activity    Alcohol use: Not Currently     Comment: once a month    Drug use: Never    Sexual activity: Yes     Partners: Male     Birth control/protection: See Surgical Hx     Social Determinants of Health     Financial Resource Strain: Low Risk  (5/10/2024)    Overall Financial Resource Strain (CARDIA)     Difficulty of Paying Living Expenses: Not hard at all   Food Insecurity: No Food Insecurity (5/10/2024)    Hunger Vital Sign     Worried About Running Out of Food in the Last Year: Never true     Ran Out of Food in the Last Year: Never true   Transportation Needs: No Transportation Needs (5/10/2024)    TRANSPORTATION NEEDS     Transportation : No   Physical Activity: Sufficiently Active (4/23/2024)    Exercise Vital Sign     Days of Exercise per Week: 3 days     Minutes of Exercise per Session: 90 min   Stress: No Stress Concern Present (5/10/2024)    [employer] - Occupational Stress Questionnaire     Feeling of Stress : Only a little       Precautions:     Allergies as of 04/24/2024 - Reviewed 04/24/2024   Allergen Reaction Noted    Codeine Other (See Comments) 09/27/2018    Hydrocodone bitartrate  02/07/2024    Hydrocodone-acetaminophen Other (See Comments) 02/07/2024    Tramadol hcl  02/07/2024       WO Assessment Details/Treatment     Patient seen for wound care consultation.   Reviewed  chart for this encounter.   See Flow Sheet for findings.      RECOMMENDATIONS: Patient is Aox4 and agreeable to inpatient ostomy care. Ostomy site and pouch are clean, dry, intact with no leakage and a good seal. Second ostomy lesson completed. Supplies left at bedside. No other issues or concerns at this time. Will continue to follow during inpatient stay for ostomy and pouching needs.    Discussed POC with patient and primary nurse.   See EMR for orders & patient education.    Discussed nutrition and the role of protein in wound healing with the patient. Instructed patient to optimize protein for wound healing.    Bedside nursing to continue care & monitoring.  Bedside nursing to maintain pressure injury prevention interventions.            05/11/24 1100   WOCN Assessment   WOCN Total Time (mins) 45   Visit Date 05/11/24   Visit Time 1100   Consult Type Follow Up   WOCN Speciality Ostomy   Intervention assessed;changed;applied;chart review;coordination of care;orders   Teaching on-going        Colostomy 05/09/24 1345 RLQ   Placement Date/Time: 05/09/24 1345   Present Prior to Hospital Arrival?: No  Inserted by: MD  Location: RLQ   Stomal Appliance 1 piece;Clean;Intact;Dry;No Leakage   Stoma Appearance round;rosebud appearance   Site Assessment Clean;Intact     Orders placed.   Neo HIGGINBOTHAMN, RN  05/11/2024

## 2024-05-11 NOTE — ASSESSMENT & PLAN NOTE
Collette Castro is a 49 y.o. year old female with history of sigmoid cancer now s/p robotic converted to open sigmoidectomy with diverting loop ileostomy 5/9     Plan:  Feels well this morning.   Hypotensive overnight. Given 1L of cyrstalloid. Drain high output but SS in nature. HR in 80s. Patient now with orthostatic hypotensive symptoms. Hgb 9.2 this AM, but could be response to boluses over past 48 hours.  Drain Cr sent to assess drain output  MMPR- continue, unable to take oral narcotics. Toradol   LRD  Ileostomy with 3.8L thin output, ostomy nurse consulted; will discuss starting imodium with Dr. Paris  Vte ppx: SCD, hold lovenox until hgb stable  Ambulate  Continue chamorro for the morning to continue monitoring UOP and until MANDEEP drain output characterized

## 2024-05-12 LAB
ANION GAP SERPL CALC-SCNC: 7 MMOL/L (ref 8–16)
ANION GAP SERPL CALC-SCNC: 8 MMOL/L (ref 8–16)
BASOPHILS # BLD AUTO: 0.04 K/UL (ref 0–0.2)
BASOPHILS # BLD AUTO: 0.04 K/UL (ref 0–0.2)
BASOPHILS NFR BLD: 0.6 % (ref 0–1.9)
BASOPHILS NFR BLD: 0.6 % (ref 0–1.9)
BUN SERPL-MCNC: 6 MG/DL (ref 6–20)
BUN SERPL-MCNC: 9 MG/DL (ref 6–20)
CALCIUM SERPL-MCNC: 8.5 MG/DL (ref 8.7–10.5)
CALCIUM SERPL-MCNC: 9.1 MG/DL (ref 8.7–10.5)
CHLORIDE SERPL-SCNC: 111 MMOL/L (ref 95–110)
CHLORIDE SERPL-SCNC: 111 MMOL/L (ref 95–110)
CO2 SERPL-SCNC: 21 MMOL/L (ref 23–29)
CO2 SERPL-SCNC: 22 MMOL/L (ref 23–29)
CREAT SERPL-MCNC: 0.8 MG/DL (ref 0.5–1.4)
CREAT SERPL-MCNC: 0.8 MG/DL (ref 0.5–1.4)
CRP SERPL-MCNC: 55.1 MG/L (ref 0–8.2)
DIFFERENTIAL METHOD BLD: ABNORMAL
DIFFERENTIAL METHOD BLD: ABNORMAL
EOSINOPHIL # BLD AUTO: 0.3 K/UL (ref 0–0.5)
EOSINOPHIL # BLD AUTO: 0.3 K/UL (ref 0–0.5)
EOSINOPHIL NFR BLD: 4.3 % (ref 0–8)
EOSINOPHIL NFR BLD: 4.9 % (ref 0–8)
ERYTHROCYTE [DISTWIDTH] IN BLOOD BY AUTOMATED COUNT: 13.2 % (ref 11.5–14.5)
ERYTHROCYTE [DISTWIDTH] IN BLOOD BY AUTOMATED COUNT: 13.4 % (ref 11.5–14.5)
EST. GFR  (NO RACE VARIABLE): >60 ML/MIN/1.73 M^2
EST. GFR  (NO RACE VARIABLE): >60 ML/MIN/1.73 M^2
GLUCOSE SERPL-MCNC: 109 MG/DL (ref 70–110)
GLUCOSE SERPL-MCNC: 115 MG/DL (ref 70–110)
HCT VFR BLD AUTO: 30.6 % (ref 37–48.5)
HCT VFR BLD AUTO: 33.8 % (ref 37–48.5)
HGB BLD-MCNC: 10.7 G/DL (ref 12–16)
HGB BLD-MCNC: 9.6 G/DL (ref 12–16)
IMM GRANULOCYTES # BLD AUTO: 0.02 K/UL (ref 0–0.04)
IMM GRANULOCYTES # BLD AUTO: 0.03 K/UL (ref 0–0.04)
IMM GRANULOCYTES NFR BLD AUTO: 0.3 % (ref 0–0.5)
IMM GRANULOCYTES NFR BLD AUTO: 0.4 % (ref 0–0.5)
LYMPHOCYTES # BLD AUTO: 0.8 K/UL (ref 1–4.8)
LYMPHOCYTES # BLD AUTO: 0.9 K/UL (ref 1–4.8)
LYMPHOCYTES NFR BLD: 11.6 % (ref 18–48)
LYMPHOCYTES NFR BLD: 12.5 % (ref 18–48)
MAGNESIUM SERPL-MCNC: 1.5 MG/DL (ref 1.6–2.6)
MAGNESIUM SERPL-MCNC: 1.6 MG/DL (ref 1.6–2.6)
MCH RBC QN AUTO: 32.2 PG (ref 27–31)
MCH RBC QN AUTO: 32.3 PG (ref 27–31)
MCHC RBC AUTO-ENTMCNC: 31.4 G/DL (ref 32–36)
MCHC RBC AUTO-ENTMCNC: 31.7 G/DL (ref 32–36)
MCV RBC AUTO: 102 FL (ref 82–98)
MCV RBC AUTO: 103 FL (ref 82–98)
MONOCYTES # BLD AUTO: 0.5 K/UL (ref 0.3–1)
MONOCYTES # BLD AUTO: 0.6 K/UL (ref 0.3–1)
MONOCYTES NFR BLD: 7.4 % (ref 4–15)
MONOCYTES NFR BLD: 8.4 % (ref 4–15)
NEUTROPHILS # BLD AUTO: 5 K/UL (ref 1.8–7.7)
NEUTROPHILS # BLD AUTO: 5.3 K/UL (ref 1.8–7.7)
NEUTROPHILS NFR BLD: 73.2 % (ref 38–73)
NEUTROPHILS NFR BLD: 75.8 % (ref 38–73)
NRBC BLD-RTO: 0 /100 WBC
NRBC BLD-RTO: 0 /100 WBC
PHOSPHATE SERPL-MCNC: 3 MG/DL (ref 2.7–4.5)
PHOSPHATE SERPL-MCNC: 3.5 MG/DL (ref 2.7–4.5)
PLATELET # BLD AUTO: 273 K/UL (ref 150–450)
PLATELET # BLD AUTO: 317 K/UL (ref 150–450)
PMV BLD AUTO: 10 FL (ref 9.2–12.9)
PMV BLD AUTO: 10.8 FL (ref 9.2–12.9)
POTASSIUM SERPL-SCNC: 4.1 MMOL/L (ref 3.5–5.1)
POTASSIUM SERPL-SCNC: 4.5 MMOL/L (ref 3.5–5.1)
RBC # BLD AUTO: 2.98 M/UL (ref 4–5.4)
RBC # BLD AUTO: 3.31 M/UL (ref 4–5.4)
SODIUM SERPL-SCNC: 140 MMOL/L (ref 136–145)
SODIUM SERPL-SCNC: 140 MMOL/L (ref 136–145)
WBC # BLD AUTO: 6.78 K/UL (ref 3.9–12.7)
WBC # BLD AUTO: 6.99 K/UL (ref 3.9–12.7)

## 2024-05-12 PROCEDURE — 36415 COLL VENOUS BLD VENIPUNCTURE: CPT | Performed by: SURGERY

## 2024-05-12 PROCEDURE — 84100 ASSAY OF PHOSPHORUS: CPT | Mod: 91 | Performed by: SURGERY

## 2024-05-12 PROCEDURE — 63600175 PHARM REV CODE 636 W HCPCS: Performed by: STUDENT IN AN ORGANIZED HEALTH CARE EDUCATION/TRAINING PROGRAM

## 2024-05-12 PROCEDURE — 25000003 PHARM REV CODE 250: Performed by: NURSE PRACTITIONER

## 2024-05-12 PROCEDURE — 86140 C-REACTIVE PROTEIN: CPT | Performed by: STUDENT IN AN ORGANIZED HEALTH CARE EDUCATION/TRAINING PROGRAM

## 2024-05-12 PROCEDURE — 80048 BASIC METABOLIC PNL TOTAL CA: CPT | Performed by: SURGERY

## 2024-05-12 PROCEDURE — 20600001 HC STEP DOWN PRIVATE ROOM

## 2024-05-12 PROCEDURE — 25000003 PHARM REV CODE 250: Performed by: SURGERY

## 2024-05-12 PROCEDURE — 36415 COLL VENOUS BLD VENIPUNCTURE: CPT | Performed by: STUDENT IN AN ORGANIZED HEALTH CARE EDUCATION/TRAINING PROGRAM

## 2024-05-12 PROCEDURE — 25000003 PHARM REV CODE 250: Performed by: STUDENT IN AN ORGANIZED HEALTH CARE EDUCATION/TRAINING PROGRAM

## 2024-05-12 PROCEDURE — 85025 COMPLETE CBC W/AUTO DIFF WBC: CPT | Performed by: SURGERY

## 2024-05-12 PROCEDURE — 83735 ASSAY OF MAGNESIUM: CPT | Mod: 91 | Performed by: SURGERY

## 2024-05-12 RX ORDER — LOPERAMIDE HYDROCHLORIDE 2 MG/1
4 CAPSULE ORAL 4 TIMES DAILY
Status: DISCONTINUED | OUTPATIENT
Start: 2024-05-12 | End: 2024-05-13 | Stop reason: HOSPADM

## 2024-05-12 RX ADMIN — KETOROLAC TROMETHAMINE 15 MG: 15 INJECTION, SOLUTION INTRAMUSCULAR; INTRAVENOUS at 05:05

## 2024-05-12 RX ADMIN — ACETAMINOPHEN 1000 MG: 500 TABLET ORAL at 09:05

## 2024-05-12 RX ADMIN — LOPERAMIDE HYDROCHLORIDE 4 MG: 2 CAPSULE ORAL at 08:05

## 2024-05-12 RX ADMIN — KETOROLAC TROMETHAMINE 15 MG: 15 INJECTION, SOLUTION INTRAMUSCULAR; INTRAVENOUS at 12:05

## 2024-05-12 RX ADMIN — ACETAMINOPHEN 1000 MG: 500 TABLET ORAL at 03:05

## 2024-05-12 RX ADMIN — GABAPENTIN 300 MG: 300 CAPSULE ORAL at 09:05

## 2024-05-12 RX ADMIN — PIPERACILLIN SODIUM AND TAZOBACTAM SODIUM 4.5 G: 4; .5 INJECTION, POWDER, FOR SOLUTION INTRAVENOUS at 12:05

## 2024-05-12 RX ADMIN — PIPERACILLIN SODIUM AND TAZOBACTAM SODIUM 4.5 G: 4; .5 INJECTION, POWDER, FOR SOLUTION INTRAVENOUS at 08:05

## 2024-05-12 RX ADMIN — ACETAMINOPHEN 1000 MG: 500 TABLET ORAL at 05:05

## 2024-05-12 RX ADMIN — SODIUM CHLORIDE 1000 ML: 9 INJECTION, SOLUTION INTRAVENOUS at 05:05

## 2024-05-12 RX ADMIN — LOPERAMIDE HYDROCHLORIDE 4 MG: 2 CAPSULE ORAL at 12:05

## 2024-05-12 RX ADMIN — LOPERAMIDE HYDROCHLORIDE 4 MG: 2 CAPSULE ORAL at 09:05

## 2024-05-12 RX ADMIN — GABAPENTIN 300 MG: 300 CAPSULE ORAL at 03:05

## 2024-05-12 RX ADMIN — GABAPENTIN 300 MG: 300 CAPSULE ORAL at 08:05

## 2024-05-12 RX ADMIN — LOPERAMIDE HYDROCHLORIDE 4 MG: 2 CAPSULE ORAL at 05:05

## 2024-05-12 NOTE — NURSING
.[facility] Plan of Care Note  Dx: Malignant neoplasm of sigmoid colon     Shift Events: Large output from the ileostomy, Hypotensive but patient is asymptomatic. NSS 1L bolus given.    Goals of Care: Monitor output of MANDEEP and Ileostomy    Neuro: aox4    Vital Signs: VSS except BP soft    Respiratory: RA    Diet:  Low fiber/ residue    Is patient tolerating current diet? Yes    GTTS: IV abx    Urine Output/Bowel Movement: adequate UO; large amount ileostomy output    Drains/Tubes/Tube Feeds (include total output/shift): MANDEEP; Ileostomy; FC    Lines: 22G Right ACV      Accuchecks:None    Skin: abdominal incisions    Fall Risk Score: 10    Activity level? SBAx1    Any scheduled procedures?  None    Any safety concerns? Fall Risk    Other:

## 2024-05-12 NOTE — PLAN OF CARE
Problem: Adult Inpatient Plan of Care  Goal: Plan of Care Review  Outcome: Progressing  Goal: Patient-Specific Goal (Individualized)  Outcome: Progressing  Goal: Optimal Comfort and Wellbeing  Outcome: Progressing  Goal: Readiness for Transition of Care  Outcome: Progressing     Problem: Wound  Goal: Optimal Coping  Outcome: Progressing     Problem: Fall Injury Risk  Goal: Absence of Fall and Fall-Related Injury  Outcome: Progressing

## 2024-05-12 NOTE — ASSESSMENT & PLAN NOTE
Collette Castro is a 49 y.o. year old female with history of sigmoid cancer now s/p robotic converted to open sigmoidectomy with diverting loop ileostomy 5/9     Plan:  Feels well this morning.   Hypotensive overnight. Given 1L of cyrstalloid again this am. Likely due to losses from ostomy. Hgb stable  Drain Cr nromal  MMPR- continue, unable to take oral narcotics. Toradol   LRD  Ileostomy with 3.9Lslightly thickened output, imodium 4m qid ordered; ostomy nurses consulted  Vte ppx: SCD, may start lovenox again today  Ambulate  DC chamorro

## 2024-05-12 NOTE — PROGRESS NOTES
[facility]  Colorectal Surgery  Progress Note    Patient Name: Collette Castro  MRN: [id number]  Admission Date: 5/9/2024  Hospital Length of Stay: 3 days  Attending Physician: SANTIAGO Paris MD    Subjective:     Interval History: NAEON. AF, mildly hypotensive again overnight due to 3.9L ostomy output. 1L NS bolus given this am. Good UOP. Pain controlled and tolerating diet.    Post-Op Info:  Procedure(s) (LRB):  XI ROBOTIC COLECTOMY, SIGMOID (N/A)  CYSTOSCOPY, WITH URETERAL STENT INSERTION (Bilateral)  SIGMOIDOSCOPY, FLEXIBLE (N/A)  MOBILIZATION, SPLENIC FLEXURE, LAPAROSCOPIC  LYSIS, ADHESIONS  RESECTION, COLON, LOW ANTERIOR, robotic converted to open (N/A)   3 Days Post-Op      Medications:  Continuous Infusions:  Scheduled Meds:   acetaminophen  1,000 mg Oral Q8H    gabapentin  300 mg Oral TID    ketorolac  15 mg Intravenous Q6H    loperamide  4 mg Oral QID    piperacillin-tazobactam (Zosyn) IV (PEDS and ADULTS) (extended infusion is not appropriate)  4.5 g Intravenous Q8H     PRN Meds:   acetaminophen tablet 1,000 mg    gabapentin capsule 300 mg    ketorolac injection 15 mg    loperamide capsule 4 mg    piperacillin-tazobactam (ZOSYN) 4.5 g in dextrose 5 % in water (D5W) 100 mL IVPB (MB+)        Objective:     Vital Signs (Most Recent):  Temp: 97.9 °F (36.6 °C) (05/12/24 0800)  Pulse: 84 (05/12/24 0800)  Resp: 18 (05/12/24 0411)  BP: 95/64 (05/12/24 0800)  SpO2: 98 % (05/12/24 0800) Vital Signs (24h Range):  Temp:  [97.6 °F (36.4 °C)-98.5 °F (36.9 °C)] 97.9 °F (36.6 °C)  Pulse:  [] 84  Resp:  [18-20] 18  SpO2:  [94 %-98 %] 98 %  BP: ()/(53-88) 95/64     Intake/Output - Last 3 Shifts         05/10 0700 05/11 0659 05/11 0700 05/12 0659 05/12 0700  05/13 0659    P.O. 750 1060     I.V. (mL/kg) 1137.8 (16.2)      IV Piggyback 1656.3 1267.3 1088.6    Total Intake(mL/kg) 3544.1 (50.4) 2327.3 (33.1) 1088.6 (15.5)    Urine (mL/kg/hr) 1100 (0.7) 950 (0.6)     Drains 530 300     Stool 3875 3975 150     Total Output 5505 5225 150    Net -1960.9 -2897.7 +938.6           Stool Occurrence 1 x               Physical Exam  Constitutional:       General: She is not in acute distress.     Appearance: She is not ill-appearing.   HENT:      Head: Normocephalic and atraumatic.      Nose: Nose normal.      Mouth/Throat:      Mouth: Mucous membranes are moist.      Pharynx: Oropharynx is clear.   Eyes:      Extraocular Movements: Extraocular movements intact.      Conjunctiva/sclera: Conjunctivae normal.   Cardiovascular:      Rate and Rhythm: Normal rate and regular rhythm.      Pulses: Normal pulses.   Pulmonary:      Effort: Pulmonary effort is normal.   Abdominal:      General: Abdomen is flat.      Palpations: Abdomen is soft.      Tenderness: There is abdominal tenderness (appropriately TTP). There is no guarding or rebound.      Comments: Lower midline, transverse, and lap incisions cdi  Ileostomy pink and patent with slightly thickened bilious output   Musculoskeletal:         General: Normal range of motion.      Cervical back: Normal range of motion.   Skin:     General: Skin is warm and dry.      Capillary Refill: Capillary refill takes less than 2 seconds.   Neurological:      Mental Status: She is alert and oriented to person, place, and time.          Significant Labs:  BMP (Last 3 Results):   Recent Labs   Lab 05/10/24  0531 05/11/24  0548 05/11/24  1002 05/11/24  1800 05/12/24 0429      < > 158* 125* 109      < > 142 141 140   K 3.9   < > 3.7 3.9 4.1   *   < > 114* 112* 111*   CO2 20*   < > 22* 21* 22*   BUN 5*   < > 7 7 9   CREATININE 0.9   < > 0.9 0.9 0.8   CALCIUM 8.2*   < > 7.9* 8.5* 8.5*   MG 1.9  --   --   --  1.6    < > = values in this interval not displayed.     CBC (Last 3 Results):   Recent Labs   Lab 05/11/24  1002 05/11/24  1800 05/12/24 0429   WBC 5.59 7.25 6.78   RBC 2.74* 3.11* 2.98*   HGB 9.0* 10.1* 9.6*   HCT 28.2* 31.2* 30.6*    279 273   * 100* 103*   MCH  32.8* 32.5* 32.2*   MCHC 31.9* 32.4 31.4*     CRP (Last 3 Results):   Recent Labs   Lab 05/12/24  0429   CRP 55.1*       Significant Diagnostics:  I have reviewed all pertinent imaging results/findings within the past 24 hours.  Assessment/Plan:     * Malignant neoplasm of sigmoid colon  Collette Castro is a 49 y.o. year old female with history of sigmoid cancer now s/p robotic converted to open sigmoidectomy with diverting loop ileostomy 5/9     Plan:  Feels well this morning.   Hypotensive overnight. Given 1L of cyrstalloid again this am. Likely due to losses from ostomy. Hgb stable  Drain Cr nromal  MMPR- continue, unable to take oral narcotics. Toradol   LRD  Ileostomy with 3.9Lslightly thickened output, imodium 4m qid ordered; ostomy nurses consulted  Vte ppx: SCD, may start lovenox again today  Ambulate  CONNIE Reyes MD  Colorectal Surgery  [facility]

## 2024-05-12 NOTE — SUBJECTIVE & OBJECTIVE
Subjective:     Interval History: NAEON. AF, mildly hypotensive again overnight due to 3.9L ostomy output. 1L NS bolus given this am. Good UOP. Pain controlled and tolerating diet.    Post-Op Info:  Procedure(s) (LRB):  XI ROBOTIC COLECTOMY, SIGMOID (N/A)  CYSTOSCOPY, WITH URETERAL STENT INSERTION (Bilateral)  SIGMOIDOSCOPY, FLEXIBLE (N/A)  MOBILIZATION, SPLENIC FLEXURE, LAPAROSCOPIC  LYSIS, ADHESIONS  RESECTION, COLON, LOW ANTERIOR, robotic converted to open (N/A)   3 Days Post-Op      Medications:  Continuous Infusions:  Scheduled Meds:   acetaminophen  1,000 mg Oral Q8H    gabapentin  300 mg Oral TID    ketorolac  15 mg Intravenous Q6H    loperamide  4 mg Oral QID    piperacillin-tazobactam (Zosyn) IV (PEDS and ADULTS) (extended infusion is not appropriate)  4.5 g Intravenous Q8H     PRN Meds:   acetaminophen tablet 1,000 mg    gabapentin capsule 300 mg    ketorolac injection 15 mg    loperamide capsule 4 mg    piperacillin-tazobactam (ZOSYN) 4.5 g in dextrose 5 % in water (D5W) 100 mL IVPB (MB+)        Objective:     Vital Signs (Most Recent):  Temp: 97.9 °F (36.6 °C) (05/12/24 0800)  Pulse: 84 (05/12/24 0800)  Resp: 18 (05/12/24 0411)  BP: 95/64 (05/12/24 0800)  SpO2: 98 % (05/12/24 0800) Vital Signs (24h Range):  Temp:  [97.6 °F (36.4 °C)-98.5 °F (36.9 °C)] 97.9 °F (36.6 °C)  Pulse:  [] 84  Resp:  [18-20] 18  SpO2:  [94 %-98 %] 98 %  BP: ()/(53-88) 95/64     Intake/Output - Last 3 Shifts         05/10 0700  05/11 0659 05/11 0700  05/12 0659 05/12 0700  05/13 0659    P.O. 750 1060     I.V. (mL/kg) 1137.8 (16.2)      IV Piggyback 1656.3 1267.3 1088.6    Total Intake(mL/kg) 3544.1 (50.4) 2327.3 (33.1) 1088.6 (15.5)    Urine (mL/kg/hr) 1100 (0.7) 950 (0.6)     Drains 530 300     Stool 3875 3975 150    Total Output 5505 5225 150    Net -1960.9 -2897.7 +938.6           Stool Occurrence 1 x               Physical Exam  Constitutional:       General: She is not in acute distress.     Appearance: She is  not ill-appearing.   HENT:      Head: Normocephalic and atraumatic.      Nose: Nose normal.      Mouth/Throat:      Mouth: Mucous membranes are moist.      Pharynx: Oropharynx is clear.   Eyes:      Extraocular Movements: Extraocular movements intact.      Conjunctiva/sclera: Conjunctivae normal.   Cardiovascular:      Rate and Rhythm: Normal rate and regular rhythm.      Pulses: Normal pulses.   Pulmonary:      Effort: Pulmonary effort is normal.   Abdominal:      General: Abdomen is flat.      Palpations: Abdomen is soft.      Tenderness: There is abdominal tenderness (appropriately TTP). There is no guarding or rebound.      Comments: Lower midline, transverse, and lap incisions cdi  Ileostomy pink and patent with slightly thickened bilious output   Musculoskeletal:         General: Normal range of motion.      Cervical back: Normal range of motion.   Skin:     General: Skin is warm and dry.      Capillary Refill: Capillary refill takes less than 2 seconds.   Neurological:      Mental Status: She is alert and oriented to person, place, and time.          Significant Labs:  BMP (Last 3 Results):   Recent Labs   Lab 05/10/24  0531 05/11/24  0548 05/11/24  1002 05/11/24  1800 05/12/24 0429      < > 158* 125* 109      < > 142 141 140   K 3.9   < > 3.7 3.9 4.1   *   < > 114* 112* 111*   CO2 20*   < > 22* 21* 22*   BUN 5*   < > 7 7 9   CREATININE 0.9   < > 0.9 0.9 0.8   CALCIUM 8.2*   < > 7.9* 8.5* 8.5*   MG 1.9  --   --   --  1.6    < > = values in this interval not displayed.     CBC (Last 3 Results):   Recent Labs   Lab 05/11/24  1002 05/11/24  1800 05/12/24 0429   WBC 5.59 7.25 6.78   RBC 2.74* 3.11* 2.98*   HGB 9.0* 10.1* 9.6*   HCT 28.2* 31.2* 30.6*    279 273   * 100* 103*   MCH 32.8* 32.5* 32.2*   MCHC 31.9* 32.4 31.4*     CRP (Last 3 Results):   Recent Labs   Lab 05/12/24 0429   CRP 55.1*       Significant Diagnostics:  I have reviewed all pertinent imaging results/findings  within the past 24 hours.

## 2024-05-12 NOTE — PLAN OF CARE
[facility]  Initial Discharge Assessment       Primary Care Provider: Paulette Boone FNP    Admission Diagnosis: Cancer of sigmoid colon [C18.7]  Colon cancer [C18.9]    Admission Date: 5/9/2024  Expected Discharge Date: 5/15/2024    Transition of Care Barriers: None    Payor: AETNA / Plan: AETNA CHOICE POS / Product Type: Commercial /     Extended Emergency Contact Information  Primary Emergency Contact: LIAM CASTRO  Address: [address] [place] [address] [place]  Mobile Phone: [phone]  Relation: Spouse    Discharge Plan A: Home, Home with family  Discharge Plan B: Home with family      [facility] - [facility], [place] - [address]  [address] [address]  Phone: [phone] Fax: [phone]    [id number] [place], [place] - [address] [facility]  [address]  [address] [facility] [place] [address]  Phone: [phone] Fax: [phone]    [id number] [place], [place] - [address]  [address]  [facility] [address]  Phone: [phone] Fax: [phone]      Initial Assessment (most recent)       Adult Discharge Assessment - 05/12/24 0912          Discharge Assessment    Assessment Type Discharge Planning Assessment     Confirmed/corrected address, phone number and insurance Yes     Confirmed Demographics Correct on Facesheet     Source of Information patient     If unable to respond/provide information was family/caregiver contacted? Yes   SpouseBilly Castro- [phone]    Contact Name/Number Spouse- Francheska Epstein [phone]     Communicated MARGARITO with patient/caregiver Yes     Reason For Admission Malignant neoplasm of sigmoid colon     People in Home spouse   SpouseBilly Castro- [phone]    Facility Arrived From: Home     Do you expect to return to your current living situation? Yes     Do you have help at home or someone to help you  manage your care at home? Yes     Who are your caregiver(s) and their phone number(s)? Spouse- Francheska Castro- [phone]     Prior to hospitilization cognitive status: Alert/Oriented     Current cognitive status: Alert/Oriented     Walking or Climbing Stairs Difficulty no     Dressing/Bathing Difficulty no     Do you have any problems with: --   Pt denies    Home Accessibility stairs to enter home     Number of Stairs, Main Entrance none     Stair Railings, Main Entrance none     Landing, Stairs, Main Entrance no railings     Stairs Comment, Main Entrance none     Home Layout Able to live on 1st floor     Equipment Currently Used at Home none     Readmission within 30 days? No     Patient currently being followed by outpatient case management? No     Do you currently have service(s) that help you manage your care at home? No     Do you take prescription medications? Yes     Do you have prescription coverage? Yes     Coverage AETNA - AETNA CHOICE POS -     Do you have any problems affording any of your prescribed medications? No     Is the patient taking medications as prescribed? yes     Who is going to help you get home at discharge? Spouse- Francheska Castro- [phone]     How do you get to doctors appointments? car, drives self;family or friend will provide     Are you on dialysis? No     Do you take coumadin? No     Discharge Plan A Home;Home with family     Discharge Plan B Home with family     DME Needed Upon Discharge  other (see comments)   TBD    Discharge Plan discussed with: Spouse/sig other;Patient     Name(s) and Number(s) Spouse- Francheska Castro- [phone]     Transition of Care Barriers None        Social Isolation    How often do you feel lonely or isolated from those around you?  Never        Utilities    In the past 12 months has the electric, gas, oil, or water company threatened to shut off services in your home? No        Health Literacy    How often do you need to have someone help you when  you read instructions, pamphlets, or other written material from your doctor or pharmacy? Never        OTHER    Name(s) of People in Home Spouse- Francheska Castro- [phone]                        SW spoke with patient/spouse in 1046 for Discharge Planning Assessment. Per patient, Pt lives with spouse in a single family home on a slab foundation with zero steps to porch and point of entry.  Patient was independent with ADLS and DID NOT use DME or in-home assistive equipment. Pt is not on dialysis  or coumadin,  takes medications as prescribed / keeps refilled / has resources for all daily and prescriptive needs. Preferred pharmacy is [facility] - Agreeable to bedside delivery. Will have help from Spouse- Francheska Castro- [phone]  and other immediate family upon discharge - Spouse to provide transportation home.  All questions addressed. Unit and SW direct numbers provided. Will continue to follow for course of hospitalization    5/9/2024  5:07 AM  Cancer of sigmoid colon [C18.7]  Colon cancer [C18.9]    PCP: Paulette Boone FNP    PHARMACY:   [facility] - [facility], [place] - [address]  [address] [facility] [address]  Phone: [phone] Fax: [phone]    [id number] - [place], [place] - [address] [facility]  & [address]  [address] [address]  Phone: [phone] Fax: [phone]    [id number] [place], [place] - [address] [place] [address] AT [facility] [address]  [address]  [facility] [address]  Phone: [phone] Fax: [phone]      Payor: AETNA / Plan: AETNA CHOICE POS / Product Type: Commercial /       Megha Schaefer LMSW  PRN - [facility]  [id number]

## 2024-05-13 VITALS
HEART RATE: 84 BPM | TEMPERATURE: 98 F | SYSTOLIC BLOOD PRESSURE: 93 MMHG | RESPIRATION RATE: 20 BRPM | DIASTOLIC BLOOD PRESSURE: 57 MMHG | HEIGHT: 70 IN | BODY MASS INDEX: 22.19 KG/M2 | WEIGHT: 155 LBS | OXYGEN SATURATION: 96 %

## 2024-05-13 LAB
ANION GAP SERPL CALC-SCNC: 7 MMOL/L (ref 8–16)
BASOPHILS # BLD AUTO: 0.05 K/UL (ref 0–0.2)
BASOPHILS NFR BLD: 0.7 % (ref 0–1.9)
BUN SERPL-MCNC: 11 MG/DL (ref 6–20)
CALCIUM SERPL-MCNC: 9 MG/DL (ref 8.7–10.5)
CHLORIDE SERPL-SCNC: 114 MMOL/L (ref 95–110)
CO2 SERPL-SCNC: 20 MMOL/L (ref 23–29)
CREAT SERPL-MCNC: 0.8 MG/DL (ref 0.5–1.4)
CRP SERPL-MCNC: 33 MG/L (ref 0–8.2)
DIFFERENTIAL METHOD BLD: ABNORMAL
EOSINOPHIL # BLD AUTO: 0.4 K/UL (ref 0–0.5)
EOSINOPHIL NFR BLD: 5.2 % (ref 0–8)
ERYTHROCYTE [DISTWIDTH] IN BLOOD BY AUTOMATED COUNT: 13.2 % (ref 11.5–14.5)
EST. GFR  (NO RACE VARIABLE): >60 ML/MIN/1.73 M^2
GLUCOSE SERPL-MCNC: 106 MG/DL (ref 70–110)
HCT VFR BLD AUTO: 30.7 % (ref 37–48.5)
HGB BLD-MCNC: 9.7 G/DL (ref 12–16)
IMM GRANULOCYTES # BLD AUTO: 0.02 K/UL (ref 0–0.04)
IMM GRANULOCYTES NFR BLD AUTO: 0.3 % (ref 0–0.5)
LYMPHOCYTES # BLD AUTO: 0.9 K/UL (ref 1–4.8)
LYMPHOCYTES NFR BLD: 12.3 % (ref 18–48)
MAGNESIUM SERPL-MCNC: 1.4 MG/DL (ref 1.6–2.6)
MCH RBC QN AUTO: 32 PG (ref 27–31)
MCHC RBC AUTO-ENTMCNC: 31.6 G/DL (ref 32–36)
MCV RBC AUTO: 101 FL (ref 82–98)
MONOCYTES # BLD AUTO: 0.6 K/UL (ref 0.3–1)
MONOCYTES NFR BLD: 8.5 % (ref 4–15)
NEUTROPHILS # BLD AUTO: 5.1 K/UL (ref 1.8–7.7)
NEUTROPHILS NFR BLD: 73 % (ref 38–73)
NRBC BLD-RTO: 0 /100 WBC
PHOSPHATE SERPL-MCNC: 3.8 MG/DL (ref 2.7–4.5)
PLATELET # BLD AUTO: 317 K/UL (ref 150–450)
PMV BLD AUTO: 10.3 FL (ref 9.2–12.9)
POTASSIUM SERPL-SCNC: 4.5 MMOL/L (ref 3.5–5.1)
RBC # BLD AUTO: 3.03 M/UL (ref 4–5.4)
SODIUM SERPL-SCNC: 141 MMOL/L (ref 136–145)
WBC # BLD AUTO: 6.93 K/UL (ref 3.9–12.7)

## 2024-05-13 PROCEDURE — 85025 COMPLETE CBC W/AUTO DIFF WBC: CPT | Performed by: SURGERY

## 2024-05-13 PROCEDURE — 63600175 PHARM REV CODE 636 W HCPCS: Performed by: STUDENT IN AN ORGANIZED HEALTH CARE EDUCATION/TRAINING PROGRAM

## 2024-05-13 PROCEDURE — 25000003 PHARM REV CODE 250: Performed by: SURGERY

## 2024-05-13 PROCEDURE — 86140 C-REACTIVE PROTEIN: CPT | Performed by: STUDENT IN AN ORGANIZED HEALTH CARE EDUCATION/TRAINING PROGRAM

## 2024-05-13 PROCEDURE — 83735 ASSAY OF MAGNESIUM: CPT | Performed by: SURGERY

## 2024-05-13 PROCEDURE — 36415 COLL VENOUS BLD VENIPUNCTURE: CPT | Performed by: STUDENT IN AN ORGANIZED HEALTH CARE EDUCATION/TRAINING PROGRAM

## 2024-05-13 PROCEDURE — 80048 BASIC METABOLIC PNL TOTAL CA: CPT | Performed by: SURGERY

## 2024-05-13 PROCEDURE — 84100 ASSAY OF PHOSPHORUS: CPT | Performed by: SURGERY

## 2024-05-13 RX ORDER — LOPERAMIDE HYDROCHLORIDE 2 MG/1
4 CAPSULE ORAL 4 TIMES DAILY
Qty: 160 CAPSULE | Refills: 2 | Status: ON HOLD | OUTPATIENT
Start: 2024-05-13 | End: 2024-05-18

## 2024-05-13 RX ORDER — DEXTROMETHORPHAN HYDROBROMIDE, GUAIFENESIN 5; 100 MG/5ML; MG/5ML
650 LIQUID ORAL EVERY 8 HOURS
Qty: 60 TABLET | Refills: 0 | Status: SHIPPED | OUTPATIENT
Start: 2024-05-13

## 2024-05-13 RX ORDER — DIPHENOXYLATE HYDROCHLORIDE AND ATROPINE SULFATE 2.5; .025 MG/1; MG/1
1 TABLET ORAL 2 TIMES DAILY
Qty: 60 TABLET | Refills: 0 | Status: ON HOLD | OUTPATIENT
Start: 2024-05-13 | End: 2024-05-18 | Stop reason: HOSPADM

## 2024-05-13 RX ORDER — METHOCARBAMOL 500 MG/1
500 TABLET, FILM COATED ORAL 4 TIMES DAILY
Qty: 40 TABLET | Refills: 0 | Status: SHIPPED | OUTPATIENT
Start: 2024-05-13 | End: 2024-05-23

## 2024-05-13 RX ORDER — IBUPROFEN 600 MG/1
600 TABLET ORAL 3 TIMES DAILY
Qty: 60 TABLET | Refills: 0 | Status: SHIPPED | OUTPATIENT
Start: 2024-05-13

## 2024-05-13 RX ADMIN — LOPERAMIDE HYDROCHLORIDE 4 MG: 2 CAPSULE ORAL at 09:05

## 2024-05-13 RX ADMIN — KETOROLAC TROMETHAMINE 15 MG: 15 INJECTION, SOLUTION INTRAMUSCULAR; INTRAVENOUS at 05:05

## 2024-05-13 RX ADMIN — KETOROLAC TROMETHAMINE 15 MG: 15 INJECTION, SOLUTION INTRAMUSCULAR; INTRAVENOUS at 12:05

## 2024-05-13 NOTE — HOSPITAL COURSE
Ms. Castro is a 50yo female whop resented with sigmoid colon cancer who underwent robotic converted to open sigmoidectomy with diverting loop ileostomy. Postoperatively she did well. She tolerated a diet, her pain was controlled and she ambulated on her own. She did have some issues with high output from her ileostomy which improved with treatment with imodium 4mg 4xdaily. She was discharged home in good condition.

## 2024-05-13 NOTE — NURSING
.[facility] Plan of Care Note    Dx: Malignant neoplasm of sigmoid colon      Shift Events: Patient emptied and record her ileostomy output  and MANDEEP drains independently.    Goals of Care:  Proper care on stoma and application of ostomy bag to prevent skin irritation.      Neuro: aox4     Vital Signs: VSS      Respiratory: RA     Diet:  Low fiber/ residue     Is patient tolerating current diet? Yes     GTTS: None     Urine Output/Bowel Movement: adequate UO; large amount from ileostomy output     Drains/Tubes/Tube Feeds (include total output/shift): LLQ JPx1;  RLQ Ileostomy;      Lines: 22G Right ACV        Accuchecks:None     Skin: abdominal incisions     Fall Risk Score: 10     Activity level? independent     Any scheduled procedures?  None     Any safety concerns? Fall Risk     Other:

## 2024-05-13 NOTE — DISCHARGE SUMMARY
[facility]  Colorectal Surgery  Discharge Summary      Patient Name: Collette Castro  MRN: [id number]  Admission Date: 5/9/2024  Hospital Length of Stay: 4 days  Discharge Date and Time:  05/13/2024 8:12 AM  Attending Physician: SANTIAGO Paris MD   Discharging Provider: Sincere Madsen MD  Primary Care Provider: Paulette Boone FNP     HPI:  No notes on file    Procedure(s) (LRB):  XI ROBOTIC COLECTOMY, SIGMOID (N/A)  CYSTOSCOPY, WITH URETERAL STENT INSERTION (Bilateral)  SIGMOIDOSCOPY, FLEXIBLE (N/A)  MOBILIZATION, SPLENIC FLEXURE, LAPAROSCOPIC  LYSIS, ADHESIONS  RESECTION, COLON, LOW ANTERIOR, robotic converted to open (N/A)     Hospital Course:  Ms. Castro is a 48yo female whop resented with sigmoid colon cancer who underwent robotic converted to open sigmoidectomy with diverting loop ileostomy. Postoperatively she did well. She tolerated a diet, her pain was controlled and she ambulated on her own. She did have some issues with high output from her ileostomy which improved with treatment with imodium 4mg 4xdaily. She was discharged home in good condition.     Goals of Care Treatment Preferences:             Significant Diagnostic Studies: N/A    Pending Diagnostic Studies:       Procedure Component Value Units Date/Time    Specimen to Pathology, Surgery Other [[id number]] Collected: 05/09/24 1454    Order Status: Sent Lab Status: In process Updated: 05/10/24 0643    Specimen: Tissue           Final Active Diagnoses:    Diagnosis Date Noted POA    PRINCIPAL PROBLEM:  Malignant neoplasm of sigmoid colon [C18.7] 01/31/2024 Yes      Problems Resolved During this Admission:      Discharged Condition: good    Disposition: Home or Self Care    Follow Up:   Follow-up Information       SANTIAGO Paris MD. Schedule an appointment as soon as possible for a visit in 2 week(s).    Specialty: Colon and Rectal Surgery  Contact information:  [address]  [facility] [address]  [phone]                            Patient Instructions:      Diet Adult Regular     Notify your health care provider if you experience any of the following:  temperature >100.4     Notify your health care provider if you experience any of the following:  persistent nausea and vomiting or diarrhea     Notify your health care provider if you experience any of the following:  severe uncontrolled pain     Activity as tolerated     Medications:  Reconciled Home Medications:      Medication List        START taking these medications      acetaminophen 650 MG Tbsr  Commonly known as: TYLENOL  Take 1 tablet (650 mg total) by mouth every 8 (eight) hours.     diphenoxylate-atropine 2.5-0.025 mg 2.5-0.025 mg per tablet  Commonly known as: LOMOTIL  Take 1 tablet by mouth 2 (two) times a day.     ibuprofen 600 MG tablet  Commonly known as: ADVIL,MOTRIN  Take 1 tablet (600 mg total) by mouth 3 (three) times daily.     loperamide 2 mg capsule  Commonly known as: IMODIUM  Take 2 capsules (4 mg total) by mouth 4 (four) times daily.     methocarbamoL 500 MG Tab  Commonly known as: ROBAXIN  Take 1 tablet (500 mg total) by mouth 4 (four) times daily. for 10 days            CONTINUE taking these medications      ALPRAZolam 0.5 MG tablet  Commonly known as: XANAX  Take 0.5 mg by mouth 2 (two) times daily as needed.     metFORMIN 500 MG ER 24hr tablet  Commonly known as: GLUCOPHAGE-XR  Take 500 mg by mouth 2 (two) times daily with meals.            STOP taking these medications      ciprofloxacin HCl 500 MG tablet  Commonly known as: CIPRO     GAVILYTE-G 236-22.74-6.74 -5.86 gram suspension  Generic drug: polyethylene glycol     metroNIDAZOLE 500 MG tablet  Commonly known as: FLAGYL     MOTRIN -38 mg Tab  Generic drug: ibuprofen-diphenhydrAMINE cit            ASK your doctor about these medications      nitrofurantoin (macrocrystal-monohydrate) 100 MG capsule  Commonly known as: MACROBID  Take 100 mg by mouth 2 (two) times daily.     VAGIFEM 10 mcg Tab  Generic  drug: estradioL  Place vaginally.              Sincere Madsen MD  Colorectal Surgery  [facility]

## 2024-05-13 NOTE — PLAN OF CARE
[facility]  Discharge Final Note    Primary Care Provider: Paulette Boone FNP    Expected Discharge Date: 5/13/2024    Final Discharge Note (most recent)       Final Note - 05/13/24 1011          Final Note    Assessment Type Final Discharge Note     Anticipated Discharge Disposition Home or Self Care     Hospital Resources/Appts/Education Provided Provided patient/caregiver with written discharge plan information        Post-Acute Status    Patient choice form signed by patient/caregiver List with quality metrics by geographic area provided     Discharge Delays None known at this time                     Important Message from Medicare             Contact Info       SANTIAGO Paris MD   Specialty: Colon and Rectal Surgery    [address] [facility] [address] [facility] [address]   Phone: [phone]       Next Steps: Schedule an appointment as soon as possible for a visit in 2 week(s)          Pt d/c home with family. No d/c needs reported by medical team at this time.     Herlinda Medellin LCSW  Case Management/[facility]  [phone]

## 2024-05-14 ENCOUNTER — TELEPHONE (OUTPATIENT)
Dept: SURGERY | Facility: CLINIC | Age: 49
End: 2024-05-14
Payer: COMMERCIAL

## 2024-05-14 NOTE — TELEPHONE ENCOUNTER
Hx of sigmoid colon cancer --s/p 5/9 robotic converted to open sigmoidectomy with diverting loop ileostomy -- pt received 4 doses of imodium (5.12) prior to discharge (5.13) due to increased ileostomy output. Pt called this morning, she is afebrile - complaining of no ileostomy output since 0700 yesterday morning -- intermittent nausea and feeling of fullness in her belly. Advised patient to report to the ER for possible ileus     Dr Paris notified        ----- Message from Mimi Bobby, Patient Care Assistant sent at 5/14/2024  8:39 AM CDT -----  Type: Needs Medical Advice  Who Called:  zen  Best Call Back Number: [phone]    Additional Information: zen states she hasn't had no output  since 7 am yesterday morning , and she is feeling nauseated and stomach hurts, please call to further discuss thank you .

## 2024-05-15 ENCOUNTER — TELEPHONE (OUTPATIENT)
Dept: HEMATOLOGY/ONCOLOGY | Facility: CLINIC | Age: 49
End: 2024-05-15
Payer: COMMERCIAL

## 2024-05-15 ENCOUNTER — HOSPITAL ENCOUNTER (INPATIENT)
Facility: HOSPITAL | Age: 49
LOS: 3 days | Discharge: HOME | DRG: 389 | End: 2024-05-18
Attending: COLON & RECTAL SURGERY | Admitting: COLON & RECTAL SURGERY
Payer: COMMERCIAL

## 2024-05-15 DIAGNOSIS — K56.609 SBO (SMALL BOWEL OBSTRUCTION): ICD-10-CM

## 2024-05-15 LAB
ANION GAP SERPL CALC-SCNC: 10 MMOL/L (ref 8–16)
BUN SERPL-MCNC: 6 MG/DL (ref 6–20)
CALCIUM SERPL-MCNC: 8.4 MG/DL (ref 8.7–10.5)
CHLORIDE SERPL-SCNC: 106 MMOL/L (ref 95–110)
CO2 SERPL-SCNC: 21 MMOL/L (ref 23–29)
CREAT SERPL-MCNC: 0.8 MG/DL (ref 0.5–1.4)
EST. GFR  (NO RACE VARIABLE): >60 ML/MIN/1.73 M^2
GLUCOSE SERPL-MCNC: 107 MG/DL (ref 70–110)
POTASSIUM SERPL-SCNC: 4.2 MMOL/L (ref 3.5–5.1)
SODIUM SERPL-SCNC: 137 MMOL/L (ref 136–145)

## 2024-05-15 PROCEDURE — 85025 COMPLETE CBC W/AUTO DIFF WBC: CPT | Performed by: STUDENT IN AN ORGANIZED HEALTH CARE EDUCATION/TRAINING PROGRAM

## 2024-05-15 PROCEDURE — 36415 COLL VENOUS BLD VENIPUNCTURE: CPT | Performed by: STUDENT IN AN ORGANIZED HEALTH CARE EDUCATION/TRAINING PROGRAM

## 2024-05-15 PROCEDURE — 80048 BASIC METABOLIC PNL TOTAL CA: CPT | Performed by: STUDENT IN AN ORGANIZED HEALTH CARE EDUCATION/TRAINING PROGRAM

## 2024-05-15 PROCEDURE — 20600001 HC STEP DOWN PRIVATE ROOM

## 2024-05-15 RX ORDER — DEXTROSE MONOHYDRATE, SODIUM CHLORIDE, AND POTASSIUM CHLORIDE 50; 1.49; 9 G/1000ML; G/1000ML; G/1000ML
INJECTION, SOLUTION INTRAVENOUS CONTINUOUS
Status: DISCONTINUED | OUTPATIENT
Start: 2024-05-15 | End: 2024-05-18 | Stop reason: HOSPADM

## 2024-05-15 RX ORDER — KETOROLAC TROMETHAMINE 15 MG/ML
15 INJECTION, SOLUTION INTRAMUSCULAR; INTRAVENOUS EVERY 6 HOURS PRN
Status: DISCONTINUED | OUTPATIENT
Start: 2024-05-15 | End: 2024-05-18 | Stop reason: HOSPADM

## 2024-05-15 RX ORDER — ACETAMINOPHEN 325 MG/1
650 TABLET ORAL EVERY 6 HOURS
Status: DISCONTINUED | OUTPATIENT
Start: 2024-05-16 | End: 2024-05-18 | Stop reason: HOSPADM

## 2024-05-15 RX ORDER — SODIUM CHLORIDE 0.9 % (FLUSH) 0.9 %
10 SYRINGE (ML) INJECTION
Status: DISCONTINUED | OUTPATIENT
Start: 2024-05-15 | End: 2024-05-18 | Stop reason: HOSPADM

## 2024-05-15 RX ORDER — LORAZEPAM 0.5 MG/1
0.5 TABLET ORAL EVERY 6 HOURS PRN
Status: DISCONTINUED | OUTPATIENT
Start: 2024-05-16 | End: 2024-05-16

## 2024-05-15 RX ORDER — NALOXONE HCL 0.4 MG/ML
0.02 VIAL (ML) INJECTION
Status: DISCONTINUED | OUTPATIENT
Start: 2024-05-15 | End: 2024-05-18 | Stop reason: HOSPADM

## 2024-05-15 RX ORDER — ENOXAPARIN SODIUM 100 MG/ML
40 INJECTION SUBCUTANEOUS EVERY 24 HOURS
Status: DISCONTINUED | OUTPATIENT
Start: 2024-05-16 | End: 2024-05-18 | Stop reason: HOSPADM

## 2024-05-15 NOTE — TELEPHONE ENCOUNTER
Pt of Dr Paris currently in ER at [facility] awaiting bed at [facility].  Pt's  asking if there is another hospital Dr Paris would like pt transferred to.   reports pt has 550 cc of stool output from stoma, CT continues to show a blockage.  Pt's  will E mail CT report to Dr Ortiz' nurse JUJU Hagan, RN.  Dr Paris updated on pt status and recommends that [facility] is his choice hospital for pt.  MD wants to review CT results and will call pt.   Pt's  notified of MD recommendation.   JUJU Hagan also updated on above.

## 2024-05-16 ENCOUNTER — TELEPHONE (OUTPATIENT)
Dept: SURGERY | Facility: CLINIC | Age: 49
End: 2024-05-16
Payer: COMMERCIAL

## 2024-05-16 LAB
ANION GAP SERPL CALC-SCNC: 7 MMOL/L (ref 8–16)
BASOPHILS # BLD AUTO: 0.05 K/UL (ref 0–0.2)
BASOPHILS # BLD AUTO: 0.07 K/UL (ref 0–0.2)
BASOPHILS NFR BLD: 0.8 % (ref 0–1.9)
BASOPHILS NFR BLD: 0.9 % (ref 0–1.9)
BUN SERPL-MCNC: 7 MG/DL (ref 6–20)
CALCIUM SERPL-MCNC: 8.1 MG/DL (ref 8.7–10.5)
CHLORIDE SERPL-SCNC: 107 MMOL/L (ref 95–110)
CO2 SERPL-SCNC: 25 MMOL/L (ref 23–29)
CREAT SERPL-MCNC: 0.7 MG/DL (ref 0.5–1.4)
CRP SERPL-MCNC: 24.4 MG/L (ref 0–8.2)
DIFFERENTIAL METHOD BLD: ABNORMAL
DIFFERENTIAL METHOD BLD: ABNORMAL
EOSINOPHIL # BLD AUTO: 0.2 K/UL (ref 0–0.5)
EOSINOPHIL # BLD AUTO: 0.3 K/UL (ref 0–0.5)
EOSINOPHIL NFR BLD: 3.4 % (ref 0–8)
EOSINOPHIL NFR BLD: 3.7 % (ref 0–8)
ERYTHROCYTE [DISTWIDTH] IN BLOOD BY AUTOMATED COUNT: 13 % (ref 11.5–14.5)
ERYTHROCYTE [DISTWIDTH] IN BLOOD BY AUTOMATED COUNT: 13 % (ref 11.5–14.5)
EST. GFR  (NO RACE VARIABLE): >60 ML/MIN/1.73 M^2
GLUCOSE SERPL-MCNC: 120 MG/DL (ref 70–110)
HCT VFR BLD AUTO: 28.7 % (ref 37–48.5)
HCT VFR BLD AUTO: 31.1 % (ref 37–48.5)
HGB BLD-MCNC: 10.6 G/DL (ref 12–16)
HGB BLD-MCNC: 9.2 G/DL (ref 12–16)
IMM GRANULOCYTES # BLD AUTO: 0.03 K/UL (ref 0–0.04)
IMM GRANULOCYTES # BLD AUTO: 0.06 K/UL (ref 0–0.04)
IMM GRANULOCYTES NFR BLD AUTO: 0.5 % (ref 0–0.5)
IMM GRANULOCYTES NFR BLD AUTO: 0.8 % (ref 0–0.5)
LYMPHOCYTES # BLD AUTO: 0.8 K/UL (ref 1–4.8)
LYMPHOCYTES # BLD AUTO: 0.9 K/UL (ref 1–4.8)
LYMPHOCYTES NFR BLD: 12.1 % (ref 18–48)
LYMPHOCYTES NFR BLD: 12.6 % (ref 18–48)
MCH RBC QN AUTO: 31.8 PG (ref 27–31)
MCH RBC QN AUTO: 31.9 PG (ref 27–31)
MCHC RBC AUTO-ENTMCNC: 32.1 G/DL (ref 32–36)
MCHC RBC AUTO-ENTMCNC: 34.1 G/DL (ref 32–36)
MCV RBC AUTO: 94 FL (ref 82–98)
MCV RBC AUTO: 99 FL (ref 82–98)
MONOCYTES # BLD AUTO: 0.8 K/UL (ref 0.3–1)
MONOCYTES # BLD AUTO: 0.9 K/UL (ref 0.3–1)
MONOCYTES NFR BLD: 12.3 % (ref 4–15)
MONOCYTES NFR BLD: 13.1 % (ref 4–15)
NEUTROPHILS # BLD AUTO: 4.3 K/UL (ref 1.8–7.7)
NEUTROPHILS # BLD AUTO: 5.2 K/UL (ref 1.8–7.7)
NEUTROPHILS NFR BLD: 69.8 % (ref 38–73)
NEUTROPHILS NFR BLD: 70 % (ref 38–73)
NRBC BLD-RTO: 0 /100 WBC
NRBC BLD-RTO: 0 /100 WBC
PLATELET # BLD AUTO: 297 K/UL (ref 150–450)
PLATELET # BLD AUTO: 341 K/UL (ref 150–450)
PMV BLD AUTO: 10.2 FL (ref 9.2–12.9)
PMV BLD AUTO: 9.4 FL (ref 9.2–12.9)
POTASSIUM SERPL-SCNC: 4.1 MMOL/L (ref 3.5–5.1)
RBC # BLD AUTO: 2.89 M/UL (ref 4–5.4)
RBC # BLD AUTO: 3.32 M/UL (ref 4–5.4)
SODIUM SERPL-SCNC: 139 MMOL/L (ref 136–145)
WBC # BLD AUTO: 6.19 K/UL (ref 3.9–12.7)
WBC # BLD AUTO: 7.41 K/UL (ref 3.9–12.7)

## 2024-05-16 PROCEDURE — 86140 C-REACTIVE PROTEIN: CPT | Performed by: STUDENT IN AN ORGANIZED HEALTH CARE EDUCATION/TRAINING PROGRAM

## 2024-05-16 PROCEDURE — 80048 BASIC METABOLIC PNL TOTAL CA: CPT | Performed by: STUDENT IN AN ORGANIZED HEALTH CARE EDUCATION/TRAINING PROGRAM

## 2024-05-16 PROCEDURE — 63600175 PHARM REV CODE 636 W HCPCS: Performed by: STUDENT IN AN ORGANIZED HEALTH CARE EDUCATION/TRAINING PROGRAM

## 2024-05-16 PROCEDURE — 20600001 HC STEP DOWN PRIVATE ROOM

## 2024-05-16 PROCEDURE — 85025 COMPLETE CBC W/AUTO DIFF WBC: CPT | Performed by: STUDENT IN AN ORGANIZED HEALTH CARE EDUCATION/TRAINING PROGRAM

## 2024-05-16 PROCEDURE — 25000003 PHARM REV CODE 250: Performed by: STUDENT IN AN ORGANIZED HEALTH CARE EDUCATION/TRAINING PROGRAM

## 2024-05-16 PROCEDURE — 36415 COLL VENOUS BLD VENIPUNCTURE: CPT | Performed by: STUDENT IN AN ORGANIZED HEALTH CARE EDUCATION/TRAINING PROGRAM

## 2024-05-16 RX ORDER — LORAZEPAM 2 MG/ML
0.5 INJECTION INTRAMUSCULAR EVERY 6 HOURS PRN
Status: DISCONTINUED | OUTPATIENT
Start: 2024-05-16 | End: 2024-05-18 | Stop reason: HOSPADM

## 2024-05-16 RX ADMIN — DEXTROSE MONOHYDRATE, SODIUM CHLORIDE, AND POTASSIUM CHLORIDE: 50; 9; 1.49 INJECTION, SOLUTION INTRAVENOUS at 03:05

## 2024-05-16 RX ADMIN — METHOCARBAMOL 1000 MG: 100 INJECTION, SOLUTION INTRAMUSCULAR; INTRAVENOUS at 02:05

## 2024-05-16 RX ADMIN — KETOROLAC TROMETHAMINE 15 MG: 15 INJECTION, SOLUTION INTRAMUSCULAR; INTRAVENOUS at 03:05

## 2024-05-16 RX ADMIN — SODIUM CHLORIDE, POTASSIUM CHLORIDE, SODIUM LACTATE AND CALCIUM CHLORIDE 1000 ML: 600; 310; 30; 20 INJECTION, SOLUTION INTRAVENOUS at 05:05

## 2024-05-16 RX ADMIN — METHOCARBAMOL 1000 MG: 100 INJECTION, SOLUTION INTRAMUSCULAR; INTRAVENOUS at 12:05

## 2024-05-16 RX ADMIN — METHOCARBAMOL 1000 MG: 100 INJECTION, SOLUTION INTRAMUSCULAR; INTRAVENOUS at 06:05

## 2024-05-16 RX ADMIN — METHOCARBAMOL 1000 MG: 100 INJECTION, SOLUTION INTRAMUSCULAR; INTRAVENOUS at 11:05

## 2024-05-16 RX ADMIN — ACETAMINOPHEN 650 MG: 325 TABLET ORAL at 05:05

## 2024-05-16 RX ADMIN — DEXTROSE MONOHYDRATE, SODIUM CHLORIDE, AND POTASSIUM CHLORIDE: 50; 9; 1.49 INJECTION, SOLUTION INTRAVENOUS at 12:05

## 2024-05-16 RX ADMIN — LORAZEPAM 0.5 MG: 2 INJECTION INTRAMUSCULAR; INTRAVENOUS at 04:05

## 2024-05-16 RX ADMIN — ACETAMINOPHEN 650 MG: 325 TABLET ORAL at 11:05

## 2024-05-16 RX ADMIN — ENOXAPARIN SODIUM 40 MG: 40 INJECTION SUBCUTANEOUS at 05:05

## 2024-05-16 NOTE — H&P
Colon and Rectal Surgery History and Physical    Patient name: Collette Castro   YOB: 1975   MRN: [id number]    Subjective:       Patient ID: Collette Castro is a 49 y.o. female.    Chief Complaint: No chief complaint on file.    HPI  Ms. Castro is a 50yo female hx of cervical cancer s/p hysterectomy, ovarian pinning, and chemotherapy and pelvic radiation who recently underwent robotic converted to open sigoidectomy with DLI for sigmoid colon cancer who presents with abdominal pain and nausea. Ms. Castro was admitted from 5/9-5/13 following her surgery for her sigmoid colon cancer. Postop she had immediate return of bowel function with high output ileostomy. She required treatment with imodium up to 4mg 4 x daily to keep ileostomy output at 1.5L. Following discharge her ileostomy output dropped off and she developed bloating, abdominal pain and nausea. She was evaluated at OSH and had diffuse bowel dilation to the level of her diverting ileostomy. Findings on CT were concerning for SBO vs ileus. She was treated with NG tube and her imodium was held at OSH. Her symptoms are now starting to improve and her ostomy becoming functional.  She was transferred to [facility] for continuity of care.       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*) - Ariella Godwin Syndrome Analyses with CancerNext-Expanded (77 genes)    Pre-diabetes     Sphincter of Oddi dysfunction        Past Surgical History:   Procedure Laterality Date    CHOLECYSTECTOMY      COLONOSCOPY       CYSTOSCOPY W/ URETERAL STENT PLACEMENT Bilateral 5/9/2024    Procedure: CYSTOSCOPY, WITH URETERAL STENT INSERTION;  Surgeon: Cas Bang MD;  Location: [facility] OR 2ND FLR;  Service: Urology;  Laterality: Bilateral;    EXPLORATORY LAPAROTOMY      FLEXIBLE SIGMOIDOSCOPY N/A 5/9/2024    Procedure: SIGMOIDOSCOPY, FLEXIBLE;  Surgeon: SANTIAGO Paris MD;  Location: [facility] OR 2ND FLR;  Service: Colon and Rectal;  Laterality: N/A;    LOW ANTERIOR RESECTION OF COLON N/A 5/9/2024    Procedure: RESECTION, COLON, LOW ANTERIOR, robotic converted to open;  Surgeon: SANTIAGO Paris MD;  Location: [facility] OR 2ND FLR;  Service: Colon and Rectal;  Laterality: N/A;    LYSIS OF ADHESIONS  5/9/2024    Procedure: LYSIS, ADHESIONS;  Surgeon: SANTIAGO Paris MD;  Location: [facility] OR 2ND FLR;  Service: Colon and Rectal;;  extensive, greater  than 2 hours; 22 modifier    MOBILIZATION, SPLENIC FLEXURE, LAPAROSCOPIC  5/9/2024    Procedure: MOBILIZATION, SPLENIC FLEXURE, LAPAROSCOPIC;  Surgeon: SANTIAGO Paris MD;  Location: [facility] OR 2ND FLR;  Service: Colon and Rectal;;  22 modifier; greater than 2 hours    OVARY SURGERY      relocation    ROBOT-ASSISTED LAPAROSCOPIC RESECTION OF SIGMOID COLON USING DA ESTEFANI XI N/A 5/9/2024    Procedure: XI ROBOTIC COLECTOMY, SIGMOID;  Surgeon: SANTIAGO Paris MD;  Location: [facility] OR 2ND FLR;  Service: Colon and Rectal;  Laterality: N/A;  converted to open    thryoid nodule         No current facility-administered medications for this encounter.     Current Outpatient Medications   Medication Sig Dispense Refill    acetaminophen (TYLENOL) 650 MG TbSR Take 1 tablet (650 mg total) by mouth every 8 (eight) hours. 60 tablet 0    ALPRAZolam (XANAX) 0.5 MG tablet Take 0.5 mg by mouth 2 (two) times daily as needed.      diphenoxylate-atropine 2.5-0.025 mg (LOMOTIL) 2.5-0.025 mg per tablet Take 1 tablet by mouth 2 (two) times a day. 60 tablet 0    ibuprofen (ADVIL,MOTRIN) 600 MG tablet Take 1 tablet (600 mg  total) by mouth 3 (three) times daily. 60 tablet 0    loperamide (IMODIUM) 2 mg capsule Take 2 capsules (4 mg total) by mouth 4 (four) times daily. 160 capsule 2    metFORMIN (GLUCOPHAGE-XR) 500 MG ER 24hr tablet Take 500 mg by mouth 2 (two) times daily with meals.      methocarbamoL (ROBAXIN) 500 MG Tab Take 1 tablet (500 mg total) by mouth 4 (four) times daily. for 10 days 40 tablet 0    nitrofurantoin, macrocrystal-monohydrate, (MACROBID) 100 MG capsule Take 100 mg by mouth 2 (two) times daily. (Patient not taking: Reported on 4/17/2024)      VAGIFEM 10 mcg Tab Place vaginally. (Patient not taking: Reported on 4/17/2024)       Facility-Administered Medications Ordered in Other Encounters   Medication Dose Route Frequency Provider Last Rate Last Admin    gabapentin capsule 300 mg  300 mg Oral TID Natalya Rivera NP   300 mg at 05/12/24 2132       Family History   Problem Relation Name Age of Onset    Colon cancer Maternal Grandfather  67    Lymphoma Maternal Cousin      Lung cancer Other         Social History     Socioeconomic History    Marital status:    Tobacco Use    Smoking status: Never    Smokeless tobacco: Never   Substance and Sexual Activity    Alcohol use: Not Currently     Comment: once a month    Drug use: Never    Sexual activity: Yes     Partners: Male     Birth control/protection: See Surgical Hx     Social Determinants of Health     Financial Resource Strain: Low Risk  (5/10/2024)    Overall Financial Resource Strain (CARDIA)     Difficulty of Paying Living Expenses: Not hard at all   Food Insecurity: No Food Insecurity (5/10/2024)    Hunger Vital Sign     Worried About Running Out of Food in the Last Year: Never true     Ran Out of Food in the Last Year: Never true   Transportation Needs: No Transportation Needs (5/10/2024)    TRANSPORTATION NEEDS     Transportation : No   Physical Activity: Sufficiently Active (4/23/2024)    Exercise Vital Sign     Days of Exercise per Week: 3 days      Minutes of Exercise per Session: 90 min   Stress: No Stress Concern Present (5/10/2024)    [employer] [place] [employer] - Occupational Stress Questionnaire     Feeling of Stress : Only a little   Housing Stability: Low Risk  (5/10/2024)    Housing Stability Vital Sign     Unable to Pay for Housing in the Last Year: No     Homeless in the Last Year: No       Review of Systems   Constitutional: Negative.    HENT: Negative.     Eyes: Negative.    Respiratory: Negative.     Cardiovascular: Negative.    Gastrointestinal:  Positive for abdominal pain, constipation and nausea. Negative for vomiting.   Genitourinary:  Negative for dysuria.   Musculoskeletal:  Negative for arthralgias.   Neurological:  Negative for light-headedness.   Psychiatric/Behavioral:  Negative for agitation.        Objective:      Physical Exam      Physical Exam:  There were no vitals taken for this visit.  General: no distress  Head: normocephalic, NG with thin bilious output  Neck: supple, symmetrical, trachea midline  Lungs:  normal respiratory effort  Heart: regular rate and rhythm  Abdomen: soft, minimal distention, appropriate tenderness, incisions healing, ostomy in place viable and productive  Extremities: no cyanosis or edema, or clubbing    Laboratory Studies:  Complete Blood Count:  Lab Results   Component Value Date/Time    WBC 6.93 05/13/2024 05:01 AM    HGB 9.7 (L) 05/13/2024 05:01 AM    HCT 30.7 (L) 05/13/2024 05:01 AM    RBC 3.03 (L) 05/13/2024 05:01 AM     05/13/2024 05:01 AM       Basic Chemistry Panel:  Lab Results   Component Value Date/Time     05/13/2024 05:01 AM    K 4.5 05/13/2024 05:01 AM     (H) 05/13/2024 05:01 AM    CO2 20 (L) 05/13/2024 05:01 AM    BUN 11 05/13/2024 05:01 AM    CREATININE 0.8 05/13/2024 05:01 AM    CALCIUM 9.0 05/13/2024 05:01 AM       Lab Results   Component Value Date/Time    CRP 33.0 (H) 05/13/2024 05:01 AM           Assessment:       1. SBO (small bowel obstruction)       50yo female who recently underwent robotic converted to open sigmoidectomy with DLI for sigmoid colon cancer presents with symptoms of abdominal bloating, pain and and nausea. Workup including CT scan and labs are consistent with sbo vs ileus. Suspect treatment with imodium which was initially for high output ileostomy may have caused decreased intestinal motility. Small bowel is dilated up to level of ileostomy. She has been treated at OSH awaiting transfer with NG tube and bowel rest and her symptoms have now been improving. She is now having ileostomy output.   Plan:       Admit  Keep NG tonight  Hold imodium  IVF  Prn pain control  Prn anti-emetics  Ambulate  Vte pps: RAMAN, phil Madsen MD  Colon & Rectal Fellow

## 2024-05-16 NOTE — TELEPHONE ENCOUNTER
See note below from CHRIS Gamino RN -- CT report received from Cyrus Castro and reviewed by Dr. Paris.     Dr Paris will call pt to discuss plan.    ----- Message from Melissa Aaron LPN sent at 5/15/2024  2:22 PM CDT -----    ----- Message -----  From: Bianca Jean Baptiste  Sent: 5/15/2024   1:14 PM CDT  To: [facility]ricks[facility] Staff    Type: Needs Medical Advice  Who Called:  Patient   Symptoms (please be specific):    How long has patient had these symptoms:    Pharmacy name and phone #:    Best Call Back Number:   Additional Information: Patient is requesting a call back from Zaynab.

## 2024-05-16 NOTE — ASSESSMENT & PLAN NOTE
50yo female hx of cervical cancer s/p hysterectomy, ovarian pinning, and chemotherapy and pelvic radiation who recently underwent robotic converted to open sigmoidectomy with DLI for sigmoid colon cancer 5/9/24 who represents with concerns of SBO.     -Admit to colorectal surgery  -Keep NG tonight  -Hold imodium  -IVF  -Prn pain control  -Prn anti-emetics  -Ambulate  -Vte pps: SCD, lovenox

## 2024-05-16 NOTE — HPI
Ms. Castro is a 50yo female hx of cervical cancer s/p hysterectomy, ovarian pinning, and chemotherapy and pelvic radiation who recently underwent robotic converted to open sigoidectomy with DLI for sigmoid colon cancer who presents with abdominal pain and nausea. Ms. Castro was admitted from 5/9-5/13 following her surgery for her sigmoid colon cancer. Postop she had immediate return of bowel function with high output ileostomy. She required treatment with imodium up to 4mg 4 x daily to keep ileostomy output at 1.5L. Following discharge her ileostomy output dropped off and she developed bloating, abdominal pain and nausea. She was evaluated at OSH and had diffuse bowel dilation to the level of her diverting ileostomy. Findings on CT were concerning for SBO vs ileus. She was treated with NG tube and her imodium was held at OSH. Her symptoms are now starting to improve and her ostomy becoming functional. She was transferred to [facility] for continuity of care.

## 2024-05-16 NOTE — SUBJECTIVE & OBJECTIVE
Current Facility-Administered Medications on File Prior to Encounter   Medication    gabapentin capsule 300 mg     Current Outpatient Medications on File Prior to Encounter   Medication Sig    acetaminophen (TYLENOL) 650 MG TbSR Take 1 tablet (650 mg total) by mouth every 8 (eight) hours.    ALPRAZolam (XANAX) 0.5 MG tablet Take 0.5 mg by mouth 2 (two) times daily as needed.    diphenoxylate-atropine 2.5-0.025 mg (LOMOTIL) 2.5-0.025 mg per tablet Take 1 tablet by mouth 2 (two) times a day.    ibuprofen (ADVIL,MOTRIN) 600 MG tablet Take 1 tablet (600 mg total) by mouth 3 (three) times daily.    loperamide (IMODIUM) 2 mg capsule Take 2 capsules (4 mg total) by mouth 4 (four) times daily.    metFORMIN (GLUCOPHAGE-XR) 500 MG ER 24hr tablet Take 500 mg by mouth 2 (two) times daily with meals.    methocarbamoL (ROBAXIN) 500 MG Tab Take 1 tablet (500 mg total) by mouth 4 (four) times daily. for 10 days    nitrofurantoin, macrocrystal-monohydrate, (MACROBID) 100 MG capsule Take 100 mg by mouth 2 (two) times daily. (Patient not taking: Reported on 4/17/2024)    VAGIFEM 10 mcg Tab Place vaginally. 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*) - Amb Godwin Syndrome Analyses with [employer]-Expanded (77 genes)    Pre-diabetes     Sphincter of Oddi dysfunction      Past Surgical History:   Procedure Laterality Date    CHOLECYSTECTOMY      COLONOSCOPY      CYSTOSCOPY W/ URETERAL STENT PLACEMENT Bilateral 5/9/2024     Procedure: CYSTOSCOPY, WITH URETERAL STENT INSERTION;  Surgeon: Cas Bang MD;  [facility];  Service: Urology;  Laterality: Bilateral;    EXPLORATORY LAPAROTOMY      FLEXIBLE SIGMOIDOSCOPY N/A 5/9/2024    Procedure: SIGMOIDOSCOPY, FLEXIBLE;  Surgeon: SANTIAGO Paris MD;  [facility];  Service: Colon and Rectal;  Laterality: N/A;    LOW ANTERIOR RESECTION OF COLON N/A 5/9/2024    Procedure: RESECTION, COLON, LOW ANTERIOR, robotic converted to open;  Surgeon: SANTIAGO Paris MD;  [facility];  Service: Colon and Rectal;  Laterality: N/A;    LYSIS OF ADHESIONS  5/9/2024    Procedure: LYSIS, ADHESIONS;  Surgeon: SANTIAGO Paris MD;  [facility];  Service: Colon and Rectal;;  extensive, greater  than 2 hours; 22 modifier    MOBILIZATION, SPLENIC FLEXURE, LAPAROSCOPIC  5/9/2024    Procedure: MOBILIZATION, SPLENIC FLEXURE, LAPAROSCOPIC;  Surgeon: SANTIAGO Paris MD;  [facility];  Service: Colon and Rectal;;  22 modifier; greater than 2 hours    OVARY SURGERY      relocation    ROBOT-ASSISTED LAPAROSCOPIC RESECTION OF SIGMOID COLON USING DA ESTEFANI XI N/A 5/9/2024    Procedure: XI ROBOTIC COLECTOMY, SIGMOID;  Surgeon: SANTIAGO Paris MD;  [facility];  Service: Colon and Rectal;  Laterality: N/A;  converted to open    thryoid nodule       Family History       Problem Relation (Age of Onset)    Colon cancer Maternal Grandfather (67)    Lung cancer Other    Lymphoma Maternal Cousin          Tobacco Use    Smoking status: Never    Smokeless tobacco: Never   Substance and Sexual Activity    Alcohol use: Not Currently     Comment: once a month    Drug use: Never    Sexual activity: Yes     Partners: Male     Birth control/protection: See Surgical Hx     Review of Systems   Constitutional: Negative.    HENT: Negative.     Respiratory: Negative.     Cardiovascular: Negative.    Gastrointestinal:  Positive for abdominal pain,  constipation and nausea. Negative for vomiting.   Genitourinary: Negative.    Skin: Negative.    Neurological: Negative.    Psychiatric/Behavioral: Negative.       Objective:     Vital Signs (Most Recent):    Vital Signs (24h Range):  Temp:  [98.2 °F (36.8 °C)-99.1 °F (37.3 °C)] 98.2 °F (36.8 °C)  Pulse:  [] 94  Resp:  [16-17] 16  SpO2:  [94 %-99 %] 94 %  BP: (103-111)/(70-79) 106/70        There is no height or weight on file to calculate BMI.     Physical Exam  Constitutional:       Appearance: Normal appearance.   HENT:      Head: Normocephalic and atraumatic.      Nose:      Comments: NGT     Mouth/Throat:      Mouth: Mucous membranes are moist.   Cardiovascular:      Rate and Rhythm: Regular rhythm.   Pulmonary:      Effort: Pulmonary effort is normal. No respiratory distress.   Abdominal:      Palpations: Abdomen is soft.      Tenderness: There is abdominal tenderness.   Skin:     General: Skin is warm.   Neurological:      General: No focal deficit present.      Mental Status: She is alert and oriented to person, place, and time. Mental status is at baseline.   Psychiatric:         Mood and Affect: Mood normal.         Behavior: Behavior normal.         Thought Content: Thought content normal.            I have reviewed all pertinent lab results within the past 24 hours.    Significant Diagnostics:  I have reviewed all pertinent imaging results/findings within the past 24 hours.

## 2024-05-16 NOTE — SUBJECTIVE & OBJECTIVE
Subjective:     Interval History: no acute events overnite, NGT dced, ileostomy small amt of output, no n/v    Post-Op Info:  * No surgery found *          Medications:  Continuous Infusions:   dextrose 5 % and 0.9 % NaCl with KCl 20 mEq   Intravenous Continuous 75 mL/hr at 05/16/24 0754 Rate Verify at 05/16/24 0754     Scheduled Meds:   acetaminophen  650 mg Oral Q6H    enoxparin  40 mg Subcutaneous Daily    methocarbamol (ROBAXIN) IVPB  1,000 mg Intravenous Q8H     PRN Meds:   acetaminophen tablet 650 mg    enoxaparin injection 40 mg    methocarbamoL (ROBAXIN) 1,000 mg in dextrose 5 % (D5W) 100 mL IVPB        Objective:     Vital Signs (Most Recent):  Temp: 98.5 °F (36.9 °C) (05/16/24 1119)  Pulse: 95 (05/16/24 1119)  Resp: 18 (05/16/24 1119)  BP: (!) 102/58 (05/16/24 1119)  SpO2: 95 % (05/16/24 1119) Vital Signs (24h Range):  Temp:  [98.1 °F (36.7 °C)-98.5 °F (36.9 °C)] 98.5 °F (36.9 °C)  Pulse:  [89-98] 95  Resp:  [16-18] 18  SpO2:  [93 %-99 %] 95 %  BP: ()/(55-74) 102/58     Intake/Output - Last 3 Shifts         05/14 0700  05/15 0659 05/15 0700  05/16 0659 05/16 0700  05/17 0659    I.V. (mL/kg)  400.7 (5.4) 88.7 (1.2)    IV Piggyback  524.5 662.3    Total Intake(mL/kg)  925.2 (12.5) 751 (10.2)    Drains  580     Stool  200     Total Output  780     Net  +145.2 +751                    Physical Exam  Vitals and nursing note reviewed.   Constitutional:       Appearance: She is well-developed.   HENT:      Head: Normocephalic.   Eyes:      Pupils: Pupils are equal, round, and reactive to light.   Cardiovascular:      Rate and Rhythm: Normal rate and regular rhythm.      Heart sounds: Normal heart sounds.   Pulmonary:      Effort: Pulmonary effort is normal. No respiratory distress.      Breath sounds: Normal breath sounds. No wheezing or rales.   Abdominal:      Palpations: Abdomen is soft. There is no mass.      Tenderness: There is no guarding or rebound.      Comments: Abd inc line healing  well  Ileosotmy thin output   Skin:     General: Skin is warm and dry.   Neurological:      Mental Status: She is alert and oriented to person, place, and time.                Significant Labs:  BMP (Last 3 Results):   Recent Labs   Lab 05/12/24  0429 05/12/24  1535 05/13/24  0501 05/15/24  2302 05/16/24  0704    115* 106 107 120*    140 141 137 139   K 4.1 4.5 4.5 4.2 4.1   * 111* 114* 106 107   CO2 22* 21* 20* 21* 25   BUN 9 6 11 6 7   CREATININE 0.8 0.8 0.8 0.8 0.7   CALCIUM 8.5* 9.1 9.0 8.4* 8.1*   MG 1.6 1.5* 1.4*  --   --      CBC (Last 3 Results):   Recent Labs   Lab 05/13/24  0501 05/15/24  2301 05/16/24  0704   WBC 6.93 7.41 6.19   RBC 3.03* 3.32* 2.89*   HGB 9.7* 10.6* 9.2*   HCT 30.7* 31.1* 28.7*    297 341   * 94 99*   MCH 32.0* 31.9* 31.8*   MCHC 31.6* 34.1 32.1       Significant Diagnostics:  None

## 2024-05-16 NOTE — PLAN OF CARE
[facility]  Initial Discharge Assessment       Primary Care Provider: Paulette Boone FNP    Admission Diagnosis: SBO (small bowel obstruction) [K56.609]    Admission Date: 5/15/2024  Expected Discharge Date: 5/19/2024    Transition of Care Barriers: None    Payor: AETNA / Plan: AETNA CHOICE POS / Product Type: Commercial /     Extended Emergency Contact Information  Primary Emergency Contact: LIAM VILA  [address] [place] [address] [place]  Mobile Phone: [phone]  Relation: Spouse    Discharge Plan A: Home, Home with family         [facility] [place], [place] - [address]  [address] [facility] [place] [address]  Phone: [phone] Fax: [phone]    [id number] [place], [place] - [address] AT [facility]  [address]  [address]  [place] [address]  Phone: [phone] Fax: [phone]    [id number] - [place], [place] - [address]  [address]  [place] [address]  Phone: [phone] Fax: [phone]      Initial Assessment (most recent)       Adult Discharge Assessment - 05/16/24 1159          Discharge Assessment    Assessment Type Discharge Planning Assessment     Confirmed/corrected address, phone number and insurance Yes     Confirmed Demographics Correct on Facesheet     Source of Information patient;family     Communicated MARGARITO with patient/caregiver Yes     Reason For Admission Small bowel obstruction     People in Home spouse     Do you expect to return to your current living situation? Yes     Prior to hospitilization cognitive status: Alert/Oriented     Current cognitive status: Alert/Oriented     Walking or Climbing Stairs Difficulty no     Dressing/Bathing Difficulty no     Home Layout Able to live on 1st floor     Equipment Currently Used at Home none     Readmission within 30 days? No     Do you currently have service(s) that  help you manage your care at home? No     Do you have prescription coverage? Yes     Coverage Payor: AETNA - AETNA CHOICE POS -     Do you have any problems affording any of your prescribed medications? No     Is the patient taking medications as prescribed? yes     Who is going to help you get home at discharge?  Cyrus Castro     How do you get to doctors appointments? car, drives self;family or friend will provide     Are you on dialysis? No     Do you take coumadin? No     Discharge Plan A Home;Home with family     Transition of Care Barriers None        Physical Activity    On average, how many days per week do you engage in moderate to strenuous exercise (like a brisk walk)? 3 days     On average, how many minutes do you engage in exercise at this level? 30 min        Financial Resource Strain    How hard is it for you to pay for the very basics like food, housing, medical care, and heating? Not hard at all        Housing Stability    In the last 12 months, was there a time when you were not able to pay the mortgage or rent on time? No     At any time in the past 12 months, were you homeless or living in a shelter (including now)? No        Transportation Needs    Has the lack of transportation kept you from medical appointments, meetings, work or from getting things needed for daily living? No        Food Insecurity    Within the past 12 months, you worried that your food would run out before you got the money to buy more. Never true     Within the past 12 months, the food you bought just didn't last and you didn't have money to get more. Never true        Stress    Do you feel stress - tense, restless, nervous, or anxious, or unable to sleep at night because your mind is troubled all the time - these days? Not at all        Social Isolation    How often do you feel lonely or isolated from those around you?  Never        Alcohol Use    Q1: How often do you have a drink containing alcohol? Never     Q2:  How many drinks containing alcohol do you have on a typical day when you are drinking? Patient does not drink     Q3: How often do you have six or more drinks on one occasion? Never        Utilities    In the past 12 months has the electric, gas, oil, or water company threatened to shut off services in your home? No        Health Literacy    How often do you need to have someone help you when you read instructions, pamphlets, or other written material from your doctor or pharmacy? Rarely                      This SW met with patient and  at bedside. DPA. Questions answered / contact numbers provided.  Use PREFERRED PHARMACY / BEDSIDE DELIVERY for any necessary medications at time of discharge. Patient is independent with all ADLs - does not use DME, In-home equipment, is not on HD, BTs or home oxygen.Patient   will be assisting with help upon discharge. Patient  will be providing transportation home. Will continue to follow for course of hospitalization.       Discharge Plan A and Plan B have been determined by review of patient's clinical status, future medical and therapeutic needs, and coverage/benefits for post-acute care in coordination with multidisciplinary team members.    Deepthi Norton LCSW  [facility]

## 2024-05-16 NOTE — PLAN OF CARE
POC:    - received via stretcher;   -AOx4  - PIV x1 20G Right ACV  - R ostomy with dark green liquid output  - LLQ Jpx1 SS  - NG Tube Left Nare.  - Dr. Nunez at bedside; NG tube to LWIS as ordered.  - Skin assessment done with OBI Sharma  - IV fluids D5NS + KCL 20meqs at 75ml/hr  - call light within reach.  0530H- 1L LR bolus given.      Problem: Adult Inpatient Plan of Care  Goal: Plan of Care Review  Outcome: Progressing  Goal: Patient-Specific Goal (Individualized)  Outcome: Progressing  Goal: Absence of Hospital-Acquired Illness or Injury  Outcome: Progressing  Goal: Optimal Comfort and Wellbeing  Outcome: Progressing  Goal: Readiness for Transition of Care  Outcome: Progressing     Problem: Wound  Goal: Optimal Coping  Outcome: Progressing  Goal: Optimal Functional Ability  Outcome: Progressing

## 2024-05-16 NOTE — PROGRESS NOTES
[facility]  Colorectal Surgery  Progress Note    Patient Name: Collette Castro  MRN: [id number]  Admission Date: 5/15/2024  Hospital Length of Stay: 1 days  Attending Physician: SANTIAGO Paris MD    Subjective:     Interval History: no acute events overnite, NGT dced, ileostomy small amt of output, no n/v    Post-Op Info:  * No surgery found *          Medications:  Continuous Infusions:   dextrose 5 % and 0.9 % NaCl with KCl 20 mEq   Intravenous Continuous 75 mL/hr at 05/16/24 0754 Rate Verify at 05/16/24 0754     Scheduled Meds:   acetaminophen  650 mg Oral Q6H    enoxparin  40 mg Subcutaneous Daily    methocarbamol (ROBAXIN) IVPB  1,000 mg Intravenous Q8H     PRN Meds:   acetaminophen tablet 650 mg    enoxaparin injection 40 mg    methocarbamoL (ROBAXIN) 1,000 mg in dextrose 5 % (D5W) 100 mL IVPB        Objective:     Vital Signs (Most Recent):  Temp: 98.5 °F (36.9 °C) (05/16/24 1119)  Pulse: 95 (05/16/24 1119)  Resp: 18 (05/16/24 1119)  BP: (!) 102/58 (05/16/24 1119)  SpO2: 95 % (05/16/24 1119) Vital Signs (24h Range):  Temp:  [98.1 °F (36.7 °C)-98.5 °F (36.9 °C)] 98.5 °F (36.9 °C)  Pulse:  [89-98] 95  Resp:  [16-18] 18  SpO2:  [93 %-99 %] 95 %  BP: ()/(55-74) 102/58     Intake/Output - Last 3 Shifts         05/14 0700  05/15 0659 05/15 0700  05/16 0659 05/16 0700  05/17 0659    I.V. (mL/kg)  400.7 (5.4) 88.7 (1.2)    IV Piggyback  524.5 662.3    Total Intake(mL/kg)  925.2 (12.5) 751 (10.2)    Drains  580     Stool  200     Total Output  780     Net  +145.2 +751                    Physical Exam  Vitals and nursing note reviewed.   Constitutional:       Appearance: She is well-developed.   HENT:      Head: Normocephalic.   Eyes:      Pupils: Pupils are equal, round, and reactive to light.   Cardiovascular:      Rate and Rhythm: Normal rate and regular rhythm.      Heart sounds: Normal heart sounds.   Pulmonary:      Effort: Pulmonary effort is normal. No respiratory distress.      Breath sounds:  Normal breath sounds. No wheezing or rales.   Abdominal:      Palpations: Abdomen is soft. There is no mass.      Tenderness: There is no guarding or rebound.      Comments: Abd inc line healing well  Ileosotmy thin output   Skin:     General: Skin is warm and dry.   Neurological:      Mental Status: She is alert and oriented to person, place, and time.                Significant Labs:  BMP (Last 3 Results):   Recent Labs   Lab 05/12/24  0429 05/12/24  1535 05/13/24  0501 05/15/24  2302 05/16/24  0704    115* 106 107 120*    140 141 137 139   K 4.1 4.5 4.5 4.2 4.1   * 111* 114* 106 107   CO2 22* 21* 20* 21* 25   BUN 9 6 11 6 7   CREATININE 0.8 0.8 0.8 0.8 0.7   CALCIUM 8.5* 9.1 9.0 8.4* 8.1*   MG 1.6 1.5* 1.4*  --   --      CBC (Last 3 Results):   Recent Labs   Lab 05/13/24  0501 05/15/24  2301 05/16/24  0704   WBC 6.93 7.41 6.19   RBC 3.03* 3.32* 2.89*   HGB 9.7* 10.6* 9.2*   HCT 30.7* 31.1* 28.7*    297 341   * 94 99*   MCH 32.0* 31.9* 31.8*   MCHC 31.6* 34.1 32.1       Significant Diagnostics:  None  Assessment/Plan:     * Small bowel obstruction  48yo female who recently underwent robotic converted to open sigmoidectomy with DLI for sigmoid colon cancer presents with symptoms of abdominal bloating, pain and and nausea. Workup including CT scan and labs are consistent with sbo vs ileus. Suspect treatment with imodium which was initially for high output ileostomy may have caused decreased intestinal motility. Small bowel is dilated up to level of ileostomy. She has been treated at OSH awaiting transfer with NG tube and bowel rest and her symptoms have now been improving. She is now having ileostomy output.     -dc NGT  Clear lq diet  Dc immoidum  Accurate I&O including ostomy          Natalya Rivera NP  Colorectal Surgery  [facility]

## 2024-05-16 NOTE — NURSING
.Nurses Note -- 4 Eyes      5/16/2024   1:57 AM      Skin assessed during: Admit      [x] No Altered Skin Integrity Present    []Prevention Measures Documented      [] Yes- Altered Skin Integrity Present or Discovered   [] LDA Added if Not in Epic (Describe Wound)   [] New Altered Skin Integrity was Present on Admit and Documented in LDA   [] Wound Image Taken    Wound Care Consulted? No    Attending Nurse:  OBI Ramirez    Second RN/Staff Member:   OBI Sharma

## 2024-05-16 NOTE — ASSESSMENT & PLAN NOTE
50yo female who recently underwent robotic converted to open sigmoidectomy with DLI for sigmoid colon cancer presents with symptoms of abdominal bloating, pain and and nausea. Workup including CT scan and labs are consistent with sbo vs ileus. Suspect treatment with imodium which was initially for high output ileostomy may have caused decreased intestinal motility. Small bowel is dilated up to level of ileostomy. She has been treated at OSH awaiting transfer with NG tube and bowel rest and her symptoms have now been improving. She is now having ileostomy output.     -dc NGT  Clear lq diet  Dc immoidum  Accurate I&O including ostomy

## 2024-05-17 LAB
ANION GAP SERPL CALC-SCNC: 6 MMOL/L (ref 8–16)
BASOPHILS # BLD AUTO: 0.05 K/UL (ref 0–0.2)
BASOPHILS NFR BLD: 1.1 % (ref 0–1.9)
BUN SERPL-MCNC: 5 MG/DL (ref 6–20)
CALCIUM SERPL-MCNC: 8.4 MG/DL (ref 8.7–10.5)
CHLORIDE SERPL-SCNC: 111 MMOL/L (ref 95–110)
CO2 SERPL-SCNC: 20 MMOL/L (ref 23–29)
CREAT SERPL-MCNC: 0.7 MG/DL (ref 0.5–1.4)
CRP SERPL-MCNC: 17.2 MG/L (ref 0–8.2)
DIFFERENTIAL METHOD BLD: ABNORMAL
EOSINOPHIL # BLD AUTO: 0.3 K/UL (ref 0–0.5)
EOSINOPHIL NFR BLD: 5.9 % (ref 0–8)
ERYTHROCYTE [DISTWIDTH] IN BLOOD BY AUTOMATED COUNT: 12.8 % (ref 11.5–14.5)
EST. GFR  (NO RACE VARIABLE): >60 ML/MIN/1.73 M^2
GLUCOSE SERPL-MCNC: 131 MG/DL (ref 70–110)
HCT VFR BLD AUTO: 27.8 % (ref 37–48.5)
HGB BLD-MCNC: 9 G/DL (ref 12–16)
IMM GRANULOCYTES # BLD AUTO: 0.02 K/UL (ref 0–0.04)
IMM GRANULOCYTES NFR BLD AUTO: 0.4 % (ref 0–0.5)
LYMPHOCYTES # BLD AUTO: 0.7 K/UL (ref 1–4.8)
LYMPHOCYTES NFR BLD: 15.2 % (ref 18–48)
MCH RBC QN AUTO: 32.1 PG (ref 27–31)
MCHC RBC AUTO-ENTMCNC: 32.4 G/DL (ref 32–36)
MCV RBC AUTO: 99 FL (ref 82–98)
MONOCYTES # BLD AUTO: 0.6 K/UL (ref 0.3–1)
MONOCYTES NFR BLD: 12.3 % (ref 4–15)
NEUTROPHILS # BLD AUTO: 3 K/UL (ref 1.8–7.7)
NEUTROPHILS NFR BLD: 65.1 % (ref 38–73)
NRBC BLD-RTO: 0 /100 WBC
PLATELET # BLD AUTO: 306 K/UL (ref 150–450)
PMV BLD AUTO: 9.5 FL (ref 9.2–12.9)
POTASSIUM SERPL-SCNC: 4 MMOL/L (ref 3.5–5.1)
RBC # BLD AUTO: 2.8 M/UL (ref 4–5.4)
SODIUM SERPL-SCNC: 137 MMOL/L (ref 136–145)
WBC # BLD AUTO: 4.54 K/UL (ref 3.9–12.7)

## 2024-05-17 PROCEDURE — 86140 C-REACTIVE PROTEIN: CPT | Performed by: STUDENT IN AN ORGANIZED HEALTH CARE EDUCATION/TRAINING PROGRAM

## 2024-05-17 PROCEDURE — 36415 COLL VENOUS BLD VENIPUNCTURE: CPT | Performed by: STUDENT IN AN ORGANIZED HEALTH CARE EDUCATION/TRAINING PROGRAM

## 2024-05-17 PROCEDURE — 25000003 PHARM REV CODE 250: Performed by: STUDENT IN AN ORGANIZED HEALTH CARE EDUCATION/TRAINING PROGRAM

## 2024-05-17 PROCEDURE — 85025 COMPLETE CBC W/AUTO DIFF WBC: CPT | Performed by: STUDENT IN AN ORGANIZED HEALTH CARE EDUCATION/TRAINING PROGRAM

## 2024-05-17 PROCEDURE — 63600175 PHARM REV CODE 636 W HCPCS: Performed by: STUDENT IN AN ORGANIZED HEALTH CARE EDUCATION/TRAINING PROGRAM

## 2024-05-17 PROCEDURE — 80048 BASIC METABOLIC PNL TOTAL CA: CPT | Performed by: STUDENT IN AN ORGANIZED HEALTH CARE EDUCATION/TRAINING PROGRAM

## 2024-05-17 PROCEDURE — 20600001 HC STEP DOWN PRIVATE ROOM

## 2024-05-17 RX ADMIN — ACETAMINOPHEN 650 MG: 325 TABLET ORAL at 05:05

## 2024-05-17 RX ADMIN — SODIUM CHLORIDE, POTASSIUM CHLORIDE, SODIUM LACTATE AND CALCIUM CHLORIDE 1000 ML: 600; 310; 30; 20 INJECTION, SOLUTION INTRAVENOUS at 08:05

## 2024-05-17 RX ADMIN — METHOCARBAMOL 1000 MG: 100 INJECTION, SOLUTION INTRAMUSCULAR; INTRAVENOUS at 02:05

## 2024-05-17 RX ADMIN — ACETAMINOPHEN 650 MG: 325 TABLET ORAL at 11:05

## 2024-05-17 RX ADMIN — DEXTROSE MONOHYDRATE, SODIUM CHLORIDE, AND POTASSIUM CHLORIDE: 50; 9; 1.49 INJECTION, SOLUTION INTRAVENOUS at 09:05

## 2024-05-17 RX ADMIN — ENOXAPARIN SODIUM 40 MG: 40 INJECTION SUBCUTANEOUS at 05:05

## 2024-05-17 RX ADMIN — METHOCARBAMOL 1000 MG: 100 INJECTION, SOLUTION INTRAMUSCULAR; INTRAVENOUS at 05:05

## 2024-05-17 RX ADMIN — ACETAMINOPHEN 650 MG: 325 TABLET ORAL at 12:05

## 2024-05-17 NOTE — PROGRESS NOTES
[facility]  Colorectal Surgery  Progress Note    Patient Name: Collette Castro  MRN: [id number]  Admission Date: 5/15/2024  Hospital Length of Stay: 2 days  Attending Physician: SANTIAGO Paris MD    Subjective:     Interval History: Pt doing well, tolerating diet, not needing pain meds.     Post-Op Info:  * No surgery found *          Medications:  Continuous Infusions:   dextrose 5 % and 0.9 % NaCl with KCl 20 mEq   Intravenous Continuous 75 mL/hr at 05/17/24 0929 New Bag at 05/17/24 0929     Scheduled Meds:   acetaminophen  650 mg Oral Q6H    enoxparin  40 mg Subcutaneous Daily     PRN Meds:   acetaminophen tablet 650 mg    enoxaparin injection 40 mg        Objective:     Vital Signs (Most Recent):  Temp: 98.6 °F (37 °C) (05/17/24 1207)  Pulse: 92 (05/17/24 1207)  Resp: 18 (05/17/24 1207)  BP: 110/65 (05/17/24 1207)  SpO2: 97 % (05/17/24 1207) Vital Signs (24h Range):  Temp:  [97.9 °F (36.6 °C)-99.5 °F (37.5 °C)] 98.6 °F (37 °C)  Pulse:  [79-94] 92  Resp:  [18] 18  SpO2:  [96 %-98 %] 97 %  BP: ()/(54-65) 110/65     Intake/Output - Last 3 Shifts         05/15 0700  05/16 0659 05/16 0700  05/17 0659 05/17 0700  05/18 0659    P.O.  200     I.V. (mL/kg) 400.7 (5.4) 1467 (19.9)     IV Piggyback 524.5 956.7     Total Intake(mL/kg) 925.2 (12.5) 2623.7 (35.6)     Drains 580 420     Stool 200 200 450    Total Output 780 620 450    Net +145.2 +2003.7 -450           Urine Occurrence  1 x     Stool Occurrence  0 x              Physical Exam  Vitals and nursing note reviewed.   Constitutional:       Appearance: Normal appearance. She is well-developed.   HENT:      Head: Normocephalic and atraumatic.      Nose: Nose normal.   Eyes:      Conjunctiva/sclera: Conjunctivae normal.   Cardiovascular:      Rate and Rhythm: Normal rate and regular rhythm.      Heart sounds: Normal heart sounds.   Pulmonary:      Effort: Pulmonary effort is normal.   Abdominal:      Palpations: Abdomen is soft.      Tenderness: There is no  abdominal tenderness.      Comments: RLQ Colostomy w stool; LLQ MANDEEP   Musculoskeletal:         General: Normal range of motion.      Cervical back: Normal range of motion and neck supple.   Skin:     General: Skin is warm and dry.      Capillary Refill: Capillary refill takes less than 2 seconds.   Neurological:      Mental Status: She is alert and oriented to person, place, and time.   Psychiatric:         Behavior: Behavior normal.         Judgment: Judgment normal.            Significant Labs:  BMP:   Recent Labs   Lab 05/13/24  0501 05/15/24  2302 05/17/24  0436      < > 131*      < > 137   K 4.5   < > 4.0   *   < > 111*   CO2 20*   < > 20*   BUN 11   < > 5*   CREATININE 0.8   < > 0.7   CALCIUM 9.0   < > 8.4*   MG 1.4*  --   --     < > = values in this interval not displayed.     CBC:   Recent Labs   Lab 05/17/24  0436   WBC 4.54   RBC 2.80*   HGB 9.0*   HCT 27.8*      MCV 99*   MCH 32.1*   MCHC 32.4     CRP (Last 3 Results):   Recent Labs   Lab 05/13/24  0501 05/16/24  0704 05/17/24  0436   CRP 33.0* 24.4* 17.2*       Significant Diagnostics:  None  Assessment/Plan:     * Small bowel obstruction  48yo female who recently underwent robotic converted to open sigmoidectomy with DLI for sigmoid colon cancer presents with symptoms of abdominal bloating, pain and and nausea. Workup including CT scan and labs are consistent with sbo vs ileus. Suspect treatment with imodium which was initially for high output ileostomy may have caused decreased intestinal motility. Small bowel is dilated up to level of ileostomy. She has been treated at OSH awaiting transfer with NG tube and bowel rest and her symptoms have now been improving. She is now having ileostomy output.     -LRD  - Accurate I&O including ostomy          Heide Rendon NP  Colorectal Surgery  [facility]

## 2024-05-17 NOTE — ASSESSMENT & PLAN NOTE
50yo female who recently underwent robotic converted to open sigmoidectomy with DLI for sigmoid colon cancer presents with symptoms of abdominal bloating, pain and and nausea. Workup including CT scan and labs are consistent with sbo vs ileus. Suspect treatment with imodium which was initially for high output ileostomy may have caused decreased intestinal motility. Small bowel is dilated up to level of ileostomy. She has been treated at OSH awaiting transfer with NG tube and bowel rest and her symptoms have now been improving. She is now having ileostomy output.     -LRD  - Accurate I&O including ostomy

## 2024-05-17 NOTE — PLAN OF CARE
"[facility] Plan of Care Note    Dx: SBO (small bowel obstruction) [K56.609]    Shift Events: NG removed    Goals of Care: Pain Mgmt, N/V mgmt    Neuro: AOx4    Vital Signs: BP (!) 101/58 (BP Location: Left arm, Patient Position: Lying)   Pulse 94   Temp 98.9 °F (37.2 °C) (Oral)   Resp 18   Ht 5' 10" (1.778 m)   Wt 73.8 kg (162 lb 11.2 oz)   SpO2 98%   Breastfeeding No   BMI 23.34 kg/m²     Respiratory: RA    Diet: Diet Clear Liquid      Is patient tolerating current diet? Yes    GTTS: D5 0.9% NACL 20K+ @ 75    Urine Output/Bowel Movement:     Intake/Output Summary (Last 24 hours) at 5/16/2024 1952  Last data filed at 5/16/2024 1130  Gross per 24 hour   Intake 1676.15 ml   Output 1180 ml   Net 496.15 ml          Drains/Tubes/Tube Feeds (include total output/shift):   Output by Drain (mL) 05/14/24 0701 - 05/14/24 1900 05/14/24 1901 - 05/15/24 0700 05/15/24 0701 - 05/15/24 1900 05/15/24 1901 - 05/16/24 0700 05/16/24 0701 - 05/16/24 1900 05/16/24 1901 - 05/16/24 1952        Closed/Suction Drain 05/09/24 1344 Tube - 1 Left LLQ Bulb 19 Fr.    80            Lines: PIV x1      Accuchecks:NA    Skin: surgical sites    Fall Risk Score: see flowsheets    Activity level? Independent    Any scheduled procedures? NA    Any safety concerns? falls    Other: NA   "

## 2024-05-17 NOTE — NURSING
.[facility] Plan of Care Note  Dx : Small bowel obstruction     Shift Events: No complaints during shift.   PIV changed.    Goals of Care: Advance diet    Neuro: AOX4    Vital Signs:  WDL    Respiratory: RA    Diet: CLD    Is patient tolerating current diet? Yes    GTTS: D5NS 20meqs KCL at 75ml/hr    Urine Output/Bowel Movement:  Adequate    Drains/Tubes/Tube Feeds (include total output/shift): RLQ Colostomy; LLQ JPx1    Lines: 22G R ACV      Accuchecks: None    Skin:  intact except midline and transverse abdominal incision     Fall Risk Score: 5    Activity level? Independent    Any scheduled procedures? None    Any safety concerns? None    Other: None

## 2024-05-17 NOTE — SUBJECTIVE & OBJECTIVE
Subjective:     Interval History: Pt doing well, tolerating diet, not needing pain meds.     Post-Op Info:  * No surgery found *          Medications:  Continuous Infusions:   dextrose 5 % and 0.9 % NaCl with KCl 20 mEq   Intravenous Continuous 75 mL/hr at 05/17/24 0929 New Bag at 05/17/24 0929     Scheduled Meds:   acetaminophen  650 mg Oral Q6H    enoxparin  40 mg Subcutaneous Daily     PRN Meds:   acetaminophen tablet 650 mg    enoxaparin injection 40 mg        Objective:     Vital Signs (Most Recent):  Temp: 98.6 °F (37 °C) (05/17/24 1207)  Pulse: 92 (05/17/24 1207)  Resp: 18 (05/17/24 1207)  BP: 110/65 (05/17/24 1207)  SpO2: 97 % (05/17/24 1207) Vital Signs (24h Range):  Temp:  [97.9 °F (36.6 °C)-99.5 °F (37.5 °C)] 98.6 °F (37 °C)  Pulse:  [79-94] 92  Resp:  [18] 18  SpO2:  [96 %-98 %] 97 %  BP: ()/(54-65) 110/65     Intake/Output - Last 3 Shifts         05/15 0700  05/16 0659 05/16 0700  05/17 0659 05/17 0700  05/18 0659    P.O.  200     I.V. (mL/kg) 400.7 (5.4) 1467 (19.9)     IV Piggyback 524.5 956.7     Total Intake(mL/kg) 925.2 (12.5) 2623.7 (35.6)     Drains 580 420     Stool 200 200 450    Total Output 780 620 450    Net +145.2 +2003.7 -450           Urine Occurrence  1 x     Stool Occurrence  0 x              Physical Exam  Vitals and nursing note reviewed.   Constitutional:       Appearance: Normal appearance. She is well-developed.   HENT:      Head: Normocephalic and atraumatic.      Nose: Nose normal.   Eyes:      Conjunctiva/sclera: Conjunctivae normal.   Cardiovascular:      Rate and Rhythm: Normal rate and regular rhythm.      Heart sounds: Normal heart sounds.   Pulmonary:      Effort: Pulmonary effort is normal.   Abdominal:      Palpations: Abdomen is soft.      Tenderness: There is no abdominal tenderness.      Comments: RLQ Colostomy w stool; LLQ MANDEEP   Musculoskeletal:         General: Normal range of motion.      Cervical back: Normal range of motion and neck supple.   Skin:      General: Skin is warm and dry.      Capillary Refill: Capillary refill takes less than 2 seconds.   Neurological:      Mental Status: She is alert and oriented to person, place, and time.   Psychiatric:         Behavior: Behavior normal.         Judgment: Judgment normal.            Significant Labs:  BMP:   Recent Labs   Lab 05/13/24  0501 05/15/24  2302 05/17/24  0436      < > 131*      < > 137   K 4.5   < > 4.0   *   < > 111*   CO2 20*   < > 20*   BUN 11   < > 5*   CREATININE 0.8   < > 0.7   CALCIUM 9.0   < > 8.4*   MG 1.4*  --   --     < > = values in this interval not displayed.     CBC:   Recent Labs   Lab 05/17/24 0436   WBC 4.54   RBC 2.80*   HGB 9.0*   HCT 27.8*      MCV 99*   MCH 32.1*   MCHC 32.4     CRP (Last 3 Results):   Recent Labs   Lab 05/13/24  0501 05/16/24  0704 05/17/24  0436   CRP 33.0* 24.4* 17.2*       Significant Diagnostics:  None

## 2024-05-17 NOTE — PLAN OF CARE
"[facility] Plan of Care Note    Dx: SBO (small bowel obstruction) [K56.609]    Shift Events: NG removed    Goals of Care: Pain Mgmt, N/V mgmt    Neuro: AOx4    Vital Signs: BP (!) 101/58 (BP Location: Left arm, Patient Position: Lying)   Pulse 94   Temp 98.9 °F (37.2 °C) (Oral)   Resp 18   Ht 5' 10" (1.778 m)   Wt 73.8 kg (162 lb 11.2 oz)   SpO2 98%   Breastfeeding No   BMI 23.34 kg/m²     Respiratory: RA    Diet: Diet Clear Liquid      Is patient tolerating current diet? Yes    GTTS:  D5 0.9%NACL 20K+    Urine Output/Bowel Movement:     Intake/Output Summary (Last 24 hours) at 5/16/2024 1955  Last data filed at 5/16/2024 1130  Gross per 24 hour   Intake 1676.15 ml   Output 1180 ml   Net 496.15 ml          Drains/Tubes/Tube Feeds (include total output/shift):   Output by Drain (mL) 05/14/24 0701 - 05/14/24 1900 05/14/24 1901 - 05/15/24 0700 05/15/24 0701 - 05/15/24 1900 05/15/24 1901 - 05/16/24 0700 05/16/24 0701 - 05/16/24 1900 05/16/24 1901 - 05/16/24 1955        Closed/Suction Drain 05/09/24 1344 Tube - 1 Left LLQ Bulb 19 Fr.    80            Lines: PIV x1      Accuchecks:NA    Skin: surgical sites    Fall Risk Score: see flowsheets    Activity level? Independent    Any scheduled procedures? NA    Any safety concerns? falls    Other: NA   "

## 2024-05-17 NOTE — PLAN OF CARE
05/17/24 1606   Discharge Reassessment   Assessment Type Discharge Planning Brief Assessment   Did the patient's condition or plan change since previous assessment? No   Discharge Plan discussed with: Patient   Communicated MARGARITO with patient/caregiver Yes   Discharge Plan A Home with family   DME Needed Upon Discharge  none   Transition of Care Barriers None   Why the patient remains in the hospital Requires continued medical care   Post-Acute Status   Hospital Resources/Appts/Education Provided Provided patient/caregiver with written discharge plan information   Patient choice form signed by patient/caregiver List with quality metrics by geographic area provided   Discharge Delays None known at this time         Pt not ready for discharge due to: not medically ready  SW will remain available for families in [facility].  Currently pt has d/c plans in progress at this time.    Discharge Plan A and Plan B have been determined by review of patient's clinical status, future medical and therapeutic needs, and coverage/benefits for post-acute care in coordination with multidisciplinary team members.     Herlinda Medellin LCSW  Case Management/[facility]  [phone]

## 2024-05-18 VITALS
RESPIRATION RATE: 18 BRPM | OXYGEN SATURATION: 99 % | WEIGHT: 162.69 LBS | BODY MASS INDEX: 23.29 KG/M2 | SYSTOLIC BLOOD PRESSURE: 95 MMHG | HEIGHT: 70 IN | DIASTOLIC BLOOD PRESSURE: 58 MMHG | HEART RATE: 79 BPM | TEMPERATURE: 98 F

## 2024-05-18 PROBLEM — K56.609 SMALL BOWEL OBSTRUCTION: Status: RESOLVED | Noted: 2024-05-15 | Resolved: 2024-05-18

## 2024-05-18 LAB
ANION GAP SERPL CALC-SCNC: 8 MMOL/L (ref 8–16)
BUN SERPL-MCNC: 5 MG/DL (ref 6–20)
CALCIUM SERPL-MCNC: 8.6 MG/DL (ref 8.7–10.5)
CHLORIDE SERPL-SCNC: 108 MMOL/L (ref 95–110)
CO2 SERPL-SCNC: 21 MMOL/L (ref 23–29)
CREAT SERPL-MCNC: 0.8 MG/DL (ref 0.5–1.4)
EST. GFR  (NO RACE VARIABLE): >60 ML/MIN/1.73 M^2
GLUCOSE SERPL-MCNC: 106 MG/DL (ref 70–110)
POTASSIUM SERPL-SCNC: 3.8 MMOL/L (ref 3.5–5.1)
SODIUM SERPL-SCNC: 137 MMOL/L (ref 136–145)

## 2024-05-18 PROCEDURE — 36415 COLL VENOUS BLD VENIPUNCTURE: CPT | Performed by: STUDENT IN AN ORGANIZED HEALTH CARE EDUCATION/TRAINING PROGRAM

## 2024-05-18 PROCEDURE — 80048 BASIC METABOLIC PNL TOTAL CA: CPT | Performed by: STUDENT IN AN ORGANIZED HEALTH CARE EDUCATION/TRAINING PROGRAM

## 2024-05-18 PROCEDURE — 25000003 PHARM REV CODE 250: Performed by: STUDENT IN AN ORGANIZED HEALTH CARE EDUCATION/TRAINING PROGRAM

## 2024-05-18 RX ORDER — LOPERAMIDE HYDROCHLORIDE 2 MG/1
2 CAPSULE ORAL 4 TIMES DAILY PRN
Qty: 160 CAPSULE | Refills: 2 | Status: SHIPPED | OUTPATIENT
Start: 2024-05-18

## 2024-05-18 RX ADMIN — ACETAMINOPHEN 650 MG: 325 TABLET ORAL at 07:05

## 2024-05-18 NOTE — DISCHARGE SUMMARY
[facility]  Colorectal Surgery  Discharge Summary      Patient Name: Collette Castro  MRN: [id number]  Admission Date: 5/15/2024  Hospital Length of Stay: 3 days  Discharge Date and Time:  05/18/2024 9:11 AM  Attending Physician: SANTIAGO Paris MD   Discharging Provider: Sincere Madsen MD  Primary Care Provider: Paulette Boone FNP     HPI:  No notes on file    * No surgery found *     Hospital Course:  Ms. Castro is a 48yo female who recently underwent LAR with DLI for sigmoid colon cancer who presented with decreased ileostomy output concerning for sbo vs ileus. Her imodium from preivous hospitalization was stopped and she was treated with bowel rest and NG decompression. She had return of bowel function with 1.5L of ileostomy output daily, she tolerated oral intake and pain was controlled. She was discharged home with plans for IVF outpatient as needed and to restart imodium prn for very high thin output form her ostomy.     Goals of Care Treatment Preferences:  Code Status: Full Code          Significant Diagnostic Studies: N/A    Pending Diagnostic Studies:       None          Final Active Diagnoses:      Problems Resolved During this Admission:    Diagnosis Date Noted Date Resolved POA    PRINCIPAL PROBLEM:  Small bowel obstruction [K56.609] 05/15/2024 05/18/2024 Yes      Discharged Condition: good    Disposition: Home or Self Care    Follow Up:    Patient Instructions:      Diet Adult Regular     Notify your health care provider if you experience any of the following:  temperature >100.4     Notify your health care provider if you experience any of the following:  persistent nausea and vomiting or diarrhea     Notify your health care provider if you experience any of the following:  severe uncontrolled pain     Activity as tolerated     Medications:  Reconciled Home Medications:      Medication List        CHANGE how you take these medications      loperamide 2 mg capsule  Commonly known as:  IMODIUM  Take 1 capsule (2 mg total) by mouth 4 (four) times daily as needed for Diarrhea (for large volume thin ileostomy output or <1.5L of ileostomy output).  What changed:   how much to take  when to take this  reasons to take this            CONTINUE taking these medications      acetaminophen 650 MG Tbsr  Commonly known as: TYLENOL  Take 1 tablet (650 mg total) by mouth every 8 (eight) hours.     ALPRAZolam 0.5 MG tablet  Commonly known as: XANAX  Take 0.5 mg by mouth 2 (two) times daily as needed.     ibuprofen 600 MG tablet  Commonly known as: ADVIL,MOTRIN  Take 1 tablet (600 mg total) by mouth 3 (three) times daily.     metFORMIN 500 MG ER 24hr tablet  Commonly known as: GLUCOPHAGE-XR  Take 500 mg by mouth 2 (two) times daily with meals.     methocarbamoL 500 MG Tab  Commonly known as: ROBAXIN  Take 1 tablet (500 mg total) by mouth 4 (four) times daily. for 10 days            STOP taking these medications      diphenoxylate-atropine 2.5-0.025 mg 2.5-0.025 mg per tablet  Commonly known as: LOMOTIL            ASK your doctor about these medications      nitrofurantoin (macrocrystal-monohydrate) 100 MG capsule  Commonly known as: MACROBID  Take 100 mg by mouth 2 (two) times daily.     VAGIFEM 10 mcg Tab  Generic drug: estradioL  Place vaginally.              Sincere Madsen MD  Colorectal Surgery  [facility]

## 2024-05-18 NOTE — PLAN OF CARE
"[facility] Plan of Care Note    Dx: SBO (small bowel obstruction) [K56.609]    Shift Events: MANDEEP removed,  Diet advanced,     Goals of Care: Colostomy care    Neuro: AOx4    Vital Signs: BP (!) 96/56 (BP Location: Left arm, Patient Position: Sitting)   Pulse 90   Temp 98.5 °F (36.9 °C) (Oral)   Resp 18   Ht 5' 10" (1.778 m)   Wt 73.8 kg (162 lb 11.2 oz)   SpO2 97%   Breastfeeding No   BMI 23.34 kg/m²     Respiratory: RA    Diet: Diet Low Fiber/Residue      Is patient tolerating current diet? Yes    GTTS: NA    Urine Output/Bowel Movement:     Intake/Output Summary (Last 24 hours) at 5/17/2024 1905  Last data filed at 5/17/2024 1742  Gross per 24 hour   Intake 1872.7 ml   Output 1470 ml   Net 402.7 ml          Drains/Tubes/Tube Feeds (include total output/shift):   Output by Drain (mL) 05/15/24 0701 - 05/15/24 1900 05/15/24 1901 - 05/16/24 0700 05/16/24 0701 - 05/16/24 1900 05/16/24 1901 - 05/17/24 0700 05/17/24 0701 - 05/17/24 1900 05/17/24 1901 - 05/17/24 1905   Requested LDAs do not have output data documented.          Lines: PIV x1      Accuchecks:NA    Skin: Surgical sites    Fall Risk Score: See flowsheets    Activity level? See flowsheets    Any scheduled procedures? NA    Any safety concerns? Falls    Other: NA   "

## 2024-05-18 NOTE — HOSPITAL COURSE
Ms. Castro is a 48yo female who recently underwent LAR with DLI for sigmoid colon cancer who presented with decreased ileostomy output concerning for sbo vs ileus. Her imodium from preivous hospitalization was stopped and she was treated with bowel rest and NG decompression. She had return of bowel function with 1.5L of ileostomy output daily, she tolerated oral intake and pain was controlled. She was discharged home with plans for IVF outpatient as needed and to restart imodium prn for very high thin output form her ostomy.

## 2024-05-18 NOTE — PLAN OF CARE
Problem: Adult Inpatient Plan of Care  Goal: Plan of Care Review  Outcome: Progressing  Goal: Patient-Specific Goal (Individualized)  Outcome: Progressing  Goal: Absence of Hospital-Acquired Illness or Injury  Outcome: Progressing  Goal: Optimal Comfort and Wellbeing  Outcome: Progressing  Goal: Readiness for Transition of Care  Outcome: Progressing     Problem: Wound  Goal: Optimal Coping  Outcome: Progressing  Goal: Optimal Functional Ability  Outcome: Progressing  Goal: Absence of Infection Signs and Symptoms  Outcome: Progressing  Goal: Improved Oral Intake  Outcome: Progressing  Goal: Optimal Pain Control and Function  Outcome: Progressing  Goal: Skin Health and Integrity  Outcome: Progressing  Goal: Optimal Wound Healing  Outcome: Progressing     Problem: Fall Injury Risk  Goal: Absence of Fall and Fall-Related Injury  Outcome: Progressing

## 2024-05-18 NOTE — PLAN OF CARE
Pt Aox4. VSS. No signs of respiratory distress on RA. All IV access removed. Pt refused DC meds. .Pt received and reviewed discharge instructions. Pt remained injury free through shift. Pt was taken downstairs via wheelchair. Pt discharge to home via private vehicle.

## 2024-05-18 NOTE — NURSING
.[facility] Plan of Care Note  Dx : Small bowel obstruction      Shift Events: Don't want to hook to IV fluids for now. MD Santa aware.     Goals of Care: monitor ostomy output and diet modification.     Neuro: AOX4     Vital Signs:  WDL     Respiratory: RA     Diet: Regular     Is patient tolerating current diet? Yes     GTTS: D5NS 20meqs KCL at 75ml/hr     Urine Output/Bowel Movement:  Adequate     Drains/Tubes/Tube Feeds (include total output/shift): RLQ Colostomy;      Lines: 22G R ACV      Accuchecks: None     Skin:  intact except midline and transverse abdominal incision      Fall Risk Score: 5     Activity level? Independent     Any scheduled procedures? None     Any safety concerns? None     Other: None

## 2024-05-20 ENCOUNTER — PATIENT MESSAGE (OUTPATIENT)
Dept: SURGERY | Facility: CLINIC | Age: 49
End: 2024-05-20
Payer: COMMERCIAL

## 2024-05-21 ENCOUNTER — DOCUMENTATION ONLY (OUTPATIENT)
Dept: HEMATOLOGY/ONCOLOGY | Facility: CLINIC | Age: 49
End: 2024-05-21
Payer: COMMERCIAL

## 2024-05-21 DIAGNOSIS — C18.9 METASTATIC COLON CANCER TO LIVER: ICD-10-CM

## 2024-05-21 DIAGNOSIS — C78.7 METASTATIC COLON CANCER TO LIVER: ICD-10-CM

## 2024-05-21 DIAGNOSIS — C18.7 MALIGNANT NEOPLASM OF SIGMOID COLON: Primary | ICD-10-CM

## 2024-05-21 LAB — POCT GLUCOSE: 109 MG/DL (ref 70–110)

## 2024-05-21 NOTE — PROGRESS NOTES
Reviewed chart. Per message from Dr. Paris reached out to  for home health company, nurse will have orders entered.  Spoke with [facility] (fax [phone]) will fax home health order, H&P, face sheet, Most recent office visit, and wound care orders today.

## 2024-05-22 ENCOUNTER — TELEPHONE (OUTPATIENT)
Dept: SURGERY | Facility: CLINIC | Age: 49
End: 2024-05-22
Payer: COMMERCIAL

## 2024-05-22 ENCOUNTER — PATIENT OUTREACH (OUTPATIENT)
Dept: ADMINISTRATIVE | Facility: CLINIC | Age: 49
End: 2024-05-22
Payer: COMMERCIAL

## 2024-05-22 NOTE — TELEPHONE ENCOUNTER
Returned Kenyatta's () call -- provided information regarding surgical site, ileostomy information for for  needs and supplies.      ----- Message from Melissa Aaron LPN sent at 5/22/2024 11:04 AM CDT -----    ----- Message -----  From: Bianca Jean Baptiste  Sent: 5/22/2024  10:05 AM CDT  To: [facility] Staff    Type: Needs Medical Advice  Who Called:  Kenyatta WATERS  Symptoms (please be specific):    How long has patient had these symptoms:    Pharmacy name and phone #:    Best Call Back Number:   Additional Information: Kenyatta is requesting a call back from [facility] regarding patient.

## 2024-05-22 NOTE — PROGRESS NOTES
C3 nurse spoke with Collette Castro for a TCC post hospital discharge follow up call. The patient reports does not have a scheduled HOSFU appointment. C3 nurse was unable to schedule HOSFU appointment for Non-[facility] PCP. Patient advised to contact their PCP to schedule a HOSPFU within 5-7 days.

## 2024-05-27 ENCOUNTER — LAB VISIT (OUTPATIENT)
Dept: LAB | Facility: HOSPITAL | Age: 49
End: 2024-05-27
Payer: COMMERCIAL

## 2024-05-27 ENCOUNTER — OFFICE VISIT (OUTPATIENT)
Dept: SURGERY | Facility: CLINIC | Age: 49
End: 2024-05-27
Payer: COMMERCIAL

## 2024-05-27 VITALS
WEIGHT: 148.13 LBS | SYSTOLIC BLOOD PRESSURE: 98 MMHG | BODY MASS INDEX: 21.21 KG/M2 | DIASTOLIC BLOOD PRESSURE: 60 MMHG | HEART RATE: 106 BPM | OXYGEN SATURATION: 97 % | HEIGHT: 70 IN | TEMPERATURE: 97 F

## 2024-05-27 DIAGNOSIS — C18.7 MALIGNANT NEOPLASM OF SIGMOID COLON: Primary | ICD-10-CM

## 2024-05-27 DIAGNOSIS — Z48.89 ENCOUNTER FOR POST SURGICAL WOUND CHECK: ICD-10-CM

## 2024-05-27 DIAGNOSIS — R53.83 FATIGUE, UNSPECIFIED TYPE: ICD-10-CM

## 2024-05-27 DIAGNOSIS — C18.7 MALIGNANT NEOPLASM OF SIGMOID COLON: ICD-10-CM

## 2024-05-27 LAB
ALBUMIN SERPL BCP-MCNC: 3.9 G/DL (ref 3.5–5.2)
ALP SERPL-CCNC: 229 U/L (ref 55–135)
ALT SERPL W/O P-5'-P-CCNC: 176 U/L (ref 10–44)
ANION GAP SERPL CALC-SCNC: 15 MMOL/L (ref 8–16)
AST SERPL-CCNC: 229 U/L (ref 10–40)
BASOPHILS # BLD AUTO: 0.08 K/UL (ref 0–0.2)
BASOPHILS NFR BLD: 0.9 % (ref 0–1.9)
BILIRUB SERPL-MCNC: 0.6 MG/DL (ref 0.1–1)
BUN SERPL-MCNC: 22 MG/DL (ref 6–20)
CA-I BLDV-SCNC: 1.12 MMOL/L (ref 1.06–1.42)
CALCIUM SERPL-MCNC: 10 MG/DL (ref 8.7–10.5)
CHLORIDE SERPL-SCNC: 96 MMOL/L (ref 95–110)
CO2 SERPL-SCNC: 22 MMOL/L (ref 23–29)
CREAT SERPL-MCNC: 1.3 MG/DL (ref 0.5–1.4)
DIFFERENTIAL METHOD BLD: ABNORMAL
EOSINOPHIL # BLD AUTO: 0.2 K/UL (ref 0–0.5)
EOSINOPHIL NFR BLD: 1.9 % (ref 0–8)
ERYTHROCYTE [DISTWIDTH] IN BLOOD BY AUTOMATED COUNT: 12.2 % (ref 11.5–14.5)
EST. GFR  (NO RACE VARIABLE): 50.4 ML/MIN/1.73 M^2
FINAL PATHOLOGIC DIAGNOSIS: NORMAL
GLUCOSE SERPL-MCNC: 112 MG/DL (ref 70–110)
GROSS: NORMAL
HCT VFR BLD AUTO: 37.7 % (ref 37–48.5)
HGB BLD-MCNC: 13.2 G/DL (ref 12–16)
IMM GRANULOCYTES # BLD AUTO: 0.02 K/UL (ref 0–0.04)
IMM GRANULOCYTES NFR BLD AUTO: 0.2 % (ref 0–0.5)
LYMPHOCYTES # BLD AUTO: 1 K/UL (ref 1–4.8)
LYMPHOCYTES NFR BLD: 11.2 % (ref 18–48)
Lab: NORMAL
MAGNESIUM SERPL-MCNC: 1.8 MG/DL (ref 1.6–2.6)
MCH RBC QN AUTO: 31.5 PG (ref 27–31)
MCHC RBC AUTO-ENTMCNC: 35 G/DL (ref 32–36)
MCV RBC AUTO: 90 FL (ref 82–98)
MONOCYTES # BLD AUTO: 0.8 K/UL (ref 0.3–1)
MONOCYTES NFR BLD: 9.1 % (ref 4–15)
NEUTROPHILS # BLD AUTO: 6.8 K/UL (ref 1.8–7.7)
NEUTROPHILS NFR BLD: 76.7 % (ref 38–73)
NRBC BLD-RTO: 0 /100 WBC
PHOSPHATE SERPL-MCNC: 3.9 MG/DL (ref 2.7–4.5)
PLATELET # BLD AUTO: 610 K/UL (ref 150–450)
PMV BLD AUTO: 9.1 FL (ref 9.2–12.9)
POTASSIUM SERPL-SCNC: 4.7 MMOL/L (ref 3.5–5.1)
PROT SERPL-MCNC: 8.5 G/DL (ref 6–8.4)
RBC # BLD AUTO: 4.19 M/UL (ref 4–5.4)
SODIUM SERPL-SCNC: 133 MMOL/L (ref 136–145)
WBC # BLD AUTO: 8.81 K/UL (ref 3.9–12.7)

## 2024-05-27 PROCEDURE — 99024 POSTOP FOLLOW-UP VISIT: CPT | Mod: S$GLB,,, | Performed by: COLON & RECTAL SURGERY

## 2024-05-27 PROCEDURE — 83735 ASSAY OF MAGNESIUM: CPT | Mod: PN

## 2024-05-27 PROCEDURE — 80053 COMPREHEN METABOLIC PANEL: CPT | Mod: PN

## 2024-05-27 PROCEDURE — 84100 ASSAY OF PHOSPHORUS: CPT | Mod: PN

## 2024-05-27 PROCEDURE — 99999 PR PBB SHADOW E&M-EST. PATIENT-LVL IV: CPT | Mod: PBBFAC,,, | Performed by: COLON & RECTAL SURGERY

## 2024-05-27 PROCEDURE — 85025 COMPLETE CBC W/AUTO DIFF WBC: CPT | Mod: PN

## 2024-05-27 PROCEDURE — 82330 ASSAY OF CALCIUM: CPT

## 2024-05-27 PROCEDURE — 36415 COLL VENOUS BLD VENIPUNCTURE: CPT | Mod: PN

## 2024-05-27 NOTE — PROGRESS NOTES
HPI:  Collette Castro is a 49 y.o. female with history of colon CA, sigmod     History of cervical CA.  Cisplatin and XRT.   Surgery - Treated with pinning of ovaries upper abd, lymphadenectomy and XRT (interstitial, EBRT?) in her late 20's     Asymptomatic  Screening colonoscopies.         12-  Colonoscopy - [facility]    Staging workup revealed liver metastases.     1- CT scan:    Multiple liver hypodensities  Paracolic gutter soft tissue densities (likely ovaries per pt)        1-  MRI abdomen          *4 cycles of chemoRX with reactions after 1st cycle - see Dr Mathias note:       *Oxaliplatin stopped.  QOW 5 FU       *Excellent response with only one visible lesion seen - decreased in size      4-  PET           4-  MRI abdomen       4-  Interval hx:    Here today for consideration of colorectal resection  No abd pain.  No rectal bleeding.  No wt loss.    Good activity level.   No CP after Oxaliplatin stopped.       4-  Interval hx:  Again remains asymptomatic.  No abdominal pain or bleeding.       5-9-2024    History of robotic, open LAR, DLI       Collected: 05/09/24 1454   Result status: Final   Resulting lab: [facility]   Value: 1. Sigmoid colon and portion of rectum (sigmoid colectomy, low anterior resection):  Invasive adenocarcinoma, status posttreatment.  See cancer synoptic report below    2. Distal rectal margin (excision):    No dysplasia, no malignancy.    3. Portion of mesorectum (excision):  Negative for tumor  Fibroconnective and adipose tissue    4. Portion of rectum (excision):    No dysplasia.  No carcinoma.  Benign rectum with reactive changes and erosion, extensive adhesions    5. Proximal anastomotic ring (excision):    No dysplasia.  No carcinoma.  Benign colonic mucosa    6. Distal anastomotic ring (excision):  No dysplasia.  No carcinoma benign rectal mucosa       Cancer synoptic report  Procedure:  Sigmoid  colectomy, low anterior resection  Tumor site:  Sigmoid  Histologic type: Adenocarcinoma  Histologic grade:  G2, moderately differentiated  Tumor size:  2.8 x 2.0 cm  Tumor extent:  Invades through muscularis propria into pericolonic tissue  Macroscopic tumor perforation: Not identified  Lymphatic and/or vascular invasion:  Present, small vessel  Perineural invasion: Not identified  Tumor budding score: Intermediate (8)  Treatment effect:  Present with residual cancer showing evident tumor regression but more than rare small groups (partial response, score 2)    Margins  All margins negative for dysplasia  All margins negative for invasive carcinoma.      pTNM Classification (AJCC 8th edition)  Modified classification: y(post-neoadjuvant therapy)    pT Category  pT3:  Tumor invades through the muscularis propria into pericolorectal tissues    pN Category  pN0:  No regional lymph node metastasis  Number of lymph nodes examined:  Twelve (12)  Tumor deposits: Not identified    pM Category  No tissue submitted.  Given history of synchronous liver metastasis with interval reduction/resolution per 04/10/2024 MRI.    Tumor blocks:  1C-I.  Best tumor block 1I    Ancillary studies:  Unknown if performed on prior outside material. Epic message sent to Chasity Mercer CNS (Hematology oncology).   Comment: Interp By Nora Barney M.D., Signed on 05/27/2024 at 14:46   *Additional information available - comment, narrative       5-   Interval hx:    SOB with exertion.  No CP  High output, loose  No N/V.  No abd pain.  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 Analyses with CancerNext-Expanded (77 genes)    Pre-diabetes     Sphincter of Oddi dysfunction         Past Surgical History:   Procedure Laterality Date    CHOLECYSTECTOMY      COLONOSCOPY      CYSTOSCOPY W/ URETERAL  STENT PLACEMENT Bilateral 5/9/2024    Procedure: CYSTOSCOPY, WITH URETERAL STENT INSERTION;  Surgeon: Cas Bang MD;  Location: [facility] OR [facility] FLR;  Service: Urology;  Laterality: Bilateral;    EXPLORATORY LAPAROTOMY      FLEXIBLE SIGMOIDOSCOPY N/A 5/9/2024    Procedure: SIGMOIDOSCOPY, FLEXIBLE;  Surgeon: SANTIAGO Paris MD;  Location: [facility] OR 2ND FLR;  Service: Colon and Rectal;  Laterality: N/A;    LOW ANTERIOR RESECTION OF COLON N/A 5/9/2024    Procedure: RESECTION, COLON, LOW ANTERIOR, robotic converted to open;  Surgeon: SANTIAGO Paris MD;  Location: [facility] OR 2ND FLR;  Service: Colon and Rectal;  Laterality: N/A;    LYSIS OF ADHESIONS  5/9/2024    Procedure: LYSIS, ADHESIONS;  Surgeon: SANTIAGO Paris MD;  Location: [facility] OR 2ND FLR;  Service: Colon and Rectal;;  extensive, greater  than 2 hours; 22 modifier    MOBILIZATION, SPLENIC FLEXURE, LAPAROSCOPIC  5/9/2024    Procedure: MOBILIZATION, SPLENIC FLEXURE, LAPAROSCOPIC;  Surgeon: SANTIAGO Paris MD;  Location: [facility] OR 2ND FLR;  Service: Colon and Rectal;;  22 modifier; greater than 2 hours    OVARY SURGERY      relocation    ROBOT-ASSISTED LAPAROSCOPIC RESECTION OF SIGMOID COLON USING DA ESTEFANI XI N/A 5/9/2024    Procedure: XI ROBOTIC COLECTOMY, SIGMOID;  Surgeon: SANTIAGO Paris MD;  Location: [facility] OR 2ND FLR;  Service: Colon and Rectal;  Laterality: N/A;  converted to open    thryoid nodule         Review of patient's allergies indicates:   Allergen Reactions    Codeine Other (See Comments)     Per patient, causes pancreatitis    Hydrocodone bitartrate     Hydrocodone-acetaminophen Other (See Comments)    Opioids - morphine analogues      Chest pain    Oxycodone      Pancreatitis w/ all oral narctotics    Tramadol hcl      pancreatitis       Family History   Problem Relation Name Age of Onset    Colon cancer Maternal Grandfather  67    Lymphoma Maternal Cousin      Lung cancer Other         Social History     Socioeconomic History    Marital  "status:    Tobacco Use    Smoking status: Never    Smokeless tobacco: Never   Substance and Sexual Activity    Alcohol use: Not Currently     Comment: once a month    Drug use: Never    Sexual activity: Yes     Partners: Male     Birth control/protection: See Surgical Hx     Social Determinants of Health     Financial Resource Strain: Low Risk  (5/16/2024)    Overall Financial Resource Strain (CARDIA)     Difficulty of Paying Living Expenses: Not hard at all   Food Insecurity: No Food Insecurity (5/16/2024)    Hunger Vital Sign     Worried About Running Out of Food in the Last Year: Never true     Ran Out of Food in the Last Year: Never true   Transportation Needs: No Transportation Needs (5/16/2024)    TRANSPORTATION NEEDS     Transportation : No   Physical Activity: Insufficiently Active (5/16/2024)    Exercise Vital Sign     Days of Exercise per Week: 3 days     Minutes of Exercise per Session: 30 min   Stress: No Stress Concern Present (5/16/2024)    [place] [employer] - Occupational Stress Questionnaire     Feeling of Stress : Not at all   Housing Stability: Low Risk  (5/16/2024)    Housing Stability Vital Sign     Unable to Pay for Housing in the Last Year: No     Homeless in the Last Year: No       ROS:  GENERAL: No fever, chills, fatigability or weight loss.  Integument: No rashes, redness, icterus  CHEST: Denies VILLARREAL, cyanosis, wheezing, cough and sputum production.  CARDIOVASCULAR: Denies chest pain, PND, orthopnea or reduced exercise tolerance.  GI: Denies abd pain, dysphagia, nausea, vomiting, no hematemesis   : Denies burning on urination, no hematuria, no bacteriuria  MSK: No deformities, swelling, joint pain swelling  Neurologic: No HAs, seizures, weakness, paresthesias, gait problems    PE:  General appearance well  BP 98/60 (BP Location: Right arm, Patient Position: Sitting, BP Method: Medium (Automatic))   Pulse 106   Temp 97.2 °F (36.2 °C) (Temporal)   Ht 5' 10" " (1.778 m)   Wt 67.2 kg (148 lb 2.4 oz)   SpO2 97%   BMI 21.26 kg/m²   Sclera/ Skin anicteric  LN none palpable  AT NC EOMI  Neck supple trachea midline   Chest symmetric, nl excursion, no retractions, breathing comfortably  Abdomen    Wound clean dry intact  ND soft NT.  no masses, no organomegaly  EXT - no CCE, no calf tenderness  Neuro:  Mood/ affect nl, alert and oriented x 3, moves all ext's, gait nl      Assessment:  Wound healed  Dehydration?  Stoma output high?    H/o metastatic colon cancer - liver metastases - for RFA of liver lesion      Plan:  Check labs  Imodium BID  OV 6 weeks

## 2024-05-28 ENCOUNTER — PATIENT MESSAGE (OUTPATIENT)
Dept: INTERVENTIONAL RADIOLOGY/VASCULAR | Facility: HOSPITAL | Age: 49
End: 2024-05-28
Payer: COMMERCIAL

## 2024-05-28 ENCOUNTER — TELEPHONE (OUTPATIENT)
Dept: INTERVENTIONAL RADIOLOGY/VASCULAR | Facility: CLINIC | Age: 49
End: 2024-05-28
Payer: COMMERCIAL

## 2024-05-28 ENCOUNTER — DOCUMENTATION ONLY (OUTPATIENT)
Dept: PREADMISSION TESTING | Facility: HOSPITAL | Age: 49
End: 2024-05-28
Payer: COMMERCIAL

## 2024-05-28 NOTE — NURSING
Called patient to review pre-procedure instructions for IR Ablation procedure on 5/30.  Patient states she started taking ASA 81mg yesterday and also took today. She states she started taking ASA because her platelet level was elevated. Patient does not have ASA listed on her med list. Until yesterday she has not taken ASA for months. Explained to patient that ASA is usually held for 5 days prior to procedure, I would send message to IR physicians to discuss if procedure needs to be rescheduled.  Discussed with  and Dr. Norton.  Patient to be rescheduled and ASA to be held prior to procedure.  Left voicemail for patient explaining procedure cancelled for 5/30/24, ASA must be held for 5 days and requesting call back to confirm receipt and understanding.  Message sent to IR schedulers Melissa & Lora asking patient to be rescheduled.

## 2024-05-29 NOTE — PROVATION PATIENT INSTRUCTIONS
Discharge Summary/Instructions after an Endoscopic Procedure  Patient Name: Collette Castro  Patient MRN: [id number]  Patient YOB: 1975 Wednesday, April 24, 2024  Emeka Paris MD  Dear patient,  As a result of recent federal legislation (The Federal Cures Act), you may   receive lab or pathology results from your procedure in your [facility]   account before your physician is able to contact you. Your physician or   their representative will relay the results to you with their   recommendations at their soonest availability.  Thank you,  RESTRICTIONS:  During your procedure today, you received medications for sedation.  These   medications may affect your judgment, balance and coordination.  Therefore,   for 24 hours, you have the following restrictions:   - DO NOT drive a car, operate machinery, make legal/financial decisions,   sign important papers or drink alcohol.    ACTIVITY:  Today: no heavy lifting, straining or running due to procedural   sedation/anesthesia.  The following day: return to full activity including work.  DIET:  Eat and drink normally unless instructed otherwise.     TREATMENT FOR COMMON SIDE EFFECTS:  - Mild abdominal pain, nausea, belching, bloating or excessive gas:  rest,   eat lightly and use a heating pad.  - Sore Throat: treat with throat lozenges and/or gargle with warm salt   water.  - Because air was used during the procedure, expelling large amounts of air   from your rectum or belching is normal.  - If a bowel prep was taken, you may not have a bowel movement for 1-3 days.    This is normal.  SYMPTOMS TO WATCH FOR AND REPORT TO YOUR PHYSICIAN:  1. Abdominal pain or bloating, other than gas cramps.  2. Chest pain.  3. Back pain.  4. Signs of infection such as: chills or fever occurring within 24 hours   after the procedure.  5. Rectal bleeding, which would show as bright red, maroon, or black stools.   (A tablespoon of blood from the rectum is not serious, especially if    hemorrhoids are present.)  6. Vomiting.  7. Weakness or dizziness.  GO DIRECTLY TO THE NEAREST EMERGENCY ROOM IF YOU HAVE ANY OF THE FOLLOWING:      Difficulty breathing              Chills and/or fever over 101 F   Persistent vomiting and/or vomiting blood   Severe abdominal pain   Severe chest pain   Black, tarry stools   Bleeding- more than one tablespoon   Any other symptom or condition that you feel may need urgent attention  Your doctor recommends these additional instructions:  If any biopsies were taken, your doctors clinic will contact you in 1 to 2   weeks with any results.  - Discharge patient to home (ambulatory).   - Resume previous diet.   - Continue present medications.   - Repeat flexible sigmoidoscopy in 1 year for surveillance.  For questions, problems or results please call your physician - Emeka Paris MD at Work:  [phone].  [facility], EMERGENCY ROOM PHONE NUMBER: [phone]  IF A COMPLICATION OR EMERGENCY SITUATION ARISES AND YOU ARE UNABLE TO REACH   YOUR PHYSICIAN - GO DIRECTLY TO THE EMERGENCY ROOM.  Emeka Paris MD  5/29/2024 1:20:06 PM  This report has been verified and signed electronically.  Dear patient,  As a result of recent federal legislation (The Federal Cures Act), you may   receive lab or pathology results from your procedure in your [facility]   account before your physician is able to contact you. Your physician or   their representative will relay the results to you with their   recommendations at their soonest availability.  Thank you,  PROVATION

## 2024-05-30 ENCOUNTER — PATIENT MESSAGE (OUTPATIENT)
Dept: SURGERY | Facility: CLINIC | Age: 49
End: 2024-05-30
Payer: COMMERCIAL

## 2024-06-03 ENCOUNTER — TELEPHONE (OUTPATIENT)
Dept: INTERVENTIONAL RADIOLOGY/VASCULAR | Facility: CLINIC | Age: 49
End: 2024-06-03
Payer: COMMERCIAL

## 2024-06-03 NOTE — TELEPHONE ENCOUNTER
Spoke to pt on phone, Pt is rescheduled per dr daugherty request on 6/10/2024 with arrival time of 8am for IR procedure at  location. Pt aware and confirmed, Thanks

## 2024-06-04 ENCOUNTER — PATIENT MESSAGE (OUTPATIENT)
Dept: SURGERY | Facility: CLINIC | Age: 49
End: 2024-06-04
Payer: COMMERCIAL

## 2024-06-10 ENCOUNTER — HOSPITAL ENCOUNTER (OUTPATIENT)
Dept: INTERVENTIONAL RADIOLOGY/VASCULAR | Facility: HOSPITAL | Age: 49
Discharge: HOME | End: 2024-06-10
Attending: FAMILY MEDICINE
Payer: COMMERCIAL

## 2024-06-10 ENCOUNTER — ANESTHESIA (OUTPATIENT)
Dept: INTERVENTIONAL RADIOLOGY/VASCULAR | Facility: HOSPITAL | Age: 49
End: 2024-06-10
Payer: COMMERCIAL

## 2024-06-10 DIAGNOSIS — C78.7 METASTASIS TO LIVER: ICD-10-CM

## 2024-06-10 DIAGNOSIS — U07.1 PNEUMONIA DUE TO COVID-19 VIRUS: ICD-10-CM

## 2024-06-10 DIAGNOSIS — J12.82 PNEUMONIA DUE TO COVID-19 VIRUS: ICD-10-CM

## 2024-06-10 DIAGNOSIS — C18.7 MALIGNANT NEOPLASM OF SIGMOID COLON: Primary | ICD-10-CM

## 2024-06-10 LAB — POCT GLUCOSE: 109 MG/DL (ref 70–110)

## 2024-06-10 PROCEDURE — 37000009 HC ANESTHESIA EA ADD 15 MINS

## 2024-06-10 PROCEDURE — 76940 US GUIDE TISSUE ABLATION: CPT | Mod: 26,59,, | Performed by: RADIOLOGY

## 2024-06-10 PROCEDURE — 47382 PERCUT ABLATE LIVER RF: CPT | Mod: RT | Performed by: RADIOLOGY

## 2024-06-10 PROCEDURE — 76940 US GUIDE TISSUE ABLATION: CPT | Mod: TC,59 | Performed by: RADIOLOGY

## 2024-06-10 PROCEDURE — 37000008 HC ANESTHESIA 1ST 15 MINUTES

## 2024-06-10 PROCEDURE — 77013 CT GUIDE FOR TISSUE ABLATION: CPT | Mod: 26,,, | Performed by: RADIOLOGY

## 2024-06-10 PROCEDURE — 25000003 PHARM REV CODE 250: Performed by: NURSE ANESTHETIST, CERTIFIED REGISTERED

## 2024-06-10 PROCEDURE — 63600175 PHARM REV CODE 636 W HCPCS: Performed by: ANESTHESIOLOGY

## 2024-06-10 PROCEDURE — 63600175 PHARM REV CODE 636 W HCPCS: Performed by: NURSE ANESTHETIST, CERTIFIED REGISTERED

## 2024-06-10 PROCEDURE — 27200940 IR RF ABLATION LIVER TUMORS

## 2024-06-10 PROCEDURE — 77013 CT GUIDE FOR TISSUE ABLATION: CPT | Mod: TC | Performed by: RADIOLOGY

## 2024-06-10 RX ORDER — MIDAZOLAM HYDROCHLORIDE 1 MG/ML
INJECTION INTRAMUSCULAR; INTRAVENOUS
Status: DISCONTINUED | OUTPATIENT
Start: 2024-06-10 | End: 2024-06-10

## 2024-06-10 RX ORDER — DEXAMETHASONE SODIUM PHOSPHATE 4 MG/ML
INJECTION, SOLUTION INTRA-ARTICULAR; INTRALESIONAL; INTRAMUSCULAR; INTRAVENOUS; SOFT TISSUE
Status: DISCONTINUED | OUTPATIENT
Start: 2024-06-10 | End: 2024-06-10

## 2024-06-10 RX ORDER — FENTANYL CITRATE 50 UG/ML
INJECTION, SOLUTION INTRAMUSCULAR; INTRAVENOUS
Status: DISCONTINUED | OUTPATIENT
Start: 2024-06-10 | End: 2024-06-10

## 2024-06-10 RX ORDER — SODIUM CHLORIDE 0.9 % (FLUSH) 0.9 %
3 SYRINGE (ML) INJECTION
Status: DISCONTINUED | OUTPATIENT
Start: 2024-06-10 | End: 2024-06-11 | Stop reason: HOSPADM

## 2024-06-10 RX ORDER — PROPOFOL 10 MG/ML
VIAL (ML) INTRAVENOUS
Status: DISCONTINUED | OUTPATIENT
Start: 2024-06-10 | End: 2024-06-10

## 2024-06-10 RX ORDER — SODIUM CHLORIDE 9 MG/ML
INJECTION, SOLUTION INTRAVENOUS CONTINUOUS
Status: DISCONTINUED | OUTPATIENT
Start: 2024-06-10 | End: 2024-06-11 | Stop reason: HOSPADM

## 2024-06-10 RX ORDER — LIDOCAINE HYDROCHLORIDE 20 MG/ML
INJECTION, SOLUTION EPIDURAL; INFILTRATION; INTRACAUDAL; PERINEURAL
Status: DISCONTINUED | OUTPATIENT
Start: 2024-06-10 | End: 2024-06-10

## 2024-06-10 RX ORDER — CEFAZOLIN SODIUM 1 G/3ML
INJECTION, POWDER, FOR SOLUTION INTRAMUSCULAR; INTRAVENOUS
Status: DISCONTINUED | OUTPATIENT
Start: 2024-06-10 | End: 2024-06-10

## 2024-06-10 RX ORDER — ONDANSETRON HYDROCHLORIDE 2 MG/ML
INJECTION, SOLUTION INTRAVENOUS
Status: DISCONTINUED | OUTPATIENT
Start: 2024-06-10 | End: 2024-06-10

## 2024-06-10 RX ORDER — LIDOCAINE HYDROCHLORIDE 10 MG/ML
1 INJECTION, SOLUTION EPIDURAL; INFILTRATION; INTRACAUDAL; PERINEURAL ONCE
Status: DISCONTINUED | OUTPATIENT
Start: 2024-06-10 | End: 2024-06-11 | Stop reason: HOSPADM

## 2024-06-10 RX ORDER — KETAMINE HCL IN 0.9 % NACL 50 MG/5 ML
SYRINGE (ML) INTRAVENOUS
Status: DISCONTINUED | OUTPATIENT
Start: 2024-06-10 | End: 2024-06-10

## 2024-06-10 RX ORDER — AMOXICILLIN AND CLAVULANATE POTASSIUM 500; 125 MG/1; MG/1
1 TABLET, FILM COATED ORAL 3 TIMES DAILY
Qty: 15 TABLET | Refills: 0 | Status: SHIPPED | OUTPATIENT
Start: 2024-06-10 | End: 2024-06-15

## 2024-06-10 RX ORDER — ROCURONIUM BROMIDE 10 MG/ML
INJECTION, SOLUTION INTRAVENOUS
Status: DISCONTINUED | OUTPATIENT
Start: 2024-06-10 | End: 2024-06-10

## 2024-06-10 RX ORDER — PHENYLEPHRINE HYDROCHLORIDE 10 MG/ML
INJECTION INTRAVENOUS
Status: DISCONTINUED | OUTPATIENT
Start: 2024-06-10 | End: 2024-06-10

## 2024-06-10 RX ORDER — HALOPERIDOL 5 MG/ML
0.5 INJECTION INTRAMUSCULAR EVERY 10 MIN PRN
Status: DISCONTINUED | OUTPATIENT
Start: 2024-06-10 | End: 2024-06-11 | Stop reason: HOSPADM

## 2024-06-10 RX ORDER — ONDANSETRON 4 MG/1
4 TABLET, FILM COATED ORAL EVERY 6 HOURS PRN
Qty: 15 TABLET | Refills: 0 | Status: SHIPPED | OUTPATIENT
Start: 2024-06-10

## 2024-06-10 RX ORDER — FENTANYL CITRATE 50 UG/ML
25 INJECTION, SOLUTION INTRAMUSCULAR; INTRAVENOUS EVERY 5 MIN PRN
Status: COMPLETED | OUTPATIENT
Start: 2024-06-10 | End: 2024-06-10

## 2024-06-10 RX ORDER — SUCCINYLCHOLINE CHLORIDE 20 MG/ML
INJECTION INTRAMUSCULAR; INTRAVENOUS
Status: DISCONTINUED | OUTPATIENT
Start: 2024-06-10 | End: 2024-06-10

## 2024-06-10 RX ADMIN — Medication 30 MG: at 01:06

## 2024-06-10 RX ADMIN — SODIUM CHLORIDE: 0.9 INJECTION, SOLUTION INTRAVENOUS at 12:06

## 2024-06-10 RX ADMIN — FENTANYL CITRATE 25 MCG: 50 INJECTION INTRAMUSCULAR; INTRAVENOUS at 03:06

## 2024-06-10 RX ADMIN — DEXAMETHASONE SODIUM PHOSPHATE 4 MG: 4 INJECTION, SOLUTION INTRAMUSCULAR; INTRAVENOUS at 01:06

## 2024-06-10 RX ADMIN — CEFAZOLIN 1 G: 330 INJECTION, POWDER, FOR SOLUTION INTRAMUSCULAR; INTRAVENOUS at 12:06

## 2024-06-10 RX ADMIN — FENTANYL CITRATE 50 MCG: 50 INJECTION, SOLUTION INTRAMUSCULAR; INTRAVENOUS at 12:06

## 2024-06-10 RX ADMIN — PHENYLEPHRINE HYDROCHLORIDE 0.2 MCG/KG/MIN: 10 INJECTION INTRAVENOUS at 01:06

## 2024-06-10 RX ADMIN — MIDAZOLAM HYDROCHLORIDE 2 MG: 2 INJECTION, SOLUTION INTRAMUSCULAR; INTRAVENOUS at 12:06

## 2024-06-10 RX ADMIN — ONDANSETRON 4 MG: 2 INJECTION INTRAMUSCULAR; INTRAVENOUS at 01:06

## 2024-06-10 RX ADMIN — LIDOCAINE HYDROCHLORIDE 100 MG: 20 INJECTION, SOLUTION EPIDURAL; INFILTRATION; INTRACAUDAL; PERINEURAL at 12:06

## 2024-06-10 RX ADMIN — Medication 20 MG: at 01:06

## 2024-06-10 RX ADMIN — PROPOFOL 150 MG: 10 INJECTION, EMULSION INTRAVENOUS at 12:06

## 2024-06-10 RX ADMIN — ROCURONIUM BROMIDE 5 MG: 10 INJECTION INTRAVENOUS at 12:06

## 2024-06-10 RX ADMIN — SUGAMMADEX 200 MG: 100 INJECTION, SOLUTION INTRAVENOUS at 01:06

## 2024-06-10 RX ADMIN — SUCCINYLCHOLINE CHLORIDE 120 MG: 20 INJECTION, SOLUTION INTRAMUSCULAR; INTRAVENOUS at 12:06

## 2024-06-10 RX ADMIN — PHENYLEPHRINE HYDROCHLORIDE 200 MCG: 10 INJECTION INTRAVENOUS at 01:06

## 2024-06-10 RX ADMIN — PHENYLEPHRINE HYDROCHLORIDE 100 MCG: 10 INJECTION INTRAVENOUS at 12:06

## 2024-06-10 RX ADMIN — ROCURONIUM BROMIDE 45 MG: 10 INJECTION INTRAVENOUS at 01:06

## 2024-06-10 NOTE — DISCHARGE SUMMARY
VIR Discharge Summary      Hospital Course: No complications    Admit Date: 6/10/2024  Discharge Date: 06/10/2024     Instructions Given to Patient: Yes  Diet: Resume prior diet  Activity:   Activity as tolerated and no driving for today.    Description of Condition on Discharge: Stable  Vital Signs (Most Recent): Temp: 97.2 °F (36.2 °C) (06/10/24 0830)  Pulse: 84 (06/10/24 0830)  Resp: 18 (06/10/24 0830)  BP: 108/69 (06/10/24 0830)  SpO2: 100 % (06/10/24 0830)    Discharge Disposition: Home    Discharge Diagnosis: liver met     Follow-up: As scheduled. Will call patient with any follow up plans.        April Orr MD  VIR Fellow

## 2024-06-10 NOTE — PROCEDURES
VIR procedure note      Pre Op Diagnosis: Liver mets  Post Op Diagnosis: Same    Procedure: Microwave ablation     Procedure performed by:  April Orr MD /  Wade Larios MD    Written Informed Consent Obtained: Yes  Specimen Removed: NO  Estimated Blood Loss: Minimal    Findings:     Image guided microwave ablation of liver lesion.    Patient tolerated procedure well.         April Orr MD  VIR Fellow

## 2024-06-10 NOTE — ANESTHESIA PROCEDURE NOTES
Intubation    Date/Time: 6/10/2024 12:58 PM    Performed by: Yelena Ko CRNA  Authorized by: Christiano Boggs MD    Intubation:     Induction:  Inhalational - mask    Intubated:  Preinduction-awake intubation    Mask Ventilation:  Easy mask    Attempts:  1    Attempted By:  JEB    Blade:  Vincent 3    Laryngeal View Grade: Grade I - full view of cords      Difficult Airway Encountered?: No      Complications:  None    Airway Device:  Oral endotracheal tube    Airway Device Size:  7.0    Style/Cuff Inflation:  Cuffed    Tube secured:  20    Secured at:  The lips    Placement Verified By:  Capnometry    Complicating Factors:  None    Findings Post-Intubation:  BS equal bilateral

## 2024-06-10 NOTE — PLAN OF CARE
Pt arrived to IR CT  for MWA of right liver lesion with anesthesia. Pt oriented to unit and staff. Plan of care reviewed with patient, patient verbalizes understanding. Comfort measures utilized. Pt safely transferred from stretcher to procedural table. Fall risk reviewed with patient, fall risk interventions maintained. Positioner pillows utilized to minimize pressure points. Blankets applied. Pt prepped and draped utilizing standard sterile technique. Timeouts completed utilizing standard universal time-out, per department and facility policy.  Anesthesia at bedside; Refer to anesthesia record regarding sedation and vital signs.

## 2024-06-10 NOTE — DISCHARGE INSTRUCTIONS
"Please call with any questions or concerns.      Monday through Friday 8:00 am - 4:30 pm    Interventional Radiology Clinic  [phone]    After Hours    Ask the  for the "Radiology Resident on call"  [phone]      "

## 2024-06-10 NOTE — H&P
Radiology History & Physical      SUBJECTIVE:     Chief Complaint: microwave ablation of liver lesion    History of Present Illness:  Collette Castro is a 49 y.o. female with a lesion within the right hepatic lobe, who presents for microwave ablation.    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 Analyses with CancerNext-Expanded (77 genes)    Pre-diabetes     Sphincter of Oddi dysfunction      Past Surgical History:   Procedure Laterality Date    CHOLECYSTECTOMY      COLONOSCOPY      CYSTOSCOPY W/ URETERAL STENT PLACEMENT Bilateral 5/9/2024    Procedure: CYSTOSCOPY, WITH URETERAL STENT INSERTION;  Surgeon: Cas Bang MD;  Location: [facility] OR [address] [place] [address];  Service: Urology;  Laterality: Bilateral;    EXPLORATORY LAPAROTOMY      FLEXIBLE SIGMOIDOSCOPY N/A 5/9/2024    Procedure: SIGMOIDOSCOPY, FLEXIBLE;  Surgeon: SANTIAGO Paris MD;  [facility];  Service: Colon and Rectal;  Laterality: N/A;    LOW ANTERIOR RESECTION OF COLON N/A 5/9/2024    Procedure: RESECTION, COLON, LOW ANTERIOR, robotic converted to open;  Surgeon: SANTIAGO Paris MD;  [facility];  Service: Colon and Rectal;  Laterality: N/A;    LYSIS OF ADHESIONS  5/9/2024    Procedure: LYSIS, ADHESIONS;  Surgeon: SANTIAGO Paris MD;  [facility];  Service: Colon and Rectal;;  extensive, greater  than 2 hours; 22 modifier    MOBILIZATION, SPLENIC FLEXURE, LAPAROSCOPIC  5/9/2024    Procedure: MOBILIZATION, SPLENIC FLEXURE, LAPAROSCOPIC;  Surgeon: SANTIAGO Paris MD;  [facility];  Service: Colon and Rectal;;  22 modifier; greater than 2 hours    OVARY SURGERY      relocation    ROBOT-ASSISTED LAPAROSCOPIC RESECTION OF SIGMOID COLON USING DA ESTEFANI XI N/A 5/9/2024    Procedure: XI ROBOTIC COLECTOMY, SIGMOID;  Surgeon: SANTIAGO Paris MD;  Location: [facility] OR [facility];  Service: Colon and Rectal;  Laterality: N/A;  converted to open    thryoid nodule         Home Meds:   Prior to Admission medications    Medication Sig Start Date End Date Taking? Authorizing Provider   acetaminophen (TYLENOL) 650 MG TbSR Take 1 tablet (650 mg total) by mouth every 8 (eight) hours. 5/13/24  Yes Sincere Madsen MD   ibuprofen (ADVIL,MOTRIN) 600 MG tablet Take 1 tablet (600 mg total) by mouth 3 (three) times daily. 5/13/24  Yes Sincere Madsen MD   loperamide (IMODIUM) 2 mg capsule Take 1 capsule (2 mg total) by mouth 4 (four) times daily as needed for Diarrhea (for large volume thin ileostomy output or <1.5L of ileostomy output). 5/18/24  Yes Sincere Madsen MD   metFORMIN (GLUCOPHAGE-XR) 500 MG ER 24hr tablet Take 500 mg by mouth 2 (two) times daily with meals. 3/28/24  Yes Provider, Historical   ALPRAZolam (XANAX) 0.5 MG tablet Take 0.5 mg by mouth 2 (two) times daily as needed. 1/17/24   Provider, Historical   nitrofurantoin, macrocrystal-monohydrate, (MACROBID) 100 MG capsule Take 100 mg by mouth 2 (two) times daily.  Patient not taking: Reported on 5/27/2024 9/7/23   Provider, Historical   VAGIFEM 10 mcg Tab Place vaginally. Pt takes once or twice a month  Patient not taking: Reported on 5/27/2024 9/7/23   Provider, Historical     Anticoagulants/Antiplatelets: no anticoagulation    Allergies:   Review of patient's allergies indicates:   Allergen Reactions    Codeine Other (See Comments)     Per patient, causes pancreatitis    Hydrocodone bitartrate     Hydrocodone-acetaminophen Other (See Comments)    Opioids - morphine analogues      Chest pain/Okay w/Fentanyl 50 mcg - received in ED x3 over 6 hours without issues.    Oxycodone      Pancreatitis w/ all oral narctotics    Tramadol hcl      pancreatitis     Sedation History:  no adverse reactions    Review of Systems:   Hematological: no known coagulopathies  Respiratory: no shortness of breath  Cardiovascular: no chest  pain  Gastrointestinal: no abdominal pain  Genito-Urinary: no dysuria  Musculoskeletal: negative  Neurological: no TIA or stroke symptoms         OBJECTIVE:     Vital Signs (Most Recent)  Temp: 97.2 °F (36.2 °C) (06/10/24 0830)  Pulse: 84 (06/10/24 0830)  Resp: 18 (06/10/24 0830)  BP: 108/69 (06/10/24 0830)  SpO2: 100 % (06/10/24 0830)    Physical Exam:  ASA: 2  Mallampati: per anesthesia    General: no acute distress  Mental Status: alert and oriented to person, place and time  HEENT: normocephalic, atraumatic  Chest: unlabored breathing  Heart: regular heart rate  Abdomen: RLQ ostomy  Extremity: moves all extremities    Laboratory  Lab Results   Component Value Date    INR 0.9 06/10/2024       Lab Results   Component Value Date    WBC 8.81 05/27/2024    HGB 13.2 05/27/2024    HCT 37.7 05/27/2024    MCV 90 05/27/2024     (H) 05/27/2024      Lab Results   Component Value Date     (H) 05/27/2024     (L) 05/27/2024    K 4.7 05/27/2024    CL 96 05/27/2024    CO2 22 (L) 05/27/2024    BUN 22 (H) 05/27/2024    CREATININE 1.3 05/27/2024    CALCIUM 10.0 05/27/2024    MG 1.8 05/27/2024     (H) 05/27/2024     (H) 05/27/2024    ALBUMIN 3.9 05/27/2024    BILITOT 0.6 05/27/2024       ASSESSMENT/PLAN:     Sedation Plan: per anesthesia  Patient will undergo microwave ablation of right hepatic lesion.    Written consent was obtained from the patient. All questions answered.    Jaycob Gibson MD  Radiology PGY-4  [facility]

## 2024-06-10 NOTE — PLAN OF CARE
Pt arrived to unit accompanied by her family.  Pre op orders and assessment initiated.  Pt declined poct, states she's had multiple stomach surgeries, partial removal of her uterus, and ovary surgery.   She understands the risk, but states she's unable to get pregnant.  Pt remains npo. Call bell within reach.

## 2024-06-10 NOTE — TRANSFER OF CARE
"Anesthesia Transfer of Care Note    Patient: Collette Castro    Procedure(s) Performed: * No procedures listed *    Patient location: PACU    Anesthesia Type: general    Transport from OR: Transported from OR on 6-10 L/min O2 by face mask with adequate spontaneous ventilation    Post pain: adequate analgesia    Post assessment: no apparent anesthetic complications    Post vital signs: stable    Level of consciousness: awake, alert and oriented    Nausea/Vomiting: no nausea/vomiting    Complications: none    Transfer of care protocol was followed      Last vitals: Visit Vitals  /69 (BP Location: Left arm, Patient Position: Lying)   Pulse 84   Temp 36.2 °C (97.2 °F) (Temporal)   Resp 18   Ht 5' 10" (1.778 m)   Wt 68.5 kg (151 lb)   SpO2 100%   Breastfeeding No   BMI 21.67 kg/m²     "

## 2024-06-10 NOTE — PLAN OF CARE
MWA of right hepatic lesion completed. Pt tolerated well. VSS. No signs or symptoms of distress noted.  RUQ dressing applied, CDI.  Pt will be transferred to recovery bed with anesthesia and report to be given at bedside.

## 2024-06-10 NOTE — PLAN OF CARE
Pt discharged per orders. AAOx'a 3. VSS. No s/s of acute distress. Resp even and unlabored. No  complaints of pain verbalized. IV removed prior to discharge. Reviewed discharge instructions, follow up care/appointments with patient and spouse. Patient and spouse verbalized understanding of discharge and follow up care/appointments. Discharged with all personal belongings.Pt transported home via personal transportation.

## 2024-06-10 NOTE — ANESTHESIA PREPROCEDURE EVALUATION
06/10/2024  [facility]  Anesthesia Pre-Operative Evaluation         Patient Name: Collette Castro  YOB: 1975  MRN: [id number]    SUBJECTIVE:     Pre-operative evaluation for * No procedures listed *     06/10/2024    Collette Castro is a 49 y.o. female w/ a pertinent PMHx of colon CA w metastatic disease to liver here for RFA ablation.      Opioid intolerance, ok to receive fentanyl as needed.     Full medical history listed below      LDA: None documented.    Prev airway: None documented.     GTTs: None documented.        Patient Active Problem List   Diagnosis    Pneumonia due to COVID-19 virus    Malignant neoplasm of sigmoid colon    Ostomy nurse consultation    Personal history of irradiation    History of cervical cancer    Metastasis to liver       Review of patient's allergies indicates:   Allergen Reactions    Codeine Other (See Comments)     Per patient, causes pancreatitis    Hydrocodone bitartrate     Hydrocodone-acetaminophen Other (See Comments)    Opioids - morphine analogues      Chest pain/Okay w/Fentanyl 50 mcg - received in ED x3 over 6 hours without issues.    Oxycodone      Pancreatitis w/ all oral narctotics    Tramadol hcl      pancreatitis       Current Inpatient Medications:   LIDOcaine (PF) 10 mg/ml (1%)  1 mL Other Once       Current Outpatient Medications on File Prior to Encounter   Medication Sig Dispense Refill    acetaminophen (TYLENOL) 650 MG TbSR Take 1 tablet (650 mg total) by mouth every 8 (eight) hours. 60 tablet 0    ibuprofen (ADVIL,MOTRIN) 600 MG tablet Take 1 tablet (600 mg total) by mouth 3 (three) times daily. 60 tablet 0    loperamide (IMODIUM) 2 mg capsule Take 1 capsule (2 mg total) by mouth 4 (four) times daily as needed for Diarrhea (for large volume thin ileostomy output or <1.5L of ileostomy output). 160 capsule 2    metFORMIN  (GLUCOPHAGE-XR) 500 MG ER 24hr tablet Take 500 mg by mouth 2 (two) times daily with meals.      ALPRAZolam (XANAX) 0.5 MG tablet Take 0.5 mg by mouth 2 (two) times daily as needed.      nitrofurantoin, macrocrystal-monohydrate, (MACROBID) 100 MG capsule Take 100 mg by mouth 2 (two) times daily. (Patient not taking: Reported on 5/27/2024)      VAGIFEM 10 mcg Tab Place vaginally. Pt takes once or twice a month (Patient not taking: Reported on 5/27/2024)       Current Facility-Administered Medications on File Prior to Encounter   Medication Dose Route Frequency Provider Last Rate Last Admin    gabapentin capsule 300 mg  300 mg Oral TID Natalya Rivera NP   300 mg at 05/12/24 2132       Past Surgical History:   Procedure Laterality Date    CHOLECYSTECTOMY      COLONOSCOPY      CYSTOSCOPY W/ URETERAL STENT PLACEMENT Bilateral 5/9/2024    Procedure: CYSTOSCOPY, WITH URETERAL STENT INSERTION;  Surgeon: Cas Bang MD;  [facility];  Service: Urology;  Laterality: Bilateral;    EXPLORATORY LAPAROTOMY      FLEXIBLE SIGMOIDOSCOPY N/A 5/9/2024    Procedure: SIGMOIDOSCOPY, FLEXIBLE;  Surgeon: SANTIAGO Paris MD;  [facility];  Service: Colon and Rectal;  Laterality: N/A;    LOW ANTERIOR RESECTION OF COLON N/A 5/9/2024    Procedure: RESECTION, COLON, LOW ANTERIOR, robotic converted to open;  Surgeon: SANTIAGO Paris MD;  [facility];  Service: Colon and Rectal;  Laterality: N/A;    LYSIS OF ADHESIONS  5/9/2024    Procedure: LYSIS, ADHESIONS;  Surgeon: SANTIAGO Paris MD;  [facility];  Service: Colon and Rectal;;  extensive, greater  than 2 hours; 22 modifier    MOBILIZATION, SPLENIC FLEXURE, LAPAROSCOPIC  5/9/2024    Procedure: MOBILIZATION, SPLENIC FLEXURE, LAPAROSCOPIC;  Surgeon: SANTIAGO Paris MD;  Location: [facility] OR 2ND [facility]LR;  Service: Colon and Rectal;;  22 modifier; greater than 2 hours    OVARY SURGERY      relocation    ROBOT-ASSISTED  "LAPAROSCOPIC RESECTION OF SIGMOID COLON USING DA ESTEFANI XI N/A 5/9/2024    Procedure: XI ROBOTIC COLECTOMY, SIGMOID;  Surgeon: SANTIAGO Paris MD;  Location: [facility] OR [address] [place] [address];  Service: Colon and Rectal;  Laterality: N/A;  converted to open    thryoid nodule         Social History     Socioeconomic History    Marital status:    Tobacco Use    Smoking status: Never    Smokeless tobacco: Never   Substance and Sexual Activity    Alcohol use: Not Currently     Comment: once a month    Drug use: Never    Sexual activity: Yes     Partners: Male     Birth control/protection: See Surgical Hx     Social Determinants of Health     Financial Resource Strain: Low Risk  (5/16/2024)    Overall Financial Resource Strain (CARDIA)     Difficulty of Paying Living Expenses: Not hard at all   Food Insecurity: No Food Insecurity (5/16/2024)    Hunger Vital Sign     Worried About Running Out of Food in the Last Year: Never true     Ran Out of Food in the Last Year: Never true   Transportation Needs: No Transportation Needs (5/16/2024)    TRANSPORTATION NEEDS     Transportation : No   Physical Activity: Insufficiently Active (5/16/2024)    Exercise Vital Sign     Days of Exercise per Week: 3 days     Minutes of Exercise per Session: 30 min   Stress: No Stress Concern Present (5/16/2024)    [employer] [place] [employer] - Occupational Stress Questionnaire     Feeling of Stress : Not at all   Housing Stability: Low Risk  (5/16/2024)    Housing Stability Vital Sign     Unable to Pay for Housing in the Last Year: No     Homeless in the Last Year: No       OBJECTIVE:     Vital Signs Range (Last 24H):         CBC:   No results for input(s): "WBC", "RBC", "HGB", "HCT", "PLT", "MCV", "MCH", "MCHC" in the last 72 hours.    CMP: No results for input(s): "NA", "K", "CL", "CO2", "BUN", "CREATININE", "GLU", "MG", "PHOS", "CALCIUM", "ALBUMIN", "PROT", "ALKPHOS", "ALT", "AST", "BILITOT" in the last 72 hours.    INR:  Recent Labs     " 06/10/24  0720   INR 0.9       Diagnostic Studies: No relevant studies.    EKG:   No results found for this or any previous visit.     2D ECHO:   No results found for this or any previous visit.         ASSESSMENT/PLAN:           Pre-op Assessment    I have reviewed the Patient Summary Reports.     I have reviewed the Nursing Notes. I have reviewed the NPO Status.   I have reviewed the Medications.     Review of Systems  Anesthesia Hx:   History of prior surgery of interest to airway management or planning:          Denies Family Hx of Anesthesia complications.    Denies Personal Hx of Anesthesia complications.                        Physical Exam  General: Well nourished, Cooperative, Alert and Oriented    Airway:  Mallampati: II   Mouth Opening: Normal  TM Distance: Normal  Tongue: Normal  Neck ROM: Normal ROM    Dental:  Intact    Chest/Lungs:  Clear to auscultation, Normal Respiratory Rate    Heart:  Rate: Normal  Rhythm: Regular Rhythm        Anesthesia Plan  Type of Anesthesia, risks & benefits discussed:    Anesthesia Type: Gen ETT  Intra-op Monitoring Plan: Standard ASA Monitors  Post Op Pain Control Plan: multimodal analgesia and IV/PO Opioids PRN  Induction:  IV  Airway Plan: Video  Informed Consent: Informed consent signed with the Patient and all parties understand the risks and agree with anesthesia plan.  All questions answered.   ASA Score: 3  Day of Surgery Review of History & Physical: H&P Update referred to the surgeon/provider.    Ready For Surgery From Anesthesia Perspective.     .

## 2024-06-10 NOTE — NURSING TRANSFER
Nursing Transfer Note      6/10/2024   4:41 PM    Pt transported to [facility] on stretcher.  VSS, denies pain, n/v, sob.  Report given to receiving dosc rn, family at bedside

## 2024-06-10 NOTE — Clinical Note
Right: Abdomen.   Scrubbed with Chlorhexidine/Alcohol.    Hair: N/A.  Skin prep dry before draping.  Prepped by: Tatyana Newell MD 6/10/2024 1:28 PM.

## 2024-06-11 ENCOUNTER — PATIENT MESSAGE (OUTPATIENT)
Dept: SURGERY | Facility: CLINIC | Age: 49
End: 2024-06-11
Payer: COMMERCIAL

## 2024-06-11 DIAGNOSIS — C18.7 MALIGNANT NEOPLASM OF SIGMOID COLON: Primary | ICD-10-CM

## 2024-06-11 DIAGNOSIS — C78.7 METASTASIS TO LIVER: ICD-10-CM

## 2024-06-11 NOTE — ANESTHESIA POSTPROCEDURE EVALUATION
Anesthesia Post Evaluation    Patient: Collette Castro    Procedure(s) Performed: * No procedures listed *    Final Anesthesia Type: general      Patient location during evaluation: PACU  Patient participation: Yes- Able to Participate  Level of consciousness: awake and alert and oriented  Post-procedure vital signs: reviewed and stable  Pain management: adequate  Airway patency: patent  GINA mitigation strategies: Intraoperative administration of CPAP, nasopharyngeal airway, or oral appliance during sedation, Multimodal analgesia, Extubation while patient is awake, Verification of full reversal of neuromuscular block and Extubation and recovery carried out in lateral, semiupright, or other nonsupine position  PONV status at discharge: No PONV  Anesthetic complications: no      Cardiovascular status: hemodynamically stable  Respiratory status: unassisted  Hydration status: euvolemic  Follow-up not needed.              Vitals Value Taken Time   /63 06/10/24 1830   Temp 36.7 °C (98.1 °F) 06/10/24 1645   Pulse 92 06/10/24 1830   Resp 25 06/10/24 1830   SpO2 96 % 06/10/24 1830         Event Time   Out of Recovery 16:41:00         Pain/Timothy Score: Pain Rating Prior to Med Admin: 6 (6/10/2024  3:50 PM)  Pain Rating Post Med Admin: 2 (6/10/2024  4:15 PM)  Timothy Score: 10 (6/10/2024  5:00 PM)

## 2024-06-12 ENCOUNTER — TELEPHONE (OUTPATIENT)
Dept: SURGERY | Facility: CLINIC | Age: 49
End: 2024-06-12
Payer: COMMERCIAL

## 2024-06-12 ENCOUNTER — TELEPHONE (OUTPATIENT)
Dept: INTERVENTIONAL RADIOLOGY/VASCULAR | Facility: HOSPITAL | Age: 49
End: 2024-06-12
Payer: COMMERCIAL

## 2024-06-12 VITALS
RESPIRATION RATE: 25 BRPM | SYSTOLIC BLOOD PRESSURE: 106 MMHG | HEIGHT: 70 IN | OXYGEN SATURATION: 96 % | BODY MASS INDEX: 21.62 KG/M2 | HEART RATE: 92 BPM | WEIGHT: 151 LBS | TEMPERATURE: 98 F | DIASTOLIC BLOOD PRESSURE: 63 MMHG

## 2024-06-12 NOTE — TELEPHONE ENCOUNTER
lvm    ----- Message from Melissa Aaron LPN sent at 6/12/2024 10:38 AM CDT -----    ----- Message -----  From: Mimi Bobby, Patient Care Assistant  Sent: 6/12/2024  10:31 AM CDT  To: Wellington Hendrickson Staff    Type: Needs Medical Advice  Who Called:  zen  Best Call Back Number: [phone]    Additional Information: zen states she would like a call back about her lab results she is concerned , and she is having some other problems she would like to discuss , please call to further discuss, thank you.

## 2024-06-12 NOTE — NURSING
Called pt to follow up after IR procedure on 6/10/24.  Pt denies, SOB, nausea, vomiting, fever or hematoma @ site.  Pt verbalized she has IR clinic # and after hours #.

## 2024-06-21 DIAGNOSIS — C18.7 MALIGNANT NEOPLASM OF SIGMOID COLON: Primary | ICD-10-CM

## 2024-06-27 ENCOUNTER — TELEPHONE (OUTPATIENT)
Dept: SURGERY | Facility: CLINIC | Age: 49
End: 2024-06-27
Payer: COMMERCIAL

## 2024-07-01 ENCOUNTER — TELEPHONE (OUTPATIENT)
Dept: SURGERY | Facility: CLINIC | Age: 49
End: 2024-07-01
Payer: COMMERCIAL

## 2024-07-01 NOTE — TELEPHONE ENCOUNTER
----- Message from Bianca Jean Baptiste sent at 7/1/2024 12:07 PM CDT -----  Type: Needs Medical Advice  Who Called:  Kenyatta Schrader (Oncology  .)  Symptoms (please be specific):    How long has patient had these symptoms:    Pharmacy name and phone #:    Best Call Back Number:   Additional Information: Kenyatta is requesting a call back regarding patient.

## 2024-07-01 NOTE — TELEPHONE ENCOUNTER
Faxed Dr Paris office note and path, ablation note, and schedule for upcoming testing to [facility] for her [facility].

## 2024-07-03 ENCOUNTER — PATIENT MESSAGE (OUTPATIENT)
Dept: SURGERY | Facility: CLINIC | Age: 49
End: 2024-07-03
Payer: COMMERCIAL

## 2024-07-10 ENCOUNTER — HOSPITAL ENCOUNTER (OUTPATIENT)
Dept: RADIOLOGY | Facility: HOSPITAL | Age: 49
Discharge: HOME | End: 2024-07-10
Attending: FAMILY MEDICINE
Payer: COMMERCIAL

## 2024-07-10 DIAGNOSIS — C18.7 MALIGNANT NEOPLASM OF SIGMOID COLON: Primary | ICD-10-CM

## 2024-07-10 DIAGNOSIS — C78.7 METASTASIS TO LIVER: ICD-10-CM

## 2024-07-10 DIAGNOSIS — C18.7 MALIGNANT NEOPLASM OF SIGMOID COLON: ICD-10-CM

## 2024-07-15 ENCOUNTER — PATIENT MESSAGE (OUTPATIENT)
Dept: SURGERY | Facility: CLINIC | Age: 49
End: 2024-07-15
Payer: COMMERCIAL

## 2024-07-16 ENCOUNTER — TELEPHONE (OUTPATIENT)
Dept: SURGERY | Facility: CLINIC | Age: 49
End: 2024-07-16
Payer: COMMERCIAL

## 2024-07-16 DIAGNOSIS — C18.7 MALIGNANT NEOPLASM OF SIGMOID COLON: ICD-10-CM

## 2024-07-16 DIAGNOSIS — C78.7 METASTASIS TO LIVER: Primary | ICD-10-CM

## 2024-07-16 NOTE — TELEPHONE ENCOUNTER
Call placed to pt regarding portal message. Informed of audio appt details tomorrow and aware Dr. Yuan may be a little late calling her due to double booked schedule. Pt expressed gratitude for scheduling pt and verbally acknowledged all appt details.

## 2024-07-16 NOTE — TELEPHONE ENCOUNTER
----- Message from Kimo Yuan MD sent at 7/15/2024  4:05 PM CDT -----  Nydia can we send Collette's colon resection 5/9 for NGS testing please?    [facility] team, she has a PET done and there is some progression. Can we get images sent over?

## 2024-07-17 ENCOUNTER — OFFICE VISIT (OUTPATIENT)
Dept: HEMATOLOGY/ONCOLOGY | Facility: CLINIC | Age: 49
End: 2024-07-17
Payer: COMMERCIAL

## 2024-07-17 DIAGNOSIS — C18.9 METASTATIC COLON CANCER TO LIVER: Primary | ICD-10-CM

## 2024-07-17 DIAGNOSIS — C78.7 METASTATIC COLON CANCER TO LIVER: Primary | ICD-10-CM

## 2024-07-17 PROCEDURE — 99499 UNLISTED E&M SERVICE: CPT | Mod: 95,,, | Performed by: SURGERY

## 2024-07-17 NOTE — PROGRESS NOTES
Established Patient - Audio Only Telehealth Visit     Quick call with Collette to review her most recent PET and lab results. She has just restarted therapy, and was started on single agent Keytruda 2 weeks ago. By pet (I do not have her images yet) she has progression in her liver which really reflects her time off therapy for her colon resection. At least based on the snippets I've seen, this all looks like progression of her previously known lesions. I would love her on cytotoxic therapy, but she did terribly on it previously so I understand the desire to find something more manageable. Nonetheless I think we need pretty short interval imaging here to make sure we have stable disease on treatment. I am going to see her back in [place] 8/14, which will give her a good 7-8 weeks on therapy and restage with an MRI to discuss how to move forward, whether locoregional treatment or shifting systemic therapy. She has/had DARRYL option, as well as resection ablation options we need to evaluate.     This service was not originating from a related E/M service provided within the previous 7 days nor will  to an E/M service or procedure within the next 24 hours or my soonest available appointment.  Prevailing standard of care was able to be met in this audio-only visit.        Kimo Yuan MD  Upper GI / Hepatobiliary Surgical Oncology  [facility] [place], [place]  Office: [phone]  Fax: [phone]

## 2024-07-18 ENCOUNTER — PATIENT MESSAGE (OUTPATIENT)
Dept: SURGERY | Facility: CLINIC | Age: 49
End: 2024-07-18
Payer: COMMERCIAL

## 2024-07-19 ENCOUNTER — TELEPHONE (OUTPATIENT)
Dept: SURGERY | Facility: CLINIC | Age: 49
End: 2024-07-19
Payer: COMMERCIAL

## 2024-07-19 ENCOUNTER — TELEPHONE (OUTPATIENT)
Dept: INTERVENTIONAL RADIOLOGY/VASCULAR | Facility: CLINIC | Age: 49
End: 2024-07-19
Payer: COMMERCIAL

## 2024-07-19 DIAGNOSIS — C18.9 METASTATIC COLON CANCER TO LIVER: Primary | ICD-10-CM

## 2024-07-19 DIAGNOSIS — C78.7 METASTATIC COLON CANCER TO LIVER: Primary | ICD-10-CM

## 2024-07-19 DIAGNOSIS — C18.7 MALIGNANT NEOPLASM OF SIGMOID COLON: ICD-10-CM

## 2024-07-19 NOTE — TELEPHONE ENCOUNTER
Spoke to pt on phone, she was scheduled on 7/18 for MRI/labs. Per Dr Yuan was told to hold off on MRI/Labs . Pt had PET scan on 7/12/2024, shows no improvement from MWA, liver got worse, tumor got bigger. Verbally understood sending message to Dr Larios staff

## 2024-07-19 NOTE — TELEPHONE ENCOUNTER
Noted    ----- Message from Warren Shaikh RN sent at 7/19/2024 10:47 AM CDT -----  Regarding: FW: pt advice  Contact: Yelena [phone]    ----- Message -----  From: Page Alves  Sent: 7/19/2024  10:33 AM CDT  To: Wellington Hendrickson Staff  Subject: pt advice                                        [facility] calling to advise unable to get in touch with patient to get bloodwork.

## 2024-07-22 ENCOUNTER — TELEPHONE (OUTPATIENT)
Dept: INTERVENTIONAL RADIOLOGY/VASCULAR | Facility: CLINIC | Age: 49
End: 2024-07-22
Payer: COMMERCIAL

## 2024-07-22 NOTE — TELEPHONE ENCOUNTER
Spoke to pt on phone, she said that Dr Yuan requested the PET scan from [facility], guess they are still waiting for it, also she stated that Dr Yuan had scheduled her MRI on 8/14.verbally understood thanks

## 2024-07-24 ENCOUNTER — TELEPHONE (OUTPATIENT)
Dept: SURGERY | Facility: CLINIC | Age: 49
End: 2024-07-24
Payer: COMMERCIAL

## 2024-07-24 NOTE — TELEPHONE ENCOUNTER
----- Message from Nydia Watts RN sent at 7/24/2024  2:26 PM CDT -----  Regarding: FW: pt advice  Contact: [phone]    ----- Message -----  From: Zena Workman  Sent: 7/24/2024   2:16 PM CDT  To: Lissy Malin Staff  Subject: pt advice                                        Pt is returning a missed call from someone in the office and is asking for a return call back soon. Thanks.     Reason for call:miss call      Patient's DX:     Patient requesting call back or [facility] msg: [phone]

## 2024-07-25 ENCOUNTER — PATIENT MESSAGE (OUTPATIENT)
Dept: HEMATOLOGY/ONCOLOGY | Facility: CLINIC | Age: 49
End: 2024-07-25
Payer: COMMERCIAL

## 2024-07-29 ENCOUNTER — TELEPHONE (OUTPATIENT)
Dept: SURGERY | Facility: CLINIC | Age: 49
End: 2024-07-29
Payer: COMMERCIAL

## 2024-07-29 NOTE — TELEPHONE ENCOUNTER
----- Message from Yelena Yeung RN sent at 7/29/2024 11:40 AM CDT -----  Regarding: RE: [facility] blood order pending  I notified [facility] of the MP request, they are putting it in today. No need for a new order.    Bess Ba  ----- Message -----  From: Nydia Watts RN  Sent: 7/25/2024   9:47 AM CDT  To: Yelena Yeung RN; Lissy Malin Staff  Subject: RE: [facility] blood order pending                   Yelena    Can this be changed to mobile phlebotomy ? The plan has a changed  bit with Ms. Castro due to difficulties coordinating imaging, labs, port access, etc. The  patient is now not getting labs here but having her oncologist in [place] access her port  for labs and imaging.     Let me know if you need me to place a new order or if you can switch it.    Thanks!  Nydia  ----- Message -----  From: Yelena Yeung RN  Sent: 7/22/2024  11:03 AM CDT  To: iKmo Yuan MD; Lissy Malin Staff  Subject: [facility] blood order pending                       Hey Dr. Yuan,     I see that you had ordered blood on the pt, but it doesn't look like he has an upcoming lab appt. Please forward to your team to schedule with the pt.    Bess Rm

## 2024-07-30 ENCOUNTER — PATIENT MESSAGE (OUTPATIENT)
Dept: HEMATOLOGY/ONCOLOGY | Facility: CLINIC | Age: 49
End: 2024-07-30
Payer: COMMERCIAL

## 2024-07-30 ENCOUNTER — TELEPHONE (OUTPATIENT)
Dept: HEMATOLOGY/ONCOLOGY | Facility: CLINIC | Age: 49
End: 2024-07-30
Payer: COMMERCIAL

## 2024-07-30 NOTE — TELEPHONE ENCOUNTER
I called patient to review [facility] testing, lab scheduled (confirmed location/date/time), and [facility] financial assistance -approved -won't exceed $100. Email sent to pt for reference. All questions answered and patient thankful for the call. Left direct number if any future questions.

## 2024-08-07 ENCOUNTER — LAB VISIT (OUTPATIENT)
Dept: LAB | Facility: HOSPITAL | Age: 49
End: 2024-08-07
Attending: COLON & RECTAL SURGERY
Payer: COMMERCIAL

## 2024-08-07 ENCOUNTER — OFFICE VISIT (OUTPATIENT)
Dept: SURGERY | Facility: CLINIC | Age: 49
End: 2024-08-07
Payer: COMMERCIAL

## 2024-08-07 VITALS
BODY MASS INDEX: 19.22 KG/M2 | WEIGHT: 134.25 LBS | DIASTOLIC BLOOD PRESSURE: 69 MMHG | HEIGHT: 70 IN | HEART RATE: 95 BPM | OXYGEN SATURATION: 96 % | TEMPERATURE: 97 F | SYSTOLIC BLOOD PRESSURE: 97 MMHG | RESPIRATION RATE: 17 BRPM

## 2024-08-07 DIAGNOSIS — C78.7 METASTASIS TO LIVER: ICD-10-CM

## 2024-08-07 DIAGNOSIS — C18.7 MALIGNANT NEOPLASM OF SIGMOID COLON: Primary | ICD-10-CM

## 2024-08-07 DIAGNOSIS — Z93.2 ILEOSTOMY STATUS: ICD-10-CM

## 2024-08-07 DIAGNOSIS — C18.9 METASTATIC COLON CANCER TO LIVER: ICD-10-CM

## 2024-08-07 DIAGNOSIS — C78.7 METASTATIC COLON CANCER TO LIVER: ICD-10-CM

## 2024-08-07 DIAGNOSIS — C18.7 MALIGNANT NEOPLASM OF SIGMOID COLON: ICD-10-CM

## 2024-08-07 LAB
ALBUMIN SERPL BCP-MCNC: 4.1 G/DL (ref 3.5–5.2)
ALP SERPL-CCNC: 114 U/L (ref 55–135)
ALT SERPL W/O P-5'-P-CCNC: 131 U/L (ref 10–44)
ANION GAP SERPL CALC-SCNC: 9 MMOL/L (ref 8–16)
AST SERPL-CCNC: 94 U/L (ref 10–40)
BASOPHILS # BLD AUTO: 0.06 K/UL (ref 0–0.2)
BASOPHILS NFR BLD: 0.9 % (ref 0–1.9)
BILIRUB SERPL-MCNC: 1.3 MG/DL (ref 0.1–1)
BUN SERPL-MCNC: 33 MG/DL (ref 6–20)
CALCIUM SERPL-MCNC: 9.5 MG/DL (ref 8.7–10.5)
CEA SERPL-MCNC: 22.5 NG/ML (ref 0–5)
CHLORIDE SERPL-SCNC: 101 MMOL/L (ref 95–110)
CO2 SERPL-SCNC: 22 MMOL/L (ref 23–29)
CREAT SERPL-MCNC: 1.6 MG/DL (ref 0.5–1.4)
DIFFERENTIAL METHOD BLD: ABNORMAL
EOSINOPHIL # BLD AUTO: 0.1 K/UL (ref 0–0.5)
EOSINOPHIL NFR BLD: 1.6 % (ref 0–8)
ERYTHROCYTE [DISTWIDTH] IN BLOOD BY AUTOMATED COUNT: 13.9 % (ref 11.5–14.5)
EST. GFR  (NO RACE VARIABLE): 39 ML/MIN/1.73 M^2
GLUCOSE SERPL-MCNC: 102 MG/DL (ref 70–110)
HCT VFR BLD AUTO: 34.4 % (ref 37–48.5)
HGB BLD-MCNC: 11.8 G/DL (ref 12–16)
IMM GRANULOCYTES # BLD AUTO: 0.03 K/UL (ref 0–0.04)
IMM GRANULOCYTES NFR BLD AUTO: 0.5 % (ref 0–0.5)
LYMPHOCYTES # BLD AUTO: 1 K/UL (ref 1–4.8)
LYMPHOCYTES NFR BLD: 15.6 % (ref 18–48)
MCH RBC QN AUTO: 28.7 PG (ref 27–31)
MCHC RBC AUTO-ENTMCNC: 34.3 G/DL (ref 32–36)
MCV RBC AUTO: 84 FL (ref 82–98)
MONOCYTES # BLD AUTO: 0.6 K/UL (ref 0.3–1)
MONOCYTES NFR BLD: 9.7 % (ref 4–15)
NEUTROPHILS # BLD AUTO: 4.6 K/UL (ref 1.8–7.7)
NEUTROPHILS NFR BLD: 71.7 % (ref 38–73)
NRBC BLD-RTO: 0 /100 WBC
PLATELET # BLD AUTO: 352 K/UL (ref 150–450)
PMV BLD AUTO: 9 FL (ref 9.2–12.9)
POTASSIUM SERPL-SCNC: 5 MMOL/L (ref 3.5–5.1)
PROT SERPL-MCNC: 7.3 G/DL (ref 6–8.4)
RBC # BLD AUTO: 4.11 M/UL (ref 4–5.4)
SODIUM SERPL-SCNC: 132 MMOL/L (ref 136–145)
WBC # BLD AUTO: 6.41 K/UL (ref 3.9–12.7)

## 2024-08-07 PROCEDURE — 82378 CARCINOEMBRYONIC ANTIGEN: CPT | Performed by: SURGERY

## 2024-08-07 PROCEDURE — 36415 COLL VENOUS BLD VENIPUNCTURE: CPT | Mod: PN | Performed by: SURGERY

## 2024-08-07 PROCEDURE — 85025 COMPLETE CBC W/AUTO DIFF WBC: CPT | Mod: PN | Performed by: SURGERY

## 2024-08-07 PROCEDURE — 99999 PR PBB SHADOW E&M-EST. PATIENT-LVL IV: CPT | Mod: PBBFAC,,, | Performed by: COLON & RECTAL SURGERY

## 2024-08-07 PROCEDURE — 80053 COMPREHEN METABOLIC PANEL: CPT | Mod: PO | Performed by: SURGERY

## 2024-08-07 NOTE — PROGRESS NOTES
HPI:  Collette Castro is a 49 y.o. female with history of colon CA, sigmod. S/p open LAR, DLI     8- Interval Hx:   Current treatment plan of Pembrolizamab every 3 weeks.   Continues to report high output from ostomy despite lomotil.   Has appointment and MRI scheduled next week with Dr. Yuan     History of cervical CA.  Cisplatin and XRT.   Surgery - Treated with pinning of ovaries upper abd, lymphadenectomy and XRT (interstitial, EBRT?) in her late 20's    Asymptomatic  Screening colonoscopies.         12-  [facility]    Staging workup revealed liver metastases.     1- CT scan:    Multiple liver hypodensities  Paracolic gutter soft tissue densities (likely ovaries per pt)        1-  MRI abdomen          *4 cycles of chemoRX with reactions after 1st cycle - see Dr Mathias note:       *Oxaliplatin stopped.  QOW 5 FU       *Excellent response with only one visible lesion seen - decreased in size      4-  PET           4-  MRI abdomen       4-  Interval hx:    Here today for consideration of colorectal resection  No abd pain.  No rectal bleeding.  No wt loss.    Good activity level.   No CP after Oxaliplatin stopped.      4-  Interval hx:  Again remains asymptomatic.  No abdominal pain or bleeding.       5-9-2024    History of robotic, open LAR, DLI  Collected: 05/09/24 1454   Result status: Final   Resulting lab: [facility]   Value: 1. Sigmoid colon and portion of rectum (sigmoid colectomy, low anterior resection):  Invasive adenocarcinoma, status posttreatment.  See cancer synoptic report below    2. Distal rectal margin (excision):    No dysplasia, no malignancy.    3. Portion of mesorectum (excision):  Negative for tumor  Fibroconnective and adipose tissue    4. Portion of rectum (excision):    No dysplasia.  No carcinoma.  Benign rectum with reactive changes and erosion, extensive adhesions    5. Proximal anastomotic  ring (excision):    No dysplasia.  No carcinoma.  Benign colonic mucosa    6. Distal anastomotic ring (excision):  No dysplasia.  No carcinoma benign rectal mucosa       Cancer synoptic report  Procedure:  Sigmoid colectomy, low anterior resection  Tumor site:  Sigmoid  Histologic type: Adenocarcinoma  Histologic grade:  G2, moderately differentiated  Tumor size:  2.8 x 2.0 cm  Tumor extent:  Invades through muscularis propria into pericolonic tissue  Macroscopic tumor perforation: Not identified  Lymphatic and/or vascular invasion:  Present, small vessel  Perineural invasion: Not identified  Tumor budding score: Intermediate (8)  Treatment effect:  Present with residual cancer showing evident tumor regression but more than rare small groups (partial response, score 2)    Margins  All margins negative for dysplasia  All margins negative for invasive carcinoma.      pTNM Classification (AJCC 8th edition)  Modified classification: y(post-neoadjuvant therapy)    pT Category  pT3:  Tumor invades through the muscularis propria into pericolorectal tissues    pN Category  pN0:  No regional lymph node metastasis  Number of lymph nodes examined:  Twelve (12)  Tumor deposits: Not identified    pM Category  No tissue submitted.  Given history of synchronous liver metastasis with interval reduction/resolution per 04/10/2024 MRI.    Tumor blocks:  1C-I.  Best tumor block 1I    Ancillary studies:  Unknown if performed on prior outside material. Epic message sent to Chas[facility] (Hematology oncology).   Comment: Interp By Nora Barney M.D., Signed on 05/27/2024 at 14:46   *Additional information available - comment, narrative       5-   Interval hx:    SOB with exertion.  No CP  High output, loose  No N/V.  No abd pain.  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  Analyses with Cancer[employer]-Expanded (77 genes)    Pre-diabetes     Sphincter of Oddi dysfunction         Past Surgical History:   Procedure Laterality Date    CHOLECYSTECTOMY      COLONOSCOPY      CYSTOSCOPY W/ URETERAL STENT PLACEMENT Bilateral 5/9/2024    Procedure: CYSTOSCOPY, WITH URETERAL STENT INSERTION;  Surgeon: Cas Bang MD;  [facility];  Service: Urology;  Laterality: Bilateral;    EXPLORATORY LAPAROTOMY      FLEXIBLE SIGMOIDOSCOPY N/A 5/9/2024    Procedure: SIGMOIDOSCOPY, FLEXIBLE;  Surgeon: SANTIAGO Paris MD;  [facility];  Service: Colon and Rectal;  Laterality: N/A;    LOW ANTERIOR RESECTION OF COLON N/A 5/9/2024    Procedure: RESECTION, COLON, LOW ANTERIOR, robotic converted to open;  Surgeon: SANTIAGO Paris MD;  [facility];  Service: Colon and Rectal;  Laterality: N/A;    LYSIS OF ADHESIONS  5/9/2024    Procedure: LYSIS, ADHESIONS;  Surgeon: SANTIAGO Paris MD;  [facility];  Service: Colon and Rectal;;  extensive, greater  than 2 hours; 22 modifier    MOBILIZATION, SPLENIC FLEXURE, LAPAROSCOPIC  5/9/2024    Procedure: MOBILIZATION, SPLENIC FLEXURE, LAPAROSCOPIC;  Surgeon: SANTIAGO Paris MD;  [facility];  Service: Colon and Rectal;;  22 modifier; greater than 2 hours    OVARY SURGERY      relocation    ROBOT-ASSISTED LAPAROSCOPIC RESECTION OF SIGMOID COLON USING DA ESTEFANI XI N/A 5/9/2024    Procedure: XI ROBOTIC COLECTOMY, SIGMOID;  Surgeon: SANTIAGO Paris MD;  [facility];  Service: Colon and Rectal;  Laterality: N/A;  converted to open    thryoid nodule         Review of patient's allergies indicates:   Allergen Reactions    Codeine Other (See Comments)     Per patient, causes pancreatitis    Hydrocodone bitartrate     Hydrocodone-acetaminophen Other (See Comments)    Opioids - morphine analogues      Chest pain/Okay w/Fentanyl 50 mcg - received in ED x3 over 6 hours without issues.     Oxycodone      Pancreatitis w/ all oral narctotics    Tramadol hcl      pancreatitis       Family History   Problem Relation Name Age of Onset    Colon cancer Maternal Grandfather  67    Lymphoma Maternal Cousin      Lung cancer Other         Social History     Socioeconomic History    Marital status:    Tobacco Use    Smoking status: Never    Smokeless tobacco: Never   Substance and Sexual Activity    Alcohol use: Not Currently     Comment: once a month    Drug use: Never    Sexual activity: Yes     Partners: Male     Birth control/protection: See Surgical Hx     Social Determinants of Health     Financial Resource Strain: Low Risk  (5/16/2024)    Overall Financial Resource Strain (CARDIA)     Difficulty of Paying Living Expenses: Not hard at all   Food Insecurity: No Food Insecurity (5/16/2024)    Hunger Vital Sign     Worried About Running Out of Food in the Last Year: Never true     Ran Out of Food in the Last Year: Never true   Transportation Needs: No Transportation Needs (5/16/2024)    TRANSPORTATION NEEDS     Transportation : No   Physical Activity: Insufficiently Active (5/16/2024)    Exercise Vital Sign     Days of Exercise per Week: 3 days     Minutes of Exercise per Session: 30 min   Stress: No Stress Concern Present (5/16/2024)    [place] [employer] - Occupational Stress Questionnaire     Feeling of Stress : Not at all   Housing Stability: Low Risk  (5/16/2024)    Housing Stability Vital Sign     Unable to Pay for Housing in the Last Year: No     Homeless in the Last Year: No       ROS:  GENERAL: No fever, chills, fatigability or weight loss.  Integument: No rashes, redness, icterus  CHEST: Denies VILLARREAL, cyanosis, wheezing, cough and sputum production.  CARDIOVASCULAR: Denies chest pain, PND, orthopnea or reduced exercise tolerance.  GI: Denies abd pain, dysphagia, nausea, vomiting, no hematemesis   : Denies burning on urination, no hematuria, no bacteriuria  MSK: No  deformities, swelling, joint pain swelling  Neurologic: No HAs, seizures, weakness, paresthesias, gait problems    PE:  General appearance well  There were no vitals taken for this visit.  Sclera/ Skin anicteric  LN none palpable  AT NC EOMI  Neck supple trachea midline   Chest symmetric, nl excursion, no retractions, breathing comfortably  Abdomen        Stoma pink and viable  Wound clean dry intact  ND soft NT.  no masses, no organomegaly  EXT - no CCE, no calf tenderness  Neuro:  Mood/ affect nl, alert and oriented x 3, moves all ext's, gait nl      Assessment:  Good performance status  Wound healed  Dehydration?  Stoma output high?    H/o metastatic colon cancer - liver metastases - for RFA of liver lesion      Plan:  Check labs  Imodium BID  We will discuss timing of ileostomy closure.  Given the patient's problems with ileostomy dysfunction and renal function impairment I believe closure is reasonable option  OV 6 weeks

## 2024-08-09 ENCOUNTER — PATIENT MESSAGE (OUTPATIENT)
Dept: SURGERY | Facility: CLINIC | Age: 49
End: 2024-08-09
Payer: COMMERCIAL

## 2024-08-09 ENCOUNTER — DOCUMENTATION ONLY (OUTPATIENT)
Dept: SURGERY | Facility: CLINIC | Age: 49
End: 2024-08-09
Payer: COMMERCIAL

## 2024-08-12 DIAGNOSIS — C18.7 MALIGNANT NEOPLASM OF SIGMOID COLON: Primary | ICD-10-CM

## 2024-08-14 ENCOUNTER — OFFICE VISIT (OUTPATIENT)
Dept: SURGERY | Facility: CLINIC | Age: 49
End: 2024-08-14
Payer: COMMERCIAL

## 2024-08-14 ENCOUNTER — HOSPITAL ENCOUNTER (OUTPATIENT)
Dept: RADIOLOGY | Facility: HOSPITAL | Age: 49
Discharge: HOME | End: 2024-08-14
Attending: COLON & RECTAL SURGERY
Payer: COMMERCIAL

## 2024-08-14 ENCOUNTER — HOSPITAL ENCOUNTER (OUTPATIENT)
Dept: RADIOLOGY | Facility: HOSPITAL | Age: 49
Discharge: HOME | End: 2024-08-14
Attending: SURGERY
Payer: COMMERCIAL

## 2024-08-14 VITALS
WEIGHT: 136.88 LBS | TEMPERATURE: 97 F | OXYGEN SATURATION: 95 % | DIASTOLIC BLOOD PRESSURE: 65 MMHG | HEIGHT: 70 IN | RESPIRATION RATE: 17 BRPM | SYSTOLIC BLOOD PRESSURE: 115 MMHG | BODY MASS INDEX: 19.6 KG/M2 | HEART RATE: 88 BPM

## 2024-08-14 VITALS
HEART RATE: 98 BPM | DIASTOLIC BLOOD PRESSURE: 69 MMHG | SYSTOLIC BLOOD PRESSURE: 108 MMHG | WEIGHT: 136.25 LBS | BODY MASS INDEX: 19.55 KG/M2 | OXYGEN SATURATION: 99 %

## 2024-08-14 DIAGNOSIS — C18.7 MALIGNANT NEOPLASM OF SIGMOID COLON: ICD-10-CM

## 2024-08-14 DIAGNOSIS — C18.9 METASTATIC COLON CANCER TO LIVER: Primary | ICD-10-CM

## 2024-08-14 DIAGNOSIS — C78.7 METASTASIS TO LIVER: Primary | ICD-10-CM

## 2024-08-14 DIAGNOSIS — Z93.2 ILEOSTOMY STATUS: ICD-10-CM

## 2024-08-14 DIAGNOSIS — C78.7 METASTATIC COLON CANCER TO LIVER: Primary | ICD-10-CM

## 2024-08-14 DIAGNOSIS — C78.7 METASTATIC COLON CANCER TO LIVER: ICD-10-CM

## 2024-08-14 DIAGNOSIS — C18.9 METASTATIC COLON CANCER TO LIVER: ICD-10-CM

## 2024-08-14 PROCEDURE — 74177 CT ABD & PELVIS W/CONTRAST: CPT | Mod: TC

## 2024-08-14 PROCEDURE — A9585 GADOBUTROL INJECTION: HCPCS | Performed by: SURGERY

## 2024-08-14 PROCEDURE — 74183 MRI ABD W/O CNTR FLWD CNTR: CPT | Mod: 26,,, | Performed by: RADIOLOGY

## 2024-08-14 PROCEDURE — 74183 MRI ABD W/O CNTR FLWD CNTR: CPT | Mod: TC

## 2024-08-14 PROCEDURE — 99999 PR PBB SHADOW E&M-EST. PATIENT-LVL IV: CPT | Mod: PBBFAC,,, | Performed by: COLON & RECTAL SURGERY

## 2024-08-14 PROCEDURE — 25500020 PHARM REV CODE 255: Performed by: COLON & RECTAL SURGERY

## 2024-08-14 PROCEDURE — 74177 CT ABD & PELVIS W/CONTRAST: CPT | Mod: 26,,, | Performed by: INTERNAL MEDICINE

## 2024-08-14 PROCEDURE — 99214 OFFICE O/P EST MOD 30 MIN: CPT | Mod: S$GLB,,, | Performed by: SURGERY

## 2024-08-14 PROCEDURE — 99999 PR PBB SHADOW E&M-EST. PATIENT-LVL III: CPT | Mod: PBBFAC,,, | Performed by: SURGERY

## 2024-08-14 PROCEDURE — 99214 OFFICE O/P EST MOD 30 MIN: CPT | Mod: S$GLB,,, | Performed by: COLON & RECTAL SURGERY

## 2024-08-14 PROCEDURE — 25500020 PHARM REV CODE 255: Performed by: SURGERY

## 2024-08-14 RX ORDER — GADOBUTROL 604.72 MG/ML
10 INJECTION INTRAVENOUS
Status: COMPLETED | OUTPATIENT
Start: 2024-08-14 | End: 2024-08-14

## 2024-08-14 RX ORDER — TRETINOIN 0.25 MG/G
CREAM TOPICAL NIGHTLY
COMMUNITY
Start: 2024-07-23

## 2024-08-14 RX ORDER — DIPHENOXYLATE HYDROCHLORIDE AND ATROPINE SULFATE 2.5; .025 MG/1; MG/1
2 TABLET ORAL 4 TIMES DAILY
COMMUNITY

## 2024-08-14 RX ADMIN — DIATRIZOATE MEGLUMINE AND DIATRIZOATE SODIUM 30 ML: 660; 100 LIQUID ORAL; RECTAL at 08:08

## 2024-08-14 RX ADMIN — DIATRIZOATE MEGLUMINE AND DIATRIZOATE SODIUM 15 ML: 660; 100 LIQUID ORAL; RECTAL at 06:08

## 2024-08-14 RX ADMIN — GADOBUTROL 10 ML: 604.72 INJECTION INTRAVENOUS at 09:08

## 2024-08-14 RX ADMIN — IOHEXOL 75 ML: 350 INJECTION, SOLUTION INTRAVENOUS at 08:08

## 2024-08-14 NOTE — PROGRESS NOTES
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 She has been working up a sigmoid polyp/mass for about a year, with multiple biopsies negative for malignancy despite a high risk clinical lesion. Repeat colonoscopy and biopsies in December finally demonstrated adenocarcinoma.  She has hx uterine cancer at age 24 (1999) managed with preoperative RT and then hysterectomy/lymphadenectomy with ovarian preservation/relocation. Because of this history and her family history she has been getting screening colonoscopy every 3 years.     Family Hx of CRC (Grandfather- age 67)     Colonoscopy 12/23 with 2-3cm ulcerated sessile polyp.       CT/MRI:               On my review I count at least 5 discrete lesions, the largest of which is about 1cm in diameter in the right anterior liver. These are all in the right liver, with a potential 6th lesion on DWI in the left lateral liver. Her imaging reveals no other clear sites of metastatic disease. She has bilateral paracolic gutter "collections" which on my review are actually likely her relocated ovaries.     CEA: 2.2 (wnl)     PET: report and images pending, unfortunately her disc has only partial images     A/P:  Collette is 48, healthy, with hx pelvic RT and a new sigmoid colon cancer with liver metastases clinically. We had a long discussion on the phone recently and reviewed that discussion again here. Plan to start her on systemic therapy 4-6 cycles and review her response, and I have discussed her case with Dr. Mathias. We reviewed our likely options after induction systemic therapy. She has multifocal disease, mostly confined to the right liver. These are all high risk for disappearing lesions on chemo, and she has extremely high risk for in liver recurrence or inadequate surgical/interventional clearance. I see no evidence of extrahepatic disease. We discussed locoregional strategies " including resection, ablation, Y90 and importantly in her case, adjuvant DARRYL pump therapy and the potential role for liver transplant in the future should she have recurrent disease. Of these, given the distribution I think our most likely path is to proceed with primary tumor resection and liver clearance to the best of our ability with placement of DARRYL pump for adjuvant liver directed therapy for residual disease.     RTC after 4 cycles systemic therapy with restaging CT C/A/P panc/liver protocol and labs. She may benefit from MRI as well but will plan to evaluate her CT imaging first. Will obtain her PET for review, and re-present at [facility] conference after restaging.  -------------------------  4/17/2024:     Collette returns after a tough few cycles of systemic therapy which she has really struggled with. Dr. Mathias has dropped her bolus 5-FU and she has had several delays due to thrombocytopenia, elevated LFTs and diarrhea.     Repeat MRI:       She has responded great, I can only see one lesion on DWI. Seen Dr. Paris to restage her rectosigmoid mass. I suspect the answer here is just to treat her sigmoid cancer and ablate this solitary liver lesion and hold off on DARRYL placement. She understands high risk of intrahepatic recurrence and potential need for further liver directed therapy.     Present at [facility]  F/u Dr. Paris flex sig and surgical planning. Will regroup after conference Thursday, I think a VV to keep her on the radar.  ----------------------------------------  4/29/2024:     Collette has been presented at [facility] and primary colorectal conference. Will proceed with MWA of her lone remaining liver tumor and move forward with primary resection. Cont to follow her liver closely.  -------------------------------  7/17/2024:    Since our last visit Collette underwent primary resection with loop ileostomy and MWA of her liver lesion.    We reviewed her most recent PET and lab results. She has just restarted  therapy, and was started on single agent Keytruda 2 weeks ago. By pet (I do not have her images yet) she has progression in her liver which really reflects her time off therapy for her colon resection. At least based on the snippets I've seen, this all looks like progression of her previously known lesions. I would love her on cytotoxic therapy, but she did terribly on it previously so I understand the desire to find something more manageable. Nonetheless I think we need pretty short interval imaging here to make sure we have stable disease on treatment. I am going to see her back in [place] , which will give her a good 7-8 weeks on therapy and restage with an MRI to discuss how to move forward, whether locoregional treatment or shifting systemic therapy. She has/had DARRYL option, as well as resection ablation options we need to evaluate.   ----------------------------------  2024:    Collette returns now with repeat staging after about 7 weeks on immunotherapy for her recurrent colorectal cancer to the liver. Her CEA is now 22 which is elevated but I dont have a good series to see a trend on immunotherapy.    Patient has had issue with dehydration due to increased ostomy output. She is doing well otherwise. No SOB or VILLARREAL. Doing well with diet, no n/v. Still works as NP.     Repeat imagin  CT:  - History of colon cancer status post LAR and diverting loop ileostomy.  No evidence of bowel obstruction or anastomotic leak.  - History of cervical cancer status post partial hysterectomy, ovarian pinning, and lymph node dissection.  - No lymphadenopathy.  - Post ablation changes in the right hepatic lobe.  Multiple new liver lesions measuring up to 1.8 cm, concerning for worsening metastases.      Her tempus did return with a FANCC mutation so we may have clinical trial options down the line related to this:      A/P:  48yo with progression of colorectal liver metastases on single agent keytruda. I  have discussed her case with Basilia Paris and Mey. She is not handling her diverting loop ileostomy well, with recurrent GRAYSON. We need to get her back on systemic chemotherapy asap, but I doubt we can accomplish this with her loop. Dr. Paris is willing to move expeditiously to close her ostomy, and Dr. Mathias will restart her on cytotoxic therapy thereafter. If we can stabilize her, at her next restaging we discussed moving towards resection/ablation and DARRYL placement for adjuvant therapy. She lives in [place], I would like to get her to see Dr. Palacios on her next restaging to discuss pump therapy.    I spent 30 minutes with Ms. Castro, with >50% of time spent face to face and additional time preparing to see the patient (eg, review of tests), obtaining and/or reviewing separately obtained history, documenting clinical information in the electronic or other health record, independently interpreting results (not separately reported) and communicating results to the patient/family/caregiver, or Care coordination (not separately reported).       Kimo Yuan MD  Upper GI / Hepatobiliary Surgical Oncology  [facility] [place], [place]  Office: [phone]  Fax: [phone]

## 2024-08-14 NOTE — Clinical Note
Hey team Collette needs to see Bright when she comes back for restaging to discuss DARRYL. She is from [place] so I want her to have a good chat about logistics before we commit to a pump.

## 2024-08-15 ENCOUNTER — DOCUMENTATION ONLY (OUTPATIENT)
Dept: SURGERY | Facility: CLINIC | Age: 49
End: 2024-08-15
Payer: COMMERCIAL

## 2024-08-15 NOTE — PROGRESS NOTES
Staff message sent to PEDRO Mercer with request to add pt to [facility] conference agenda.

## 2024-08-19 ENCOUNTER — PATIENT MESSAGE (OUTPATIENT)
Dept: SURGERY | Facility: CLINIC | Age: 49
End: 2024-08-19
Payer: COMMERCIAL

## 2024-08-22 ENCOUNTER — DOCUMENTATION ONLY (OUTPATIENT)
Dept: HEMATOLOGY/ONCOLOGY | Facility: CLINIC | Age: 49
End: 2024-08-22
Payer: COMMERCIAL

## 2024-08-22 NOTE — PROGRESS NOTES
[facility]     Colorectal Liver Metastasis Tumor Board Note      Date: 08/22/2024    Referring Provider: Dr. Yuan     Case Overview: Mrs. Castro (49F) was initially diagnosed in 12/2023 with sigmoid adenocarcinoma with synchronous liver metastasis. She started FOLFOX in 02/2024 with modifications after adverse reaction to cycle 1. Underwent LAR with loop ileostomy in 5/2024 followed by liver ablation in 6/2024. Started Keytruda in 7/2024. Has germline mutation of FANCC gene.     Participants: medical oncology, surgical oncology, colorectal surgery, transplant surgeons, interventional radiology, and oncology navigator     Imaging Reviewed: MRI, CT    Radiology Review: Treated seg 5 lesion without residual. However, multiple new tumors are present throughout the right hepatic lobe, not present on pretreament imaging. Persistent peritoneal soft tissue foci (present since 2008). No evidence of extrahepatic disease.    Orders/Referrals: none.     Board Recommendations: Recommend discontinuing Keytruda. Consider ileostomy reversal followed by restarting chemotherapy. Restage after 2-3 months.

## 2024-08-23 ENCOUNTER — TELEPHONE (OUTPATIENT)
Dept: SURGERY | Facility: CLINIC | Age: 49
End: 2024-08-23
Payer: COMMERCIAL

## 2024-08-23 DIAGNOSIS — C18.7 MALIGNANT NEOPLASM OF SIGMOID COLON: Primary | ICD-10-CM

## 2024-08-23 RX ORDER — CIPROFLOXACIN 500 MG/1
500 TABLET ORAL 2 TIMES DAILY
Qty: 2 TABLET | Refills: 0 | Status: SHIPPED | OUTPATIENT
Start: 2024-08-23

## 2024-08-23 RX ORDER — METRONIDAZOLE 500 MG/1
500 TABLET ORAL 2 TIMES DAILY
Qty: 2 TABLET | Refills: 0 | Status: SHIPPED | OUTPATIENT
Start: 2024-08-23

## 2024-08-23 NOTE — TELEPHONE ENCOUNTER
Reviewed pre op instructions -- answered questions to patient's satisfaction. Pt voiced understanding of surgery location and prep.

## 2024-08-28 ENCOUNTER — OFFICE VISIT (OUTPATIENT)
Dept: SURGERY | Facility: CLINIC | Age: 49
End: 2024-08-28
Payer: COMMERCIAL

## 2024-08-28 DIAGNOSIS — C18.9 METASTATIC COLON CANCER TO LIVER: Primary | ICD-10-CM

## 2024-08-28 DIAGNOSIS — C78.7 METASTATIC COLON CANCER TO LIVER: Primary | ICD-10-CM

## 2024-08-28 PROCEDURE — 99499 UNLISTED E&M SERVICE: CPT | Mod: 95,,, | Performed by: SURGERY

## 2024-08-28 NOTE — PROGRESS NOTES
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 She has been working up a sigmoid polyp/mass for about a year, with multiple biopsies negative for malignancy despite a high risk clinical lesion. Repeat colonoscopy and biopsies in December finally demonstrated adenocarcinoma.  She has hx uterine cancer at age 24 (1999) managed with preoperative RT and then hysterectomy/lymphadenectomy with ovarian preservation/relocation. Because of this history and her family history she has been getting screening colonoscopy every 3 years.     Family Hx of CRC (Grandfather- age 67)     Colonoscopy 12/23 with 2-3cm ulcerated sessile polyp.       CT/MRI:               On my review I count at least 5 discrete lesions, the largest of which is about 1cm in diameter in the right anterior liver. These are all in the right liver, with a potential 6th lesion on DWI in the left lateral liver. Her imaging reveals no other clear sites of metastatic disease. She has bilateral paracolic gutter "collections" which on my review are actually likely her relocated ovaries.     CEA: 2.2 (wnl)     PET: report and images pending, unfortunately her disc has only partial images     A/P:  Collette is 48, healthy, with hx pelvic RT and a new sigmoid colon cancer with liver metastases clinically. We had a long discussion on the phone recently and reviewed that discussion again here. Plan to start her on systemic therapy 4-6 cycles and review her response, and I have discussed her case with Dr. Mathias. We reviewed our likely options after induction systemic therapy. She has multifocal disease, mostly confined to the right liver. These are all high risk for disappearing lesions on chemo, and she has extremely high risk for in liver recurrence or inadequate surgical/interventional clearance. I see no evidence of extrahepatic disease. We discussed locoregional strategies " including resection, ablation, Y90 and importantly in her case, adjuvant DARRYL pump therapy and the potential role for liver transplant in the future should she have recurrent disease. Of these, given the distribution I think our most likely path is to proceed with primary tumor resection and liver clearance to the best of our ability with placement of DARRYL pump for adjuvant liver directed therapy for residual disease.     RTC after 4 cycles systemic therapy with restaging CT C/A/P panc/liver protocol and labs. She may benefit from MRI as well but will plan to evaluate her CT imaging first. Will obtain her PET for review, and re-present at [facility] conference after restaging.  -------------------------  4/17/2024:     Collette returns after a tough few cycles of systemic therapy which she has really struggled with. Dr. Mathias has dropped her bolus 5-FU and she has had several delays due to thrombocytopenia, elevated LFTs and diarrhea.     Repeat MRI:       She has responded great, I can only see one lesion on DWI. Seen Dr. Paris to restage her rectosigmoid mass. I suspect the answer here is just to treat her sigmoid cancer and ablate this solitary liver lesion and hold off on DARRYL placement. She understands high risk of intrahepatic recurrence and potential need for further liver directed therapy.     Present at [facility]  F/u Dr. Paris flex sig and surgical planning. Will regroup after conference Thursday, I think a VV to keep her on the radar.  ----------------------------------------  4/29/2024:     Collette has been presented at [facility] and primary colorectal conference. Will proceed with MWA of her lone remaining liver tumor and move forward with primary resection. Cont to follow her liver closely.  -------------------------------  7/17/2024:     Since our last visit Collette underwent primary resection with loop ileostomy and MWA of her liver lesion.     We reviewed her most recent PET and lab results. She has just restarted  therapy, and was started on single agent Keytruda 2 weeks ago. By pet (I do not have her images yet) she has progression in her liver which really reflects her time off therapy for her colon resection. At least based on the snippets I've seen, this all looks like progression of her previously known lesions. I would love her on cytotoxic therapy, but she did terribly on it previously so I understand the desire to find something more manageable. Nonetheless I think we need pretty short interval imaging here to make sure we have stable disease on treatment. I am going to see her back in [place] , which will give her a good 7-8 weeks on therapy and restage with an MRI to discuss how to move forward, whether locoregional treatment or shifting systemic therapy. She has/had DARRYL option, as well as resection ablation options we need to evaluate.   ----------------------------------  2024:     Collette returns now with repeat staging after about 7 weeks on immunotherapy for her recurrent colorectal cancer to the liver. Her CEA is now 22 which is elevated but I dont have a good series to see a trend on immunotherapy.     Patient has had issue with dehydration due to increased ostomy output. She is doing well otherwise. No SOB or VILLARREAL. Doing well with diet, no n/v. Still works as NP.      Repeat imagin  CT:  - History of colon cancer status post LAR and diverting loop ileostomy.  No evidence of bowel obstruction or anastomotic leak.  - History of cervical cancer status post partial hysterectomy, ovarian pinning, and lymph node dissection.  - No lymphadenopathy.  - Post ablation changes in the right hepatic lobe.  Multiple new liver lesions measuring up to 1.8 cm, concerning for worsening metastases.        Her tempus did return with a FANCC mutation so we may have clinical trial options down the line related to this:       A/P:  50yo with progression of colorectal liver metastases on single agent keytruda.  I have discussed her case with Basilia Paris and Mey. She is not handling her diverting loop ileostomy well, with recurrent GRAYSON. We need to get her back on systemic chemotherapy asap, but I doubt we can accomplish this with her loop. Dr. Paris is willing to move expeditiously to close her ostomy, and Dr. Mathias will restart her on cytotoxic therapy thereafter. If we can stabilize her, at her next restaging we discussed moving towards resection/ablation and DARRYL placement for adjuvant therapy. She lives in [place], I would like to get her to see Dr. Palacios on her next restaging to discuss pump therapy.  -------------------------  8/28/2024:    Quick review for Collette. Her last imaging pretty clearly shows progression. I have talked to Dr. Paris who intends to close her stoma tomorrow. I have talked with Dr. Mathias who will restart her on systemic chemotherapy thereafter. We discussed resection/ablation +/- DARRYL, which I do think benefits her, if we can stabilize her disease. I would like her to meet with Dr. Palacios at our next in person visit to discuss DARRYL. Followup in 3mos with restaging scans.      Kimo Yuan MD  Upper GI / Hepatobiliary Surgical Oncology  [facility] [place], [place]  Office: [phone]  Fax: [phone]

## 2024-08-30 PROBLEM — Z98.890 S/P CLOSURE OF ILEOSTOMY: Status: ACTIVE | Noted: 2024-08-30

## 2024-08-30 NOTE — PROGRESS NOTES
HPI:  Collette Castro is a 49 y.o. female with history of stage IV sigmoid colon cancer    Seen following her CT scan with rectal contrast - no leak.           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 Analyses with [employer]-Expanded (77 genes)    Pre-diabetes     Sphincter of Oddi dysfunction         Past Surgical History:   Procedure Laterality Date    CHOLECYSTECTOMY      COLONOSCOPY      CYSTOSCOPY W/ URETERAL STENT PLACEMENT Bilateral 5/9/2024    Procedure: CYSTOSCOPY, WITH URETERAL STENT INSERTION;  Surgeon: Cas Bang MD;  [facility];  Service: Urology;  Laterality: Bilateral;    EXPLORATORY LAPAROTOMY      FLEXIBLE SIGMOIDOSCOPY N/A 5/9/2024    Procedure: SIGMOIDOSCOPY, FLEXIBLE;  Surgeon: SANTIAGO Paris MD;  [facility];  Service: Colon and Rectal;  Laterality: N/A;    ILEOSTOMY CLOSURE N/A 8/29/2024    Procedure: CLOSURE, ILEOSTOMY;  Surgeon: SANTIAGO Paris MD;  [facility];  Service: Colon and Rectal;  Laterality: N/A;    LOW ANTERIOR RESECTION OF COLON N/A 5/9/2024    Procedure: RESECTION, COLON, LOW ANTERIOR, robotic converted to open;  Surgeon: SANTIAGO Paris MD;  [facility];  Service: Colon and Rectal;  Laterality: N/A;    LYSIS OF ADHESIONS  5/9/2024    Procedure: LYSIS, ADHESIONS;  Surgeon: SANTIAGO Paris MD;  [facility]: [facility];  Service: Colon and Rectal;;  extensive, greater  than 2 hours; 22 modifier    MOBILIZATION, SPLENIC FLEXURE, LAPAROSCOPIC  5/9/2024    Procedure: MOBILIZATION, SPLENIC FLEXURE, LAPAROSCOPIC;  Surgeon: SANTIAGO Paris MD;  [facility];  Service: Colon and Rectal;;  22 modifier; greater than 2 hours    OVARY SURGERY      relocation    ROBOT-ASSISTED LAPAROSCOPIC RESECTION OF SIGMOID COLON USING DA ESTEFANI XI N/A 5/9/2024    Procedure: XI  ROBOTIC COLECTOMY, SIGMOID;  Surgeon: SANTIAGO Paris MD;  Location: [facility] OR [address] [place] [address];  Service: Colon and Rectal;  Laterality: N/A;  converted to open    thryoid nodule         Review of patient's allergies indicates:   Allergen Reactions    Codeine Other (See Comments)     Per patient, causes pancreatitis    Hydrocodone bitartrate     Hydrocodone-acetaminophen Other (See Comments)    Opioids - morphine analogues      Chest pain/Okay w/Fentanyl 50 mcg - received in ED x3 over 6 hours without issues.    Oxycodone      Pancreatitis w/ all oral narctotics    Tramadol hcl      pancreatitis       Family History   Problem Relation Name Age of Onset    Colon cancer Maternal Grandfather  67    Lymphoma Maternal Cousin      Lung cancer Other         Social History     Socioeconomic History    Marital status:    Tobacco Use    Smoking status: Never    Smokeless tobacco: Never   Substance and Sexual Activity    Alcohol use: Not Currently     Comment: once a month    Drug use: Never    Sexual activity: Yes     Partners: Male     Birth control/protection: See Surgical Hx     Social Determinants of Health     Financial Resource Strain: Low Risk  (5/16/2024)    Overall Financial Resource Strain (CARDIA)     Difficulty of Paying Living Expenses: Not hard at all   Food Insecurity: No Food Insecurity (8/30/2024)    Hunger Vital Sign     Worried About Running Out of Food in the Last Year: Never true     Ran Out of Food in the Last Year: Never true   Transportation Needs: No Transportation Needs (8/30/2024)    TRANSPORTATION NEEDS     Transportation : No   Physical Activity: Insufficiently Active (5/16/2024)    Exercise Vital Sign     Days of Exercise per Week: 3 days     Minutes of Exercise per Session: 30 min   Stress: No Stress Concern Present (5/16/2024)    [employer] - Occupational Stress Questionnaire     Feeling of Stress : Not at all   Housing Stability: Low Risk  (8/30/2024)    Housing  "Stability Vital Sign     Unable to Pay for Housing in the Last Year: No     Homeless in the Last Year: No       ROS:  GENERAL: No fever, chills, fatigability or weight loss.  Integument: No rashes, redness, icterus  CHEST: Denies VILLARREAL, cyanosis, wheezing, cough and sputum production.  CARDIOVASCULAR: Denies chest pain, PND, orthopnea or reduced exercise tolerance.  GI: Denies abd pain, dysphagia, nausea, vomiting, no hematemesis   : Denies burning on urination, no hematuria, no bacteriuria  MSK: No deformities, swelling, joint pain swelling  Neurologic: No HAs, seizures, weakness, paresthesias, gait problems    PE:  General appearance well  /65 (BP Location: Right arm, Patient Position: Sitting, BP Method: Medium (Automatic))   Pulse 88   Temp 96.8 °F (36 °C) (Temporal)   Resp 17   Ht 5' 10" (1.778 m)   Wt 62.1 kg (136 lb 14.5 oz)   SpO2 95%   BMI 19.64 kg/m²   Sclera/ Skin anicteric  LN none palpable  AT NC EOMI  Neck supple trachea midline   Chest symmetric, nl excursion, no retractions, breathing Dicomfortably  Abdomen  ND soft NT.  no masses, no organomegaly  EXT - no CCE  Neuro:  Mood/ affect nl, alert and oriented x 3, moves all ext's, gait nl    Assessment:  Chemotherapy holiday  Healed anastomosis    Plan:  Discussed with Dr Yuan - all agree to proceed with closure of ileostomy  Discussed the details of loop closure with pt .  Expected hospital course and recuperation discussed.  Also explained diet after surgery - restricted diet for 4 days:- ileostomy diet.  Day 5 regular diet.    Risks of bleeding, infection, anastomotic leak, reoperation discussed in detail.with pt and her         "

## 2024-09-03 ENCOUNTER — HOSPITAL ENCOUNTER (EMERGENCY)
Facility: HOSPITAL | Age: 49
Discharge: HOME | End: 2024-09-03
Attending: STUDENT IN AN ORGANIZED HEALTH CARE EDUCATION/TRAINING PROGRAM
Payer: COMMERCIAL

## 2024-09-03 VITALS
RESPIRATION RATE: 18 BRPM | BODY MASS INDEX: 19.04 KG/M2 | HEIGHT: 70 IN | TEMPERATURE: 99 F | DIASTOLIC BLOOD PRESSURE: 64 MMHG | HEART RATE: 112 BPM | SYSTOLIC BLOOD PRESSURE: 124 MMHG | OXYGEN SATURATION: 98 % | WEIGHT: 133 LBS

## 2024-09-03 DIAGNOSIS — K56.609 SBO (SMALL BOWEL OBSTRUCTION): Primary | ICD-10-CM

## 2024-09-03 LAB
ALBUMIN SERPL BCP-MCNC: 3.8 G/DL (ref 3.5–5.2)
ALP SERPL-CCNC: 94 U/L (ref 55–135)
ALT SERPL W/O P-5'-P-CCNC: 79 U/L (ref 10–44)
ANION GAP SERPL CALC-SCNC: 11 MMOL/L (ref 8–16)
AST SERPL-CCNC: 55 U/L (ref 10–40)
BACTERIA #/AREA URNS HPF: ABNORMAL /HPF
BASOPHILS # BLD AUTO: 0.08 K/UL (ref 0–0.2)
BASOPHILS NFR BLD: 0.9 % (ref 0–1.9)
BILIRUB SERPL-MCNC: 0.9 MG/DL (ref 0.1–1)
BILIRUB UR QL STRIP: NEGATIVE
BUN SERPL-MCNC: 14 MG/DL (ref 6–20)
CALCIUM SERPL-MCNC: 9.9 MG/DL (ref 8.7–10.5)
CHLORIDE SERPL-SCNC: 106 MMOL/L (ref 95–110)
CLARITY UR: CLEAR
CO2 SERPL-SCNC: 22 MMOL/L (ref 23–29)
COLOR UR: YELLOW
CREAT SERPL-MCNC: 1.1 MG/DL (ref 0.5–1.4)
DIFFERENTIAL METHOD BLD: ABNORMAL
EOSINOPHIL # BLD AUTO: 0.1 K/UL (ref 0–0.5)
EOSINOPHIL NFR BLD: 0.8 % (ref 0–8)
ERYTHROCYTE [DISTWIDTH] IN BLOOD BY AUTOMATED COUNT: 15.9 % (ref 11.5–14.5)
EST. GFR  (NO RACE VARIABLE): >60 ML/MIN/1.73 M^2
GLUCOSE SERPL-MCNC: 122 MG/DL (ref 70–110)
GLUCOSE UR QL STRIP: NEGATIVE
GRAN CASTS #/AREA URNS LPF: 2 /LPF
HCT VFR BLD AUTO: 32.6 % (ref 37–48.5)
HGB BLD-MCNC: 10.8 G/DL (ref 12–16)
HGB UR QL STRIP: NEGATIVE
HYALINE CASTS #/AREA URNS LPF: 2 /LPF
IMM GRANULOCYTES # BLD AUTO: 0.02 K/UL (ref 0–0.04)
IMM GRANULOCYTES NFR BLD AUTO: 0.2 % (ref 0–0.5)
KETONES UR QL STRIP: ABNORMAL
LACTATE SERPL-SCNC: 1.1 MMOL/L (ref 0.5–2.2)
LEUKOCYTE ESTERASE UR QL STRIP: NEGATIVE
LIPASE SERPL-CCNC: 45 U/L (ref 4–60)
LYMPHOCYTES # BLD AUTO: 0.6 K/UL (ref 1–4.8)
LYMPHOCYTES NFR BLD: 6.7 % (ref 18–48)
MCH RBC QN AUTO: 29 PG (ref 27–31)
MCHC RBC AUTO-ENTMCNC: 33.1 G/DL (ref 32–36)
MCV RBC AUTO: 87 FL (ref 82–98)
MICROSCOPIC COMMENT: ABNORMAL
MONOCYTES # BLD AUTO: 0.8 K/UL (ref 0.3–1)
MONOCYTES NFR BLD: 8.3 % (ref 4–15)
NEUTROPHILS # BLD AUTO: 7.7 K/UL (ref 1.8–7.7)
NEUTROPHILS NFR BLD: 83.1 % (ref 38–73)
NITRITE UR QL STRIP: NEGATIVE
NRBC BLD-RTO: 0 /100 WBC
PH UR STRIP: 6 [PH] (ref 5–8)
PLATELET # BLD AUTO: 358 K/UL (ref 150–450)
PMV BLD AUTO: 9.5 FL (ref 9.2–12.9)
POTASSIUM SERPL-SCNC: 4.3 MMOL/L (ref 3.5–5.1)
PROT SERPL-MCNC: 7.1 G/DL (ref 6–8.4)
PROT UR QL STRIP: ABNORMAL
RBC # BLD AUTO: 3.73 M/UL (ref 4–5.4)
RBC #/AREA URNS HPF: 2 /HPF (ref 0–4)
SODIUM SERPL-SCNC: 139 MMOL/L (ref 136–145)
SP GR UR STRIP: 1.01 (ref 1–1.03)
SQUAMOUS #/AREA URNS HPF: 3 /HPF
URN SPEC COLLECT METH UR: ABNORMAL
UROBILINOGEN UR STRIP-ACNC: NEGATIVE EU/DL
WBC # BLD AUTO: 9.25 K/UL (ref 3.9–12.7)
WBC #/AREA URNS HPF: 4 /HPF (ref 0–5)

## 2024-09-03 PROCEDURE — 80053 COMPREHEN METABOLIC PANEL: CPT | Performed by: NURSE PRACTITIONER

## 2024-09-03 PROCEDURE — 83690 ASSAY OF LIPASE: CPT | Performed by: NURSE PRACTITIONER

## 2024-09-03 PROCEDURE — 85025 COMPLETE CBC W/AUTO DIFF WBC: CPT | Performed by: NURSE PRACTITIONER

## 2024-09-03 PROCEDURE — 99285 EMERGENCY DEPT VISIT HI MDM: CPT | Mod: 25

## 2024-09-03 PROCEDURE — 74177 CT ABD & PELVIS W/CONTRAST: CPT | Mod: TC

## 2024-09-03 PROCEDURE — 96375 TX/PRO/DX INJ NEW DRUG ADDON: CPT

## 2024-09-03 PROCEDURE — 63600175 PHARM REV CODE 636 W HCPCS: Performed by: NURSE PRACTITIONER

## 2024-09-03 PROCEDURE — 96374 THER/PROPH/DIAG INJ IV PUSH: CPT

## 2024-09-03 PROCEDURE — 25000003 PHARM REV CODE 250: Performed by: NURSE PRACTITIONER

## 2024-09-03 PROCEDURE — 81000 URINALYSIS NONAUTO W/SCOPE: CPT | Performed by: NURSE PRACTITIONER

## 2024-09-03 PROCEDURE — 87040 BLOOD CULTURE FOR BACTERIA: CPT | Performed by: NURSE PRACTITIONER

## 2024-09-03 PROCEDURE — 96361 HYDRATE IV INFUSION ADD-ON: CPT

## 2024-09-03 PROCEDURE — 74177 CT ABD & PELVIS W/CONTRAST: CPT | Mod: 26,,, | Performed by: RADIOLOGY

## 2024-09-03 PROCEDURE — 83605 ASSAY OF LACTIC ACID: CPT | Performed by: NURSE PRACTITIONER

## 2024-09-03 PROCEDURE — 25500020 PHARM REV CODE 255: Performed by: STUDENT IN AN ORGANIZED HEALTH CARE EDUCATION/TRAINING PROGRAM

## 2024-09-03 RX ORDER — FENTANYL CITRATE 50 UG/ML
25 INJECTION, SOLUTION INTRAMUSCULAR; INTRAVENOUS
Status: COMPLETED | OUTPATIENT
Start: 2024-09-03 | End: 2024-09-03

## 2024-09-03 RX ORDER — ONDANSETRON HYDROCHLORIDE 2 MG/ML
4 INJECTION, SOLUTION INTRAVENOUS
Status: COMPLETED | OUTPATIENT
Start: 2024-09-03 | End: 2024-09-03

## 2024-09-03 RX ORDER — FENTANYL CITRATE 50 UG/ML
50 INJECTION, SOLUTION INTRAMUSCULAR; INTRAVENOUS
Status: COMPLETED | OUTPATIENT
Start: 2024-09-03 | End: 2024-09-03

## 2024-09-03 RX ORDER — SODIUM CHLORIDE 9 MG/ML
1000 INJECTION, SOLUTION INTRAVENOUS
Status: COMPLETED | OUTPATIENT
Start: 2024-09-03 | End: 2024-09-03

## 2024-09-03 RX ADMIN — ONDANSETRON 4 MG: 2 INJECTION INTRAMUSCULAR; INTRAVENOUS at 11:09

## 2024-09-03 RX ADMIN — FENTANYL CITRATE 25 MCG: 100 INJECTION, SOLUTION INTRAMUSCULAR; INTRAVENOUS at 04:09

## 2024-09-03 RX ADMIN — SODIUM CHLORIDE 1000 ML: 9 INJECTION, SOLUTION INTRAVENOUS at 04:09

## 2024-09-03 RX ADMIN — IOHEXOL 75 ML: 350 INJECTION, SOLUTION INTRAVENOUS at 12:09

## 2024-09-03 RX ADMIN — ONDANSETRON 4 MG: 2 INJECTION INTRAMUSCULAR; INTRAVENOUS at 01:09

## 2024-09-03 RX ADMIN — PROMETHAZINE HYDROCHLORIDE 12.5 MG: 25 INJECTION INTRAMUSCULAR; INTRAVENOUS at 04:09

## 2024-09-03 RX ADMIN — FENTANYL CITRATE 50 MCG: 100 INJECTION, SOLUTION INTRAMUSCULAR; INTRAVENOUS at 11:09

## 2024-09-03 RX ADMIN — FENTANYL CITRATE 25 MCG: 100 INJECTION, SOLUTION INTRAMUSCULAR; INTRAVENOUS at 01:09

## 2024-09-03 RX ADMIN — SODIUM CHLORIDE 1000 ML: 9 INJECTION, SOLUTION INTRAVENOUS at 11:09

## 2024-09-03 NOTE — ED PROVIDER NOTES
Encounter Date: 9/3/2024       History     Chief Complaint   Patient presents with    Abdominal Pain     Pt. C/o abd. With an ostomy reversal on Thursday.      POV to ED with spouse.  Patient complains of abdominal pain with vomiting.  States her abdomen pain started on 08/31/24 and her vomiting started yesterday. Unable to keep anything down other than a few sips of sprite at a time. History of colon cancer. Colostomy reversal on 08/29/2024, Dr Paris [facility]. Denies fever. Hx of Sphincter of Oddi dysfunction. States she can only take Fentanyl for pain, this medication works well for her. No other complaints.    The history is provided by the patient and the spouse.     Review of patient's allergies indicates:   Allergen Reactions    Codeine Other (See Comments)     Per patient, causes pancreatitis    Hydrocodone bitartrate     Hydrocodone-acetaminophen Other (See Comments)    Opioids - morphine analogues      Chest pain/Okay w/Fentanyl 50 mcg - received in ED x3 over 6 hours without issues.    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    FANCC gene c.553C>T ( p.R185*) [facility] Syndrome Analyses with CancerNext-Expanded (77 genes)    Pre-diabetes     Sphincter of Oddi dysfunction      Past Surgical History:   Procedure Laterality Date    CHOLECYSTECTOMY      COLONOSCOPY      CYSTOSCOPY W/ URETERAL STENT PLACEMENT Bilateral 5/9/2024    Procedure: CYSTOSCOPY, WITH URETERAL STENT INSERTION;  Surgeon: Cas Bang MD;  Location: [facility] OR [address] [place] [address];  Service: Urology;  Laterality: Bilateral;    EXPLORATORY LAPAROTOMY      FLEXIBLE SIGMOIDOSCOPY N/A 5/9/2024    Procedure: SIGMOIDOSCOPY, FLEXIBLE;  Surgeon: SANTIAGO Paris MD;  Location: [facility] OR [address] [place] [address];  Service: Colon and Rectal;  Laterality: N/A;    ILEOSTOMY CLOSURE N/A  8/29/2024    Procedure: CLOSURE, ILEOSTOMY;  Surgeon: SANTIAGO Paris MD;  Location: [facility] OR;  Service: Colon and Rectal;  Laterality: N/A;    LOW ANTERIOR RESECTION OF COLON N/A 5/9/2024    Procedure: RESECTION, COLON, LOW ANTERIOR, robotic converted to open;  Surgeon: SANTIAGO Paris MD;  Location: [facility] OR [facility] F[facility];  Service: Colon and Rectal;  Laterality: N/A;    LYSIS OF ADHESIONS  5/9/2024    Procedure: LYSIS, ADHESIONS;  Surgeon: SANTIAGO Paris MD;  Location: [facility] OR [facility] FLR;  Service: Colon and Rectal;;  extensive, greater  than 2 hours; 22 modifier    MOBILIZATION, SPLENIC FLEXURE, LAPAROSCOPIC  5/9/2024    Procedure: MOBILIZATION, SPLENIC FLEXURE, LAPAROSCOPIC;  Surgeon: SANTIAGO Paris MD;  Location: [facility] OR [facility] FLR;  Service: Colon and Rectal;;  22 modifier; greater than 2 hours    OVARY SURGERY      relocation    ROBOT-ASSISTED LAPAROSCOPIC RESECTION OF SIGMOID COLON USING DA ESTEFANI XI N/A 5/9/2024    Procedure: XI ROBOTIC COLECTOMY, SIGMOID;  Surgeon: SANTIAGO Paris MD;  [facility];  Service: Colon and Rectal;  Laterality: N/A;  converted to open    thryoid nodule       Family History   Problem Relation Name Age of Onset    Colon cancer Maternal Grandfather  67    Lymphoma Maternal Cousin      Lung cancer Other       Social History     Tobacco Use    Smoking status: Never    Smokeless tobacco: Never   Substance Use Topics    Alcohol use: Not Currently     Comment: once a month    Drug use: Never     Review of Systems   Constitutional:  Positive for appetite change. Negative for chills and fever.   Gastrointestinal:  Positive for abdominal pain, nausea and vomiting. Negative for diarrhea.   Genitourinary:  Negative for dysuria.   All other systems reviewed and are negative.      Physical Exam     Initial Vitals [09/03/24 1015]   BP Pulse Resp Temp SpO2   124/64 (!) 112 18 98.9 °F (37.2 °C) 98 %      MAP       --         Physical Exam    Nursing note and vitals  reviewed.  Constitutional: She appears well-developed and well-nourished. No distress.   HENT:   Head: Normocephalic and atraumatic.   Mouth/Throat: Oropharynx is clear and moist.   Eyes: Pupils are equal, round, and reactive to light.   Neck:   Normal range of motion.  Cardiovascular:  Regular rhythm.           Tachycardia rate 112   Pulmonary/Chest: Breath sounds normal. No respiratory distress.   Abdominal: Abdomen is soft.   Ostomy site RLQ. No distention, no redness. Mild tenderness, no drainage   Musculoskeletal:         General: Normal range of motion.      Cervical back: Normal range of motion.     Neurological: She is alert and oriented to person, place, and time. GCS score is 15. GCS eye subscore is 4. GCS verbal subscore is 5. GCS motor subscore is 6.   Skin: Skin is warm and dry. Capillary refill takes less than 2 seconds.   Psychiatric: She has a normal mood and affect. Thought content normal.         ED Course   Procedures  Labs Reviewed   CBC W/ AUTO DIFFERENTIAL - Abnormal       Result Value    WBC 9.25      RBC 3.73 (*)     Hemoglobin 10.8 (*)     Hematocrit 32.6 (*)     MCV 87      MCH 29.0      MCHC 33.1      RDW 15.9 (*)     Platelets 358      MPV 9.5      Immature Granulocytes 0.2      Gran # (ANC) 7.7      Immature Grans (Abs) 0.02      Lymph # 0.6 (*)     Mono # 0.8      Eos # 0.1      Baso # 0.08      nRBC 0      Gran % 83.1 (*)     Lymph % 6.7 (*)     Mono % 8.3      Eosinophil % 0.8      Basophil % 0.9      Differential Method Automated     COMPREHENSIVE METABOLIC PANEL - Abnormal    Sodium 139      Potassium 4.3      Chloride 106      CO2 22 (*)     Glucose 122 (*)     BUN 14      Creatinine 1.1      Calcium 9.9      Total Protein 7.1      Albumin 3.8      Total Bilirubin 0.9      Alkaline Phosphatase 94      AST 55 (*)     ALT 79 (*)     eGFR >60.0      Anion Gap 11     URINALYSIS, REFLEX TO URINE CULTURE - Abnormal    Specimen UA Urine, Unspecified      Color, UA Yellow      Appearance,  UA Clear      pH, UA 6.0      Specific Gravity, UA 1.010      Protein, UA 1+ (*)     Glucose, UA Negative      Ketones, UA 1+ (*)     Bilirubin (UA) Negative      Occult Blood UA Negative      Nitrite, UA Negative      Urobilinogen, UA Negative      Leukocytes, UA Negative      Narrative:     Preferred Collection Type->Urine, Clean Catch  Specimen Source->Urine   URINALYSIS MICROSCOPIC - Abnormal    RBC, UA 2      WBC, UA 4      Bacteria Few (*)     Squam Epithel, UA 3      Hyaline Casts, UA 2 (*)     Granular Casts, UA 2 (*)     Microscopic Comment SEE COMMENT      Narrative:     Preferred Collection Type->Urine, Clean Catch  Specimen Source->Urine   CULTURE, BLOOD   CULTURE, BLOOD   LIPASE    Lipase 45     LACTIC ACID, PLASMA    Lactate (Lactic Acid) 1.1            Imaging Results               CT Abdomen Pelvis With IV Contrast NO Oral Contrast (Final result)  Result time 09/03/24 12:40:47      Final result by Michele Rdz MD (09/03/24 12:40:47)                   Impression:      1. Multiple poorly defined hepatic lesions suspicious for metastatic disease.  2. Findings consistent with a partial or developing distal small bowel obstruction at the level of the small bowel anastomosis.  3. Postsurgical change from prior lower anterior resection.  4. Persistent pseudoaneurysm of the left common femoral artery.  This report was flagged in Epic as abnormal.      Electronically signed by: Michele Rdz  Date:    09/03/2024  Time:    12:40               Narrative:    EXAMINATION:  CT ABDOMEN PELVIS WITH IV CONTRAST    CLINICAL HISTORY:  Abdominal abscess/infection suspected;    TECHNIQUE:  Low dose axial images, sagittal and coronal reformations were obtained from the lung bases to the pubic symphysis following the IV administration of 75 mL of Omnipaque 350 .  Oral contrast was not given.    COMPARISON:  CT 08/14/2024.    FINDINGS:  The liver and spleen are normal in size and attenuation.  There again multiple  poorly defined enhancing lesions the hepatic parenchyma the largest measuring 2.3 cm again suspicious for metastatic disease.  Previous cholecystectomy.  The pancreas and adrenal glands are unremarkable.    Kidneys are normal in size and enhance homogeneously.  No renal calculi.  No changes of hydronephrosis.  No perinephric inflammatory change.    The proximal and mid loops of small bowel are fluid dilated measuring up to 4.4 cm.  Distal loops of small bowel are decompressed.  There is a transition zone within the right mid abdomen at the level of the small bowel anastomosis.  Scattered air and stool throughout the colon.  Postsurgical change from prior lower anterior resection.    Bladder is partially distended.  Prior hysterectomy.  Vaginal cuff unremarkable.  Small amount of free fluid within the dependent pelvis.    No significant mesenteric or retroperitoneal lymphadenopathy.  There is a persistent pseudoaneurysm of the left common femoral artery again measuring 2.2 cm.                                    X-Rays:   Independently Interpreted Readings:   Other Readings:  EXAMINATION:  CT ABDOMEN PELVIS WITH IV CONTRAST     CLINICAL HISTORY:  Abdominal abscess/infection suspected;     TECHNIQUE:  Low dose axial images, sagittal and coronal reformations were obtained from the lung bases to the pubic symphysis following the IV administration of 75 mL of Omnipaque 350 .  Oral contrast was not given.     COMPARISON:  CT 08/14/2024.     FINDINGS:  The liver and spleen are normal in size and attenuation.  There again multiple poorly defined enhancing lesions the hepatic parenchyma the largest measuring 2.3 cm again suspicious for metastatic disease.  Previous cholecystectomy.  The pancreas and adrenal glands are unremarkable.     Kidneys are normal in size and enhance homogeneously.  No renal calculi.  No changes of hydronephrosis.  No perinephric inflammatory change.     The proximal and mid loops of small bowel are  fluid dilated measuring up to 4.4 cm.  Distal loops of small bowel are decompressed.  There is a transition zone within the right mid abdomen at the level of the small bowel anastomosis.  Scattered air and stool throughout the colon.  Postsurgical change from prior lower anterior resection.     Bladder is partially distended.  Prior hysterectomy.  Vaginal cuff unremarkable.  Small amount of free fluid within the dependent pelvis.     No significant mesenteric or retroperitoneal lymphadenopathy.  There is a persistent pseudoaneurysm of the left common femoral artery again measuring 2.2 cm.     Impression:     1. Multiple poorly defined hepatic lesions suspicious for metastatic disease.  2. Findings consistent with a partial or developing distal small bowel obstruction at the level of the small bowel anastomosis.  3. Postsurgical change from prior lower anterior resection.  4. Persistent pseudoaneurysm of the left common femoral artery.      Medications   sodium chloride 0.9% bolus 1,000 mL 1,000 mL (0 mLs Intravenous Stopped 9/3/24 1226)   fentaNYL 50 mcg/mL injection 50 mcg (50 mcg Intravenous Given 9/3/24 1125)   ondansetron injection 4 mg (4 mg Intravenous Given 9/3/24 1125)   iohexoL (OMNIPAQUE 350) injection 75 mL (75 mLs Intravenous Given 9/3/24 1222)   fentaNYL 50 mcg/mL injection 25 mcg (25 mcg Intravenous Given 9/3/24 1345)   ondansetron injection 4 mg (4 mg Intravenous Given 9/3/24 1345)   0.9%  NaCl infusion (1,000 mLs Intravenous New Bag 9/3/24 1618)   fentaNYL 50 mcg/mL injection 25 mcg (25 mcg Intravenous Given 9/3/24 1617)   promethazine (PHENERGAN) 12.5 mg in 0.9% NaCl 50 mL IVPB (12.5 mg Intravenous New Bag 9/3/24 1617)     Medical Decision Making  Presents for evaluation of abdominal pain.  Lab work ordered, CT ordered.  See HPI  Differentials include but not limited to dehydration, volume depletion, abnormal electrolytes, anemia, UTI, SBO, diverticulitis, diverticulosis  @ 1157, awaiting lab and CT  results  Diagnosis partial distal SBO. Discussed with Dr Cruz, General Surgery, requests transfer back to Dr Paris [facility]. Patient and spouse are advised of CT report. Patient declines NG at present time, states the last time it was difficult, took about 4 attempts to place. Prefers to wait. Spouse defers to patient. Discussed with Dr Araujo, sees patient in ED. Transfer request placed. Pain control, nausea control in ED. patient agrees with transfer/admission.  @ 1406, per nursing staff, patient has been accpeted by Dr Hadley [facility], ED to ED transfer, no additional orders received  @ 1641, patient was now agreeable for NG tube placement.  Ordered for RN to place, pending EMS transport      Amount and/or Complexity of Data Reviewed  Independent Historian: spouse     Details:   Labs: ordered. Decision-making details documented in ED Course.  Radiology: ordered. Decision-making details documented in ED Course.    Risk  Prescription drug management.  Decision regarding hospitalization.               ED Course as of 09/03/24 1642   Tue Sep 03, 2024   1157    Complete Blood Count (CBC)    Final resultCollected 09/03 1113    RBC 3.73  Hemoglobin 10.8  Hematocrit 32.6  RDW 15.9  Lymph # 0.6  Gran % 83.1  Lymph % 6.7   Comprehensive Metabolic Panel (CMP)    Final resultCollected 09/03 1113    CO2 22  Glucose 122  AST 55  ALT 79          [CP]   1455    Urinalysis Microscopic    Final resultCollected 09/03 1351    Bacteria, UA Few  Hyaline Casts, UA 2  Granular Casts, UA 2   Urinalysis, Reflex to Urine Culture Urine, Clean Catch    Final resultCollected 09/03 1351    Protein, UA 1+  Ketones, UA 1+   Comprehensive Metabolic Panel (CMP)    Final resultCollected 09/03 1113    CO2 22  Glucose 122  AST 55  ALT 79   Complete Blood Count (CBC)    Final resultCollected 09/03 1113    RBC 3.73  Hemoglobin 10.8  Hematocrit 32.6  RDW 15.9  Lymph # 0.6  Gran % 83.1  Lymph % 6.7          [CP]      ED Course User  Index  [CP] Karley Alfredo NP                           Clinical Impression:  Final diagnoses:  [K56.609] SBO (small bowel obstruction) (Primary)         ED Disposition Condition    Transfer to Another Facility Stable                Karley Alfredo, BILLIE  09/03/24 1053       Karley Alfredo, BILLIE  09/03/24 1157       Karley Alfredo, BILLIE  09/03/24 1249       Karley Alfredo, BILLIE  09/03/24 1308       Karley Alfredo, BILLIE  09/03/24 1354       Karley Alfredo, BILLIE  09/03/24 1604       Karley Alfredo, BILLIE  09/03/24 1642

## 2024-09-08 LAB
BACTERIA BLD CULT: NORMAL
BACTERIA BLD CULT: NORMAL

## 2024-09-09 ENCOUNTER — PATIENT MESSAGE (OUTPATIENT)
Dept: SURGERY | Facility: CLINIC | Age: 49
End: 2024-09-09
Payer: COMMERCIAL

## 2024-09-09 DIAGNOSIS — R60.1 GENERALIZED EDEMA: Primary | ICD-10-CM

## 2024-09-10 RX ORDER — FUROSEMIDE 20 MG/1
20 TABLET ORAL 2 TIMES DAILY
Qty: 4 TABLET | Refills: 0 | Status: SHIPPED | OUTPATIENT
Start: 2024-09-10 | End: 2024-09-12

## 2024-09-16 ENCOUNTER — HOSPITAL ENCOUNTER (OUTPATIENT)
Dept: RADIOLOGY | Facility: HOSPITAL | Age: 49
Discharge: HOME | End: 2024-09-16
Payer: COMMERCIAL

## 2024-09-16 DIAGNOSIS — R60.1 GENERALIZED EDEMA: ICD-10-CM

## 2024-09-16 PROCEDURE — 71046 X-RAY EXAM CHEST 2 VIEWS: CPT | Mod: TC

## 2024-09-16 PROCEDURE — 71046 X-RAY EXAM CHEST 2 VIEWS: CPT | Mod: 26,,, | Performed by: RADIOLOGY

## 2024-09-17 ENCOUNTER — TELEPHONE (OUTPATIENT)
Dept: GYNECOLOGIC ONCOLOGY | Facility: CLINIC | Age: 49
End: 2024-09-17
Payer: COMMERCIAL

## 2024-09-17 DIAGNOSIS — R60.1 GENERALIZED EDEMA: ICD-10-CM

## 2024-09-17 RX ORDER — FUROSEMIDE 20 MG/1
20 TABLET ORAL 2 TIMES DAILY
Qty: 4 TABLET | Refills: 0 | Status: SHIPPED | OUTPATIENT
Start: 2024-09-17 | End: 2024-09-19

## 2024-09-17 NOTE — TELEPHONE ENCOUNTER
Called patient to review labs. She reports edema is improving. Will have patient follow-up with PCP for elevated BNP.

## 2024-09-18 ENCOUNTER — PATIENT MESSAGE (OUTPATIENT)
Dept: SURGERY | Facility: CLINIC | Age: 49
End: 2024-09-18
Payer: COMMERCIAL

## 2024-09-25 ENCOUNTER — DOCUMENT SCAN (OUTPATIENT)
Dept: HOME HEALTH SERVICES | Facility: HOSPITAL | Age: 49
End: 2024-09-25
Payer: COMMERCIAL

## 2024-09-25 ENCOUNTER — OFFICE VISIT (OUTPATIENT)
Dept: SURGERY | Facility: CLINIC | Age: 49
End: 2024-09-25
Payer: COMMERCIAL

## 2024-09-25 VITALS
HEART RATE: 80 BPM | HEIGHT: 70 IN | DIASTOLIC BLOOD PRESSURE: 62 MMHG | OXYGEN SATURATION: 96 % | WEIGHT: 138.69 LBS | SYSTOLIC BLOOD PRESSURE: 115 MMHG | TEMPERATURE: 97 F | BODY MASS INDEX: 19.86 KG/M2 | RESPIRATION RATE: 18 BRPM

## 2024-09-25 DIAGNOSIS — R15.0 INCOMPLETE DEFECATION: ICD-10-CM

## 2024-09-25 DIAGNOSIS — R15.1 FECAL SMEARING: ICD-10-CM

## 2024-09-25 DIAGNOSIS — L29.0 PRURITUS ANI: Primary | ICD-10-CM

## 2024-09-25 DIAGNOSIS — K62.89 RECTAL OR ANAL PAIN: ICD-10-CM

## 2024-09-25 PROCEDURE — 99024 POSTOP FOLLOW-UP VISIT: CPT | Mod: S$GLB,,, | Performed by: COLON & RECTAL SURGERY

## 2024-09-25 PROCEDURE — 99999 PR PBB SHADOW E&M-EST. PATIENT-LVL V: CPT | Mod: PBBFAC,,, | Performed by: COLON & RECTAL SURGERY

## 2024-09-25 NOTE — PROGRESS NOTES
HPI:  Collette Castro is a 49 y.o. female with history of stage IV sigmoid colon CA    8-  DLI closure    Complicated by ileus requiring re-hospitalization managed non-operatively    9-  Interval hx  Edema, systemic, improved with diuresis.  This is better    Irregular BMs.  Not feeling empty.  Going to BM multiple times.    Occasional fecal seepage.   Loose stools yesterday - took lomotil  Swelling, discomfort, blood on TP    No abd pain.  No nausea or vomiting.        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    FANCC gene c.553C>T ( p.R185*) - [facility] Godwin Syndrome Analyses with CancerNext-Expanded (77 genes)    Pre-diabetes     Sphincter of Oddi dysfunction         Past Surgical History:   Procedure Laterality Date    CHOLECYSTECTOMY      COLONOSCOPY      CYSTOSCOPY W/ URETERAL STENT PLACEMENT Bilateral 5/9/2024    Procedure: CYSTOSCOPY, WITH URETERAL STENT INSERTION;  Surgeon: Cas Bang MD;  Location: [facility] OR [address] [place] [address];  Service: Urology;  Laterality: Bilateral;    EXPLORATORY LAPAROTOMY      FLEXIBLE SIGMOIDOSCOPY N/A 5/9/2024    Procedure: SIGMOIDOSCOPY, FLEXIBLE;  Surgeon: SANTIAGO Paris MD;  Location: [facility] OR [address] [place] [address];  Service: Colon and Rectal;  Laterality: N/A;    ILEOSTOMY CLOSURE N/A 8/29/2024    Procedure: CLOSURE, ILEOSTOMY;  Surgeon: SANTIAGO Paris MD;  Location: [facility];  Service: Colon and Rectal;  Laterality: N/A;    LOW ANTERIOR RESECTION OF COLON N/A 5/9/2024    Procedure: RESECTION, COLON, LOW ANTERIOR, robotic converted to open;  Surgeon: SANTIAGO Paris MD;  [facility];  Service: Colon and Rectal;  Laterality: N/A;    LYSIS OF ADHESIONS  5/9/2024    Procedure: LYSIS, ADHESIONS;  Surgeon: SANTIAGO Paris MD;  [facility];  Service: Colon and Rectal;;  extensive, greater  than 2 hours; 22 modifier    MOBILIZATION, SPLENIC FLEXURE, LAPAROSCOPIC  5/9/2024     Procedure: MOBILIZATION, SPLENIC FLEXURE, LAPAROSCOPIC;  Surgeon: SANTIAGO Paris MD;  Location: [facility] OR 2ND FLR;  Service: Colon and Rectal;;  22 modifier; greater than 2 hours    OVARY SURGERY      relocation    ROBOT-ASSISTED LAPAROSCOPIC RESECTION OF SIGMOID COLON USING DA ESTEFANI XI N/A 5/9/2024    Procedure: XI ROBOTIC COLECTOMY, SIGMOID;  Surgeon: SANTIAGO Paris MD;  Location: [facility] OR 2ND FLR;  Service: Colon and Rectal;  Laterality: N/A;  converted to open    thryoid nodule         Review of patient's allergies indicates:   Allergen Reactions    Codeine Other (See Comments)     Per patient, causes pancreatitis    Hydrocodone bitartrate     Hydrocodone-acetaminophen Other (See Comments)    Opioids - morphine analogues      Chest pain/Okay w/Fentanyl 50 mcg - received in ED x3 over 6 hours without issues.    Oxycodone      Pancreatitis w/ all oral narctotics    Tramadol hcl      pancreatitis       Family History   Problem Relation Name Age of Onset    Colon cancer Maternal Grandfather  67    Lymphoma Maternal Cousin      Lung cancer Other         Social History     Socioeconomic History    Marital status:    Tobacco Use    Smoking status: Never    Smokeless tobacco: Never   Substance and Sexual Activity    Alcohol use: Not Currently     Comment: once a month    Drug use: Never    Sexual activity: Yes     Partners: Male     Birth control/protection: See Surgical Hx     Social Determinants of Health     Financial Resource Strain: Low Risk  (5/16/2024)    Overall Financial Resource Strain (CARDIA)     Difficulty of Paying Living Expenses: Not hard at all   Food Insecurity: No Food Insecurity (9/4/2024)    Hunger Vital Sign     Worried About Running Out of Food in the Last Year: Never true     Ran Out of Food in the Last Year: Never true   Transportation Needs: No Transportation Needs (9/4/2024)    TRANSPORTATION NEEDS     Transportation : No   Physical Activity: Insufficiently Active (5/16/2024)     "Exercise Vital Sign     Days of Exercise per Week: 3 days     Minutes of Exercise per Session: 30 min   Stress: No Stress Concern Present (5/16/2024)    [employer] - Occupational Stress Questionnaire     Feeling of Stress : Not at all   Housing Stability: Low Risk  (9/4/2024)    Housing Stability Vital Sign     Unable to Pay for Housing in the Last Year: No     Homeless in the Last Year: No       ROS:  GENERAL: No fever, chills, fatigability or weight loss.  Integument: No rashes, redness, icterus  CHEST: Denies VILLARREAL, cyanosis, wheezing, cough and sputum production.  CARDIOVASCULAR: Denies chest pain, PND, orthopnea or reduced exercise tolerance.  GI: Denies abd pain, dysphagia, nausea, vomiting, no hematemesis   : Denies burning on urination, no hematuria, no bacteriuria  MSK: No deformities, swelling, joint pain swelling  Neurologic: No HAs, seizures, weakness, paresthesias, gait problems    PE:  General appearance non toxic  /62 (BP Location: Left arm, Patient Position: Sitting, BP Method: Medium (Automatic))   Pulse 80   Temp 96.9 °F (36.1 °C) (Temporal)   Resp 18   Ht 5' 10" (1.778 m)   Wt 62.9 kg (138 lb 10.7 oz)   SpO2 96%   BMI 19.90 kg/m²     Sclera/ Skin anicteric  LN none palpable  AT NC EOMI  Neck supple trachea midline   Chest symmetric, nl excursion, no retractions, breathing comfortably  Abdomen    Wound healed  ND soft NT.  no masses, no organomegaly  EXT - no CCE  Neuro:  Mood/ affect nl, alert and oriented x 3, moves all ext's, gait nl    Rectal  Inspection excoriation of skin      Assessment:  Pruritus ani, excoriation, secondary to incomplete BMs, loose stools and leakage    Plan:  Discussed with pt and   Begin high fiber diet, metamucil.  Clean with warm water, keep area dry, use corn starch powder.    OV 4 weeks      "

## 2024-09-26 ENCOUNTER — DOCUMENTATION ONLY (OUTPATIENT)
Dept: SURGERY | Facility: CLINIC | Age: 49
End: 2024-09-26
Payer: COMMERCIAL

## 2024-10-17 ENCOUNTER — PATIENT MESSAGE (OUTPATIENT)
Dept: SURGERY | Facility: CLINIC | Age: 49
End: 2024-10-17
Payer: COMMERCIAL

## 2024-10-17 ENCOUNTER — TELEPHONE (OUTPATIENT)
Dept: HEMATOLOGY/ONCOLOGY | Facility: CLINIC | Age: 49
End: 2024-10-17
Payer: COMMERCIAL

## 2024-10-17 DIAGNOSIS — C18.9 METASTATIC COLON CANCER TO LIVER: Primary | ICD-10-CM

## 2024-10-17 DIAGNOSIS — C78.7 METASTATIC COLON CANCER TO LIVER: Primary | ICD-10-CM

## 2024-10-25 ENCOUNTER — TELEPHONE (OUTPATIENT)
Dept: HEMATOLOGY/ONCOLOGY | Facility: CLINIC | Age: 49
End: 2024-10-25
Payer: COMMERCIAL

## 2024-11-04 ENCOUNTER — HOSPITAL ENCOUNTER (OUTPATIENT)
Dept: RADIOLOGY | Facility: HOSPITAL | Age: 49
Discharge: HOME | End: 2024-11-04
Attending: SURGERY
Payer: COMMERCIAL

## 2024-11-04 DIAGNOSIS — C78.7 METASTATIC COLON CANCER TO LIVER: ICD-10-CM

## 2024-11-04 DIAGNOSIS — C18.9 METASTATIC COLON CANCER TO LIVER: ICD-10-CM

## 2024-11-04 LAB
CREAT SERPL-MCNC: 1.1 MG/DL (ref 0.5–1.4)
SAMPLE: NORMAL

## 2024-11-04 PROCEDURE — A9585 GADOBUTROL INJECTION: HCPCS

## 2024-11-04 PROCEDURE — 74183 MRI ABD W/O CNTR FLWD CNTR: CPT | Mod: 26,,, | Performed by: RADIOLOGY

## 2024-11-04 PROCEDURE — 74183 MRI ABD W/O CNTR FLWD CNTR: CPT | Mod: TC

## 2024-11-04 PROCEDURE — 25500020 PHARM REV CODE 255

## 2024-11-04 RX ORDER — GADOBUTROL 604.72 MG/ML
INJECTION INTRAVENOUS
Status: COMPLETED
Start: 2024-11-04 | End: 2024-11-04

## 2024-11-04 RX ADMIN — GADOBUTROL 6 ML: 604.72 INJECTION INTRAVENOUS at 07:11

## 2024-11-05 ENCOUNTER — PATIENT MESSAGE (OUTPATIENT)
Dept: SURGERY | Facility: CLINIC | Age: 49
End: 2024-11-05
Payer: COMMERCIAL

## 2024-11-05 NOTE — TELEPHONE ENCOUNTER
Call placed to  pt regarding portal message. Pt reports she has been doing good. Informed pt will alert Dr. Yuan to her request for a phone call to review imaging and imaging will be reviewed with her at her clinic appt on 11/13. Pt verbalized understanding.

## 2024-11-07 ENCOUNTER — DOCUMENTATION ONLY (OUTPATIENT)
Dept: SURGERY | Facility: CLINIC | Age: 49
End: 2024-11-07
Payer: COMMERCIAL

## 2024-11-13 ENCOUNTER — OFFICE VISIT (OUTPATIENT)
Dept: HEMATOLOGY/ONCOLOGY | Facility: CLINIC | Age: 49
End: 2024-11-13
Payer: COMMERCIAL

## 2024-11-13 ENCOUNTER — OFFICE VISIT (OUTPATIENT)
Dept: SURGERY | Facility: CLINIC | Age: 49
End: 2024-11-13
Payer: COMMERCIAL

## 2024-11-13 ENCOUNTER — HOSPITAL ENCOUNTER (OUTPATIENT)
Dept: RADIOLOGY | Facility: HOSPITAL | Age: 49
Discharge: HOME | End: 2024-11-13
Attending: SURGERY
Payer: COMMERCIAL

## 2024-11-13 VITALS
WEIGHT: 138.25 LBS | TEMPERATURE: 98 F | DIASTOLIC BLOOD PRESSURE: 69 MMHG | RESPIRATION RATE: 16 BRPM | OXYGEN SATURATION: 98 % | BODY MASS INDEX: 19.79 KG/M2 | HEART RATE: 90 BPM | SYSTOLIC BLOOD PRESSURE: 105 MMHG | HEIGHT: 70 IN

## 2024-11-13 VITALS
DIASTOLIC BLOOD PRESSURE: 73 MMHG | OXYGEN SATURATION: 98 % | HEART RATE: 84 BPM | WEIGHT: 138 LBS | BODY MASS INDEX: 19.8 KG/M2 | SYSTOLIC BLOOD PRESSURE: 126 MMHG

## 2024-11-13 DIAGNOSIS — C18.9 METASTATIC COLON CANCER TO LIVER: Primary | ICD-10-CM

## 2024-11-13 DIAGNOSIS — C18.7 MALIGNANT NEOPLASM OF SIGMOID COLON: Primary | ICD-10-CM

## 2024-11-13 DIAGNOSIS — Z85.41 HISTORY OF CERVICAL CANCER: ICD-10-CM

## 2024-11-13 DIAGNOSIS — C18.9 METASTATIC COLON CANCER TO LIVER: ICD-10-CM

## 2024-11-13 DIAGNOSIS — C78.7 METASTATIC COLON CANCER TO LIVER: ICD-10-CM

## 2024-11-13 DIAGNOSIS — C78.7 METASTATIC COLON CANCER TO LIVER: Primary | ICD-10-CM

## 2024-11-13 DIAGNOSIS — L27.0 DRUG RASH: ICD-10-CM

## 2024-11-13 PROCEDURE — 99999 PR PBB SHADOW E&M-EST. PATIENT-LVL III: CPT | Mod: PBBFAC,,, | Performed by: SURGERY

## 2024-11-13 PROCEDURE — 99999 PR PBB SHADOW E&M-EST. PATIENT-LVL IV: CPT | Mod: PBBFAC,,, | Performed by: INTERNAL MEDICINE

## 2024-11-13 PROCEDURE — 99214 OFFICE O/P EST MOD 30 MIN: CPT | Mod: S$GLB,,, | Performed by: SURGERY

## 2024-11-13 PROCEDURE — 71250 CT THORAX DX C-: CPT | Mod: TC

## 2024-11-13 RX ORDER — DIPHENOXYLATE HYDROCHLORIDE AND ATROPINE SULFATE 2.5; .025 MG/1; MG/1
2 TABLET ORAL 4 TIMES DAILY
COMMUNITY
Start: 2024-10-15

## 2024-11-13 RX ORDER — CLOBETASOL PROPIONATE 0.05 G/100ML
SHAMPOO TOPICAL
COMMUNITY
Start: 2024-10-15

## 2024-11-13 RX ORDER — DOXYCYCLINE 100 MG/1
100 CAPSULE ORAL DAILY
COMMUNITY
Start: 2024-09-26 | End: 2024-12-25

## 2024-11-13 RX ORDER — METOPROLOL SUCCINATE 25 MG/1
25 TABLET, EXTENDED RELEASE ORAL
COMMUNITY
Start: 2024-10-08

## 2024-11-13 RX ORDER — TRIAMCINOLONE ACETONIDE 1 MG/G
CREAM TOPICAL 2 TIMES DAILY
COMMUNITY
Start: 2024-11-12 | End: 2024-11-26

## 2024-11-13 RX ORDER — SODIUM SULFACETAMIDE 100 MG/ML
LIQUID TOPICAL
COMMUNITY
Start: 2024-10-09

## 2024-11-13 RX ORDER — MINOCYCLINE HYDROCHLORIDE 100 MG/1
100 CAPSULE ORAL DAILY
COMMUNITY
Start: 2024-11-05

## 2024-11-13 NOTE — PROGRESS NOTES
MEDICAL ONCOLOGY - NEW PATIENT VISIT    Reason for visit: Sigmoid colon cancer    Best Contact Phone Number(s): There are no phone numbers on file.     Cancer/Stage/TNM:    Cancer Staging   No matching staging information was found for the patient.       Oncology History    No history exists.        HPI:   49 y.o. female with history of cervical cancer in her early 20s s/p XRT who presents for management discussion of her metastatic sigmoid colon adenocarcinoma.  She was diagnosed in December 2023 after having a mass that was biopsied and non-diagnostic multiple times prior. Staging imaging showed multiple liver lesions consistent with metastatic disease.  She saw Dr. Yuan who recommended systemic chemotherapy and consideration of resection and adjuvant DARRYL therapy.  She started FOLFOX under the care of Dr. Mathias at [facility].  She explains that she had quite poor tolerance to this with issues related to highly elevated AST/ALT (into the 300s) and concern for cardiotoxicity (EKG changes, d-dimer elevation), so she only received 3 cycles. Restaging imaging showed resolution of several liver lesions so she underwent MWA to her remaining liver lesion and primary tumor resection in May 2024 with diverting loop ileostomy by Dr. Paris.  She was started on pembrolizumab as a single agent after her surgery and she developed multiple new liver lesions on this therapy.  Because of issues with high volume output from her ileostomy she underwent closure with Dr. Paris at the end of August 2024.  She had progression on her imaging in August while on pembrolizumab, so on 9/30/24 she was started on oxaliplatin + panitumumab every 2 weeks.  She has received 4 doses thus far.  She reports good tolerance to her most recent two cycles.  Had issues with N/V and rash that have improved.  Denies pain, energy improving and starting to gain weight and work out more.    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  Her  in a nurse.    Patient presents with her .  ECOG PS is 0.  History has been obtained by chart review and discussion with the patient.    ROS:   Review of Systems   Constitutional:  Negative for weight loss.   Respiratory:  Negative for cough and shortness of breath.    Cardiovascular:  Negative for chest pain.   Gastrointestinal:  Positive for diarrhea. Negative for abdominal pain and nausea.   Genitourinary:  Negative for frequency.   Musculoskeletal:  Negative for back pain.   Skin:  Positive for rash.   Neurological:  Negative for headaches.   Psychiatric/Behavioral:  The patient is not nervous/anxious.        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 Analyses with CancerNext-Expanded (77 genes)    Pre-diabetes     Sphincter of Oddi dysfunction         Past Surgical History:   Past Surgical History:   Procedure Laterality Date    CHOLECYSTECTOMY      COLONOSCOPY      CYSTOSCOPY W/ URETERAL STENT PLACEMENT Bilateral 5/9/2024    Procedure: CYSTOSCOPY, WITH URETERAL STENT INSERTION;  Surgeon: Cas Bang MD;  Location: [facility] OR [address] [place] [address];  Service: Urology;  Laterality: Bilateral;    EXPLORATORY LAPAROTOMY      FLEXIBLE SIGMOIDOSCOPY N/A 5/9/2024    Procedure: SIGMOIDOSCOPY, FLEXIBLE;  Surgeon: SANTIAGO Paris MD;  Location: [facility] OR [address] [place] [address];  Service: Colon and Rectal;  Laterality: N/A;    ILEOSTOMY CLOSURE N/A 8/29/2024    Procedure: CLOSURE, ILEOSTOMY;  Surgeon: SANTIAGO Paris MD;  [facility];  Service: Colon and Rectal;  Laterality: N/A;    LOW ANTERIOR RESECTION OF COLON N/A 5/9/2024    Procedure: RESECTION, COLON, LOW ANTERIOR, robotic converted to open;  Surgeon: SANTIAGO Paris MD;  Location: [facility] OR [address] [place] [address];  Service: Colon and Rectal;  Laterality: N/A;    LYSIS OF ADHESIONS  5/9/2024    Procedure:  LYSIS, ADHESIONS;  Surgeon: SANTIAGO Paris MD;  Location: [facility] OR 2ND FLR;  Service: Colon and Rectal;;  extensive, greater  than 2 hours; 22 modifier    MOBILIZATION, SPLENIC FLEXURE, LAPAROSCOPIC  5/9/2024    Procedure: MOBILIZATION, SPLENIC FLEXURE, LAPAROSCOPIC;  Surgeon: SANTIAGO Paris MD;  Location: [facility] OR 2ND FLR;  Service: Colon and Rectal;;  22 modifier; greater than 2 hours    OVARY SURGERY      relocation    ROBOT-ASSISTED LAPAROSCOPIC RESECTION OF SIGMOID COLON USING DA ESTEFANI XI N/A 5/9/2024    Procedure: XI ROBOTIC COLECTOMY, SIGMOID;  Surgeon: SANTIAGO Paris MD;  Location: [facility] OR 2ND FLR;  Service: Colon and Rectal;  Laterality: N/A;  converted to open    thryoid nodule          Family History:   Family History   Problem Relation Name Age of Onset    Colon cancer Maternal Grandfather  67    Lymphoma Maternal Cousin      Lung cancer Other          Social History:   Social History     Tobacco Use    Smoking status: Never    Smokeless tobacco: Never   Substance Use Topics    Alcohol use: Not Currently     Comment: once a month        I have reviewed and updated the patient's past medical, surgical, family and social histories.    Allergies:   Review of patient's allergies indicates:   Allergen Reactions    Codeine Other (See Comments)     Per patient, causes pancreatitis    Hydrocodone bitartrate     Hydrocodone-acetaminophen Other (See Comments)    Opioids - morphine analogues      Chest pain/Okay w/Fentanyl 50 mcg - received in ED x3 over 6 hours without issues.    Oxycodone      Pancreatitis w/ all oral narctotics    Tramadol hcl      pancreatitis        Medications:   Current Outpatient Medications   Medication Sig Dispense Refill    acetaminophen (TYLENOL) 650 MG TbSR Take 1 tablet (650 mg total) by mouth every 8 (eight) hours. 60 tablet 0    ALPRAZolam (XANAX) 0.5 MG tablet Take 0.5 mg by mouth 2 (two) times daily as needed for Anxiety.      ibuprofen (ADVIL,MOTRIN) 600 MG tablet Take 1  tablet (600 mg total) by mouth 3 (three) times daily. 60 tablet 0    loperamide (IMODIUM) 2 mg capsule Take 1 capsule (2 mg total) by mouth 4 (four) times daily as needed for Diarrhea (for large volume thin ileostomy output or <1.5L of ileostomy output). 160 capsule 2    metFORMIN (GLUCOPHAGE-XR) 500 MG ER 24hr tablet Take 500 mg by mouth 2 (two) times daily with meals.      nitrofurantoin, macrocrystal-monohydrate, (MACROBID) 100 MG capsule Take 100 mg by mouth 2 (two) times daily.      ondansetron (ZOFRAN) 4 MG tablet Take 1 tablet (4 mg total) by mouth every 6 (six) hours as needed for Nausea. 15 tablet 0    propranoloL (INDERAL) 10 MG tablet Take 10 mg by mouth 3 (three) times daily as needed (headache and pain). Therapy not started      TOPROL XL 25 mg 24 hr tablet Take 25 mg by mouth. 50 mg      tretinoin (RETIN-A) 0.025 % cream Apply topically nightly.      VAGIFEM 10 mcg Tab Place vaginally as needed (twice monthly (no set dates)).      chlorhexidine (PERIDEX) 0.12 % solution Use as directed 15 mLs in the mouth or throat 2 (two) times daily.      clobetasoL (CLOBEX) 0.05 % shampoo Apply topically.      diphenoxylate-atropine 2.5-0.025 mg (LOMOTIL) 2.5-0.025 mg per tablet Take 2 tablets by mouth 4 (four) times daily.      doxycycline (VIBRAMYCIN) 100 MG Cap Take 100 mg by mouth once daily.      furosemide (LASIX) 20 MG tablet Take 1 tablet (20 mg total) by mouth 2 (two) times daily. for 2 days 4 tablet 0    minocycline (MINOCIN,DYNACIN) 100 MG capsule Take 100 mg by mouth once daily.      mupirocin (BACTROBAN) 2 % ointment 1 g by Nasal route 2 (two) times daily.      sulfacetamide sodium 10 % Clsr SMARTSIG:Topical Every Evening      triamcinolone acetonide 0.1% (KENALOG) 0.1 % cream Apply topically 2 (two) times daily.       No current facility-administered medications for this visit.     Facility-Administered Medications Ordered in Other Visits   Medication Dose Route Frequency Provider Last Rate Last Admin  "   gabapentin capsule 300 mg  300 mg Oral TID Natalya Rivera NP   300 mg at 05/12/24 6542        Physical Exam:   /69 (BP Location: Right arm, Patient Position: Sitting)   Pulse 90   Temp 98.2 °F (36.8 °C) (Oral)   Resp 16   Ht 5' 10" (1.778 m)   Wt 62.7 kg (138 lb 3.7 oz)   SpO2 98%   BMI 19.83 kg/m²                Physical Exam  Constitutional:       Appearance: Normal appearance.   Eyes:      General: No scleral icterus.     Extraocular Movements: Extraocular movements intact.   Cardiovascular:      Rate and Rhythm: Normal rate.   Pulmonary:      Effort: Pulmonary effort is normal. No respiratory distress.   Musculoskeletal:         General: Normal range of motion.      Right lower leg: No edema.      Left lower leg: No edema.   Skin:     General: Skin is warm.      Findings: Rash (acneiform rash on face) present.   Neurological:      Mental Status: She is oriented to person, place, and time.   Psychiatric:         Mood and Affect: Mood normal.         Behavior: Behavior normal.           Labs:   No results found for this or any previous visit (from the past 48 hours).     9/16/24 labs reviewed.  Albumin 3.1.  8/7/24 CEA 22.5.    Imaging:       MRI Abd - 11/4/24:  Impression:     Multifocal hepatic lesions compatible with metastatic disease.  No significant interval change.     Unchanged subcentimeter pancreatic cystic lesions measuring up to 7 mm.  Attention on 2 year follow-up is recommended.    Path:       Component 6 mo ago   Final Pathologic Diagnosis 1. Sigmoid colon and portion of rectum (sigmoid colectomy, low anterior resection):  Invasive adenocarcinoma, status posttreatment.  See cancer synoptic report below    2. Distal rectal margin (excision):    No dysplasia, no malignancy.    3. Portion of mesorectum (excision):  Negative for tumor  Fibroconnective and adipose tissue    4. Portion of rectum (excision):    No dysplasia.  No carcinoma.  Benign rectum with reactive changes and " erosion, extensive adhesions    5. Proximal anastomotic ring (excision):    No dysplasia.  No carcinoma.  Benign colonic mucosa    6. Distal anastomotic ring (excision):  No dysplasia.  No carcinoma benign rectal mucosa       Cancer synoptic report  Procedure:  Sigmoid colectomy, low anterior resection  Tumor site:  Sigmoid  Histologic type: Adenocarcinoma  Histologic grade:  G2, moderately differentiated  Tumor size:  2.8 x 2.0 cm  Tumor extent:  Invades through muscularis propria into pericolonic tissue  Macroscopic tumor perforation: Not identified  Lymphatic and/or vascular invasion:  Present, small vessel  Perineural invasion: Not identified  Tumor budding score: Intermediate (8)  Treatment effect:  Present with residual cancer showing evident tumor regression but more than rare small groups (partial response, score 2)    Margins  All margins negative for dysplasia  All margins negative for invasive carcinoma.      pTNM Classification (AJCC 8th edition)  Modified classification: y(post-neoadjuvant therapy)    pT Category  pT3:  Tumor invades through the muscularis propria into pericolorectal tissues    pN Category  pN0:  No regional lymph node metastasis  Number of lymph nodes examined:  Twelve (12)  Tumor deposits: Not identified    pM Category  No tissue submitted.  Given history of synchronous liver metastasis with interval reduction/resolution per 04/10/2024 MRI.    Tumor blocks:  1C-I.  Best tumor block 1I          Somatic Genomic Results       Malignant neoplasm of sigmoid colon       Tumor NGS Tissue: [id number]  Specimen Info: Tissue Resulted: 8/19/24 Status: Final result       Detected - Pathogenic (2)      Gene Variant VAF   PHLPP1 PHLPP1 - Copy number loss --   TP53 p.C277fs - c.828del Frameshift - LOF 42.80%              Detected - Uncertain significance (6)      Gene Variant VAF   [id number]  Splice region variant 29.00%   LRP1B p.P4808H - c.1295_9611delinsCAC Missense variant 21.00%   MSH6 p.A48V -  c.143C>T Missense variant 11.10%   PREX2  Splice region variant 15.10%   PRSS1 p.G49V - c.146G>T Missense variant 25.20%   SPEN p.N9023A - c.6523G>T Missense variant 24.50%                              Metastasis to liver       Tumor NGS Tissue: [id number]  Specimen Info: Tissue Resulted: 8/19/24 Status: Final result       Detected - Pathogenic (2)      Gene Variant VAF   PHLPP1 PHLPP1 - Copy number loss --   TP53 p.C277fs - c.828del Frameshift - LOF 42.80%              Detected - Uncertain significance (6)      Gene Variant VAF   LFPD8T7  Splice region variant 29.00%   LRP1B p.P4841C - c.8539_7655delinsCAC Missense variant 21.00%   MSH6 p.A48V - c.143C>T Missense variant 11.10%   PREX2  Splice region variant 15.10%   PRSS1 p.G49V - c.146G>T Missense variant 25.20%   SPEN p.W8256J - c.6523G>T Missense variant 24.50%                                        Assessment:       1. Malignant neoplasm of sigmoid colon    2. Metastatic colon cancer to liver    3. History of cervical cancer    4. Drug rash          Plan:             # Sigmoid colon adenocarcinoma  She has sigmoid colon adenocarcinoma, pMMR, diagnosed 12/2023.  De sanket metastatic to liver. Treatment at [facility]. She is s/p FOLFOX x 3 cycles, poorly tolerated related to question of fluorouracil induced cardiotoxicity and high LFTs.  She had very good imaging response in liver. Saw Dr. Yuan & Dr. Paris at [facility].  Underwent robotic sigmoid colectomy with loop ileostomy 5/9/24.  Underwent MWA 6/10/24 to segment V liver metastasis.  Started pembrolizumab with Dr. Mathias with subsequent disease progression in liver.  Underwent ileostomy closure on 8/29/24 with Dr. Paris.  Started on oxaliplatin + panitumumab 9/30/24.  She has received 4 cycles of the above regimen with fair tolerance.    Overall she is doing well.  Has very good biochemical response with almost normalization of her CEA (30s --> 7) per her report since starting oxali + p-mab.   MRI liver  read as stable disease after three cycles.  Discussed possible management options including potential resection/ablation with Dr. Yuan if feasible vs continued chemotherapy vs DARRYL pump now or later vs possible future liver transplantation if she has continued disease control in liver without extrahepatic disease.  We will discuss her case at our [facility] tumor board to consider next steps and notify her.    # Rash  Secondary to panitumumab.  Continue doxy, topical triamcinolone, topical clinda.  Improving with time.    # History of cervical cancer  Underwent treatment about 25 years ago with external beam radiation and brachytherapy.  No evidence of recurrent disease.  Genetic testing identified FANCC gene mutation.    Follow up: PRN     The above information has been reviewed with the patient and all questions have been answered to their apparent satisfaction.  They understand that they can call the clinic with any questions.    Donald Palacios MD  Hematology/Oncology  [facility]    Route Chart for Scheduling    Med Onc Chart Routing      Follow up with physician No follow up needed.   Follow up with KAREN    Infusion scheduling note    Injection scheduling note    Labs    Imaging    Pharmacy appointment    Other referrals

## 2024-11-13 NOTE — PROGRESS NOTES
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 She has been working up a sigmoid polyp/mass for about a year, with multiple biopsies negative for malignancy despite a high risk clinical lesion. Repeat colonoscopy and biopsies in December finally demonstrated adenocarcinoma.  She has hx uterine cancer at age 24 (1999) managed with preoperative RT and then hysterectomy/lymphadenectomy with ovarian preservation/relocation. Because of this history and her family history she has been getting screening colonoscopy every 3 years.     Family Hx of CRC (Grandfather- age 67)     Colonoscopy 12/23 with 2-3cm ulcerated sessile polyp.       CT/MRI:               On my review I count at least 5 discrete lesions, the largest of which is about 1cm in diameter in the right anterior liver. These are all in the right liver, with a potential 6th lesion on DWI in the left lateral liver. Her imaging reveals no other clear sites of metastatic disease. She has bilateral paracolic gutter "collections" which on my review are actually likely her relocated ovaries.     CEA: 2.2 (wnl)     PET: report and images pending, unfortunately her disc has only partial images     A/P:  Collette is 48, healthy, with hx pelvic RT and a new sigmoid colon cancer with liver metastases clinically. We had a long discussion on the phone recently and reviewed that discussion again here. Plan to start her on systemic therapy 4-6 cycles and review her response, and I have discussed her case with Dr. Mathias. We reviewed our likely options after induction systemic therapy. She has multifocal disease, mostly confined to the right liver. These are all high risk for disappearing lesions on chemo, and she has extremely high risk for in liver recurrence or inadequate surgical/interventional clearance. I see no evidence of extrahepatic disease. We discussed locoregional strategies " including resection, ablation, Y90 and importantly in her case, adjuvant DARRYL pump therapy and the potential role for liver transplant in the future should she have recurrent disease. Of these, given the distribution I think our most likely path is to proceed with primary tumor resection and liver clearance to the best of our ability with placement of DARRYL pump for adjuvant liver directed therapy for residual disease.     RTC after 4 cycles systemic therapy with restaging CT C/A/P panc/liver protocol and labs. She may benefit from MRI as well but will plan to evaluate her CT imaging first. Will obtain her PET for review, and re-present at [facility] conference after restaging.  -------------------------  4/17/2024:     Collette returns after a tough few cycles of systemic therapy which she has really struggled with. Dr. Mathias has dropped her bolus 5-FU and she has had several delays due to thrombocytopenia, elevated LFTs and diarrhea.     Repeat MRI:       She has responded great, I can only see one lesion on DWI. Seen Dr. Paris to restage her rectosigmoid mass. I suspect the answer here is just to treat her sigmoid cancer and ablate this solitary liver lesion and hold off on DARRYL placement. She understands high risk of intrahepatic recurrence and potential need for further liver directed therapy.     Present at [facility]  F/u Dr. Paris flex sig and surgical planning. Will regroup after conference Thursday, I think a VV to keep her on the radar.  ----------------------------------------  4/29/2024:     Collette has been presented at [facility] and primary colorectal conference. Will proceed with MWA of her lone remaining liver tumor and move forward with primary resection. Cont to follow her liver closely.  -------------------------------  7/17/2024:     Since our last visit Collette underwent primary resection with loop ileostomy and MWA of her liver lesion.     We reviewed her most recent PET and lab results. She has just restarted  therapy, and was started on single agent Keytruda 2 weeks ago. By pet (I do not have her images yet) she has progression in her liver which really reflects her time off therapy for her colon resection. At least based on the snippets I've seen, this all looks like progression of her previously known lesions. I would love her on cytotoxic therapy, but she did terribly on it previously so I understand the desire to find something more manageable. Nonetheless I think we need pretty short interval imaging here to make sure we have stable disease on treatment. I am going to see her back in [place] , which will give her a good 7-8 weeks on therapy and restage with an MRI to discuss how to move forward, whether locoregional treatment or shifting systemic therapy. She has/had DARRYL option, as well as resection ablation options we need to evaluate.   ----------------------------------  2024:     Collette returns now with repeat staging after about 7 weeks on immunotherapy for her recurrent colorectal cancer to the liver. Her CEA is now 22 which is elevated but I dont have a good series to see a trend on immunotherapy.     Patient has had issue with dehydration due to increased ostomy output. She is doing well otherwise. No SOB or VILLARREAL. Doing well with diet, no n/v. Still works as NP.      Repeat imagin2024  CT:  - History of colon cancer status post LAR and diverting loop ileostomy.  No evidence of bowel obstruction or anastomotic leak.  - History of cervical cancer status post partial hysterectomy, ovarian pinning, and lymph node dissection.  - No lymphadenopathy.  - Post ablation changes in the right hepatic lobe.  Multiple new liver lesions measuring up to 1.8 cm, concerning for worsening metastases.        Her tempus did return with a FANCC mutation so we may have clinical trial options down the line related to this:       A/P:  50yo with progression of colorectal liver metastases on single agent  keytruda. I have discussed her case with Basilia Paris and Mey. She is not handling her diverting loop ileostomy well, with recurrent GRAYSON. We need to get her back on systemic chemotherapy asap, but I doubt we can accomplish this with her loop. Dr. Paris is willing to move expeditiously to close her ostomy, and Dr. Mathias will restart her on cytotoxic therapy thereafter. If we can stabilize her, at her next restaging we discussed moving towards resection/ablation and DARRYL placement for adjuvant therapy. She lives in [place], I would like to get her to see Dr. Palacios on her next restaging to discuss pump therapy.  -------------------------  8/28/2024:    Quick review for Collette. Her last imaging pretty clearly shows progression. I have talked to Dr. Paris who intends to close her stoma tomorrow. I have talked with Dr. Mathias who will restart her on systemic chemotherapy thereafter. We discussed resection/ablation +/- DARRYL, which I do think benefits her, if we can stabilize her disease. I would like her to meet with Dr. Palacios at our next in person visit to discuss DARRYL. Followup in 3mos with restaging scans.    -------------------------  11/13/2024:  Currently undergoing chemotherapy (Vectibix and Oxaliplatin) every 2 weeks, just finished fourth treatment. No longer taking Keytruda. Saw Dr Palacios today as well. Underwent ileostomy closure, reports incomplete emptying, and occasional incontinence. No nausea or vomiting.     I have reviewed her updated imaging. She has stable to responding liver disease, and no new lesions. I have reviewed her imaging in conference in detail. I think this is amenable to multiple non-anatomic resections. Her eFLR with R hepatectomy is only 20%, so she would require PVE prior to any anatomic resection. Will talk to Dr. Mathias but I think we move forward with resection workup and plan to offer surveillance post operatively.    I spent 30 minutes with Ms. Castro and her , with  >50% of time spent face to face and additional time preparing to see the patient (eg, review of tests), obtaining and/or reviewing separately obtained history, documenting clinical information in the electronic or other health record, independently interpreting results (not separately reported) and communicating results to the patient/family/caregiver, or Care coordination (not separately reported).         Kimo Yuan MD  Upper GI / Hepatobiliary Surgical Oncology  [facility] [place], [place]  Office: [phone]  Fax: [phone]

## 2024-11-13 NOTE — Clinical Note
Presented in conference, I would like to discuss resection with her. Can we reach out and offer her VV or in person, her preference, so discuss?

## 2024-11-14 ENCOUNTER — TELEPHONE (OUTPATIENT)
Dept: TRANSPLANT | Facility: CLINIC | Age: 49
End: 2024-11-14
Payer: COMMERCIAL

## 2024-11-14 ENCOUNTER — DOCUMENTATION ONLY (OUTPATIENT)
Dept: HEMATOLOGY/ONCOLOGY | Facility: CLINIC | Age: 49
End: 2024-11-14
Payer: COMMERCIAL

## 2024-11-14 NOTE — PROGRESS NOTES
Met with pt and spouse. Pt reports she has been doing good overall and is planning for jail given work is too difficult when she is receiving chemo. Instructed pt to reach out to the office at any time with questions and/or concerns. Pt verbalized understanding.

## 2024-11-14 NOTE — PROGRESS NOTES
[facility]     Colorectal Liver Metastasis Tumor Board Note      Date: 11/14/2024    Referring Provider: Dr. Yuan     Case Overview: Mrs. Castro (49F) was initially diagnosed in 12/2023 with sigmoid adenocarcinoma with synchronous liver metastasis. She started FOLFOX in 02/2024 with modifications after adverse reaction to cycle 1. Underwent LAR with loop ileostomy in 5/2024 followed by liver ablation in 6/2024. Started Keytruda in 7/2024 with progression noted. Has germline mutation of FANCC gene. Most recently started vectibix + oxaliplatin s/p 4 cycles.      Participants: medical oncology, surgical oncology, colorectal surgery, transplant surgeons, interventional radiology, and oncology navigator     Imaging Reviewed: MRI, CT 11/2024    Radiology Review:     Orders/Referrals: transplant referral    Board Recommendations: Discuss right hepatectomy. Refer to transplant to initiate discussions (can coordinate when she comes back for next appointment with surg onc).

## 2024-11-14 NOTE — TELEPHONE ENCOUNTER
Referral received from DR Yuan.     Patient referred for surgical consult for  Discuss right hepatectomy secondary to sigmoid adenocarcinoma with synchronous liver metastasis. ICD-10:  ICD-10-CM: C78.7  Metastasis to liver    Referred for liver transplant for SURGICAL CONSULT ONLY  Referral completed and forwarded to [facility].          Insurance: EPIC

## 2024-11-15 ENCOUNTER — TELEPHONE (OUTPATIENT)
Dept: SURGERY | Facility: CLINIC | Age: 49
End: 2024-11-15
Payer: COMMERCIAL

## 2024-11-15 NOTE — TELEPHONE ENCOUNTER
----- Message from Kimo Yuan MD sent at 11/14/2024  4:06 PM CST -----  Presented in conference, I would like to discuss resection with her. Can we reach out and offer her VV or in person, her preference, so discuss?

## 2024-11-15 NOTE — TELEPHONE ENCOUNTER
Call placed to pt to schedule appt. Offered in person or VV. Pt prefers Vv. She will be at her daughters house in [place] for VV. Appt scheduled for 11/27. Details and instructions provided. Pt verbalized understanding.

## 2024-11-19 ENCOUNTER — OFFICE VISIT (OUTPATIENT)
Dept: SURGERY | Facility: CLINIC | Age: 49
End: 2024-11-19
Payer: COMMERCIAL

## 2024-11-19 VITALS
HEIGHT: 70 IN | DIASTOLIC BLOOD PRESSURE: 68 MMHG | BODY MASS INDEX: 19.57 KG/M2 | RESPIRATION RATE: 16 BRPM | WEIGHT: 136.69 LBS | TEMPERATURE: 98 F | SYSTOLIC BLOOD PRESSURE: 110 MMHG | OXYGEN SATURATION: 99 % | HEART RATE: 69 BPM

## 2024-11-19 DIAGNOSIS — Z08 ENCOUNTER FOR FOLLOW-UP SURVEILLANCE OF COLON CANCER: Primary | ICD-10-CM

## 2024-11-19 DIAGNOSIS — Z85.038 ENCOUNTER FOR FOLLOW-UP SURVEILLANCE OF COLON CANCER: Primary | ICD-10-CM

## 2024-11-19 PROCEDURE — 99999 PR PBB SHADOW E&M-EST. PATIENT-LVL V: CPT | Mod: PBBFAC,TXP,, | Performed by: COLON & RECTAL SURGERY

## 2024-11-19 NOTE — PROGRESS NOTES
HPI:  Collette Castro is a 48 y.o. female with history of colon CA, sigmod     History of cervical CA.  Cisplatin and XRT.   Surgery - Treated with pinning of ovaries upper abd, lymphadenectomy and XRT (interstitial, EBRT?) in her late 20's     Asymptomatic  Screening colonoscopies.         12-  Colonoscopy - [facility]    Staging workup revealed liver metastases.     1- CT scan:    Multiple liver hypodensities  Paracolic gutter soft tissue densities (likely ovaries per pt)        1-  MRI abdomen          *4 cycles of chemoRX with reactions after 1st cycle - see Dr Mathias note:       *Oxaliplatin stopped.  QOW 5 FU       *Excellent response with only one visible lesion seen - decreased in size      4-  PET           4-  MRI abdomen       4-  Interval hx:    Here today for consideration of colorectal resection  No abd pain.  No rectal bleeding.  No wt loss.    Good activity level.   No CP after Oxaliplatin stopped.       4-  Interval hx:  Again remains asymptomatic.  No abdominal pain or bleeding.           5-  robotic low anterior resection, DLI  Findings:   No visible or palpable liver metastases  No peritoneal disease  Proximal sigmoid colon cancer  Extensive adhesions of left colon, sigmoid colon  Evidence of radiation changes of the terminal ileum, sigmoid colon and rectum  Fragile, chronic irradiated proximal rectum which was not suitable for anastomosis and necessitated low anterior resection with mid rectal anastomosis.  Splenic flexure mobilization was performed.  ICG scintigraphy confirmed excellent perfusion of the proximal conduit (descending colon) as well as the distal conduit (rectum)  Flexible sigmoidoscopy confirmed an intact, hemostatic and air tight anastomosis  Diverting ileostomy was performed due tto the chronic irradiation change and fragility of the rectal remnant.      8-  DLI closure    11-  MRI  abdomen  FINDINGS:  Multifocal hypovascular hepatic lesions are present.  These demonstrate mild T2 hyperintensity, with some demonstrating a target appearance.  The largest is a segment 5 mass measuring 2.2 cm, without significant change.  No distinctly new or enlarging lesions are demonstrated.     There has been a cholecystectomy.  No biliary dilatation.  A few pancreatic cystic lesions are present.  Largest is a pancreatic head measuring 7 mm.  No enhancing component.  No significant change the main pancreatic duct is nondilated.     The spleen, adrenal glands, and kidneys are normal.     Impression:     Multifocal hepatic lesions compatible with metastatic disease.  No significant interval change.     Unchanged subcentimeter pancreatic cystic lesions measuring up to 7 mm.  Attention on 2 year follow-up is recommended.    11-  CT chest  Impression:     New juxtapleural solid noncalcified nodule measuring 7 mm in the left lower lobe.     Otherwise no new nodules are identified.  No mediastinal lymphadenopathy.     Multiple ill-defined lesions in the liver, better evaluated on prior examinations.     Additional findings as described.    11-  Interval hx:    Feels well.  Put in for disability, FPC from [employer]  No nausea or vomiting  Tolerating chemoRX save cutaneous rash which is better.    No abd pain.  BMs improving - formed most of time.  No blood      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 Analyses with CancerNext-Expanded (77 genes)    Pre-diabetes     Sphincter of Oddi dysfunction         Past Surgical History:   Procedure Laterality Date    CHOLECYSTECTOMY      COLONOSCOPY      CYSTOSCOPY W/ URETERAL STENT PLACEMENT Bilateral 5/9/2024    Procedure: CYSTOSCOPY, WITH URETERAL STENT INSERTION;  Surgeon: Cas Bang MD;  Location: [facility] OR [address] [place] [address];   Service: Urology;  Laterality: Bilateral;    EXPLORATORY LAPAROTOMY      FLEXIBLE SIGMOIDOSCOPY N/A 5/9/2024    Procedure: SIGMOIDOSCOPY, FLEXIBLE;  Surgeon: SANTIAGO Paris MD;  Location: [facility] OR 2ND FLR;  Service: Colon and Rectal;  Laterality: N/A;    ILEOSTOMY CLOSURE N/A 8/29/2024    Procedure: CLOSURE, ILEOSTOMY;  Surgeon: SANTIAGO Paris MD;  Location: [facility] OR;  Service: Colon and Rectal;  Laterality: N/A;    LOW ANTERIOR RESECTION OF COLON N/A 5/9/2024    Procedure: RESECTION, COLON, LOW ANTERIOR, robotic converted to open;  Surgeon: SANTIAGO Paris MD;  Location: [facility] OR 2ND FLR;  Service: Colon and Rectal;  Laterality: N/A;    LYSIS OF ADHESIONS  5/9/2024    Procedure: LYSIS, ADHESIONS;  Surgeon: SANTIAGO Paris MD;  Location: [facility] OR 2ND FLR;  Service: Colon and Rectal;;  extensive, greater  than 2 hours; 22 modifier    MOBILIZATION, SPLENIC FLEXURE, LAPAROSCOPIC  5/9/2024    Procedure: MOBILIZATION, SPLENIC FLEXURE, LAPAROSCOPIC;  Surgeon: SANTIAGO Paris MD;  Location: [facility] OR 2ND FLR;  Service: Colon and Rectal;;  22 modifier; greater than 2 hours    OVARY SURGERY      relocation    ROBOT-ASSISTED LAPAROSCOPIC RESECTION OF SIGMOID COLON USING DA ESTEFANI XI N/A 5/9/2024    Procedure: XI ROBOTIC COLECTOMY, SIGMOID;  Surgeon: SANTIAGO Paris MD;  Location: [facility] OR 2ND FLR;  Service: Colon and Rectal;  Laterality: N/A;  converted to open    thryoid nodule         Review of patient's allergies indicates:   Allergen Reactions    Codeine Other (See Comments)     Per patient, causes pancreatitis    Hydrocodone bitartrate     Hydrocodone-acetaminophen Other (See Comments)    Opioids - morphine analogues      Chest pain/Okay w/Fentanyl 50 mcg - received in ED x3 over 6 hours without issues.    Oxycodone      Pancreatitis w/ all oral narctotics    Tramadol hcl      pancreatitis       Family History   Problem Relation Name Age of Onset    Colon cancer Maternal Grandfather  67    Lymphoma Maternal Cousin       Lung cancer Other         Social History     Socioeconomic History    Marital status:    Tobacco Use    Smoking status: Never    Smokeless tobacco: Never   Substance and Sexual Activity    Alcohol use: Not Currently     Comment: once a month    Drug use: Never    Sexual activity: Yes     Partners: Male     Birth control/protection: See Surgical Hx     Social Drivers of Health     Financial Resource Strain: Low Risk  (5/16/2024)    Overall Financial Resource Strain (CARDIA)     Difficulty of Paying Living Expenses: Not hard at all   Food Insecurity: No Food Insecurity (9/4/2024)    Hunger Vital Sign     Worried About Running Out of Food in the Last Year: Never true     Ran Out of Food in the Last Year: Never true   Transportation Needs: No Transportation Needs (9/4/2024)    TRANSPORTATION NEEDS     Transportation : No   Physical Activity: Insufficiently Active (5/16/2024)    Exercise Vital Sign     Days of Exercise per Week: 3 days     Minutes of Exercise per Session: 30 min   Stress: No Stress Concern Present (5/16/2024)    [employer] - Occupational Stress Questionnaire     Feeling of Stress : Not at all   Housing Stability: Low Risk  (9/4/2024)    Housing Stability Vital Sign     Unable to Pay for Housing in the Last Year: No     Homeless in the Last Year: No       ROS:  GENERAL: No fever, chills, fatigability or weight loss.  Integument: No rashes, redness, icterus  CHEST: Denies VILLARREAL, cyanosis, wheezing, cough and sputum production.  CARDIOVASCULAR: Denies chest pain, PND, orthopnea or reduced exercise tolerance.  GI: Denies abd pain, dysphagia, nausea, vomiting, no hematemesis   : Denies burning on urination, no hematuria, no bacteriuria  MSK: No deformities, swelling, joint pain swelling  Neurologic: No HAs, seizures, weakness, paresthesias, gait problems    PE:  General appearance well  /68 (BP Location: Left arm, Patient Position: Sitting)   Pulse 69   Temp  "98 °F (36.7 °C) (Temporal)   Resp 16   Ht 5' 10" (1.778 m)   Wt 62 kg (136 lb 11 oz)   SpO2 99%   BMI 19.61 kg/m²   Sclera/ Skin anicteric  LN none palpable  AT NC EOMI  Neck supple trachea midline   Chest symmetric, nl excursion, no retractions, breathing comfortably  Abdomen    ND soft NT.  no masses, no organomegaly  EXT - no CCE  Neuro:  Mood/ affect nl, alert and oriented x 3, moves all ext's, gait nl      Assessment:  Stage IV sigmoid colon CA  Stable liver metastases.  Possible new lung metastasis    Plan:  Discussed with pt  Needs surveillance colonoscopy in May 2025  Metasectomy per Dr Yuan  RTC 6 months.        "

## 2024-11-25 NOTE — H&P (VIEW-ONLY)
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 She has been working up a sigmoid polyp/mass for about a year, with multiple biopsies negative for malignancy despite a high risk clinical lesion. Repeat colonoscopy and biopsies in December finally demonstrated adenocarcinoma.  She has hx uterine cancer at age 24 (1999) managed with preoperative RT and then hysterectomy/lymphadenectomy with ovarian preservation/relocation. Because of this history and her family history she has been getting screening colonoscopy every 3 years.     Family Hx of CRC (Grandfather- age 67)     Colonoscopy 12/23 with 2-3cm ulcerated sessile polyp.       CT/MRI:               On my review I count at least 5 discrete lesions, the largest of which is about 1cm in diameter in the right anterior liver. These are all in the right liver, with a potential 6th lesion on DWI in the left lateral liver. Her imaging reveals no other clear sites of metastatic disease. She has bilateral paracolic gutter "collections" which on my review are actually likely her relocated ovaries.     CEA: 2.2 (wnl)     PET: report and images pending, unfortunately her disc has only partial images     A/P:  Collette is 48, healthy, with hx pelvic RT and a new sigmoid colon cancer with liver metastases clinically. We had a long discussion on the phone recently and reviewed that discussion again here. Plan to start her on systemic therapy 4-6 cycles and review her response, and I have discussed her case with Dr. Mathias. We reviewed our likely options after induction systemic therapy. She has multifocal disease, mostly confined to the right liver. These are all high risk for disappearing lesions on chemo, and she has extremely high risk for in liver recurrence or inadequate surgical/interventional clearance. I see no evidence of extrahepatic disease. We discussed locoregional strategies " including resection, ablation, Y90 and importantly in her case, adjuvant DARRYL pump therapy and the potential role for liver transplant in the future should she have recurrent disease. Of these, given the distribution I think our most likely path is to proceed with primary tumor resection and liver clearance to the best of our ability with placement of DARRYL pump for adjuvant liver directed therapy for residual disease.     RTC after 4 cycles systemic therapy with restaging CT C/A/P panc/liver protocol and labs. She may benefit from MRI as well but will plan to evaluate her CT imaging first. Will obtain her PET for review, and re-present at [facility] conference after restaging.  -------------------------  4/17/2024:     Collette returns after a tough few cycles of systemic therapy which she has really struggled with. Dr. Mathias has dropped her bolus 5-FU and she has had several delays due to thrombocytopenia, elevated LFTs and diarrhea.     Repeat MRI:       She has responded great, I can only see one lesion on DWI. Seen Dr. Paris to restage her rectosigmoid mass. I suspect the answer here is just to treat her sigmoid cancer and ablate this solitary liver lesion and hold off on DARRYL placement. She understands high risk of intrahepatic recurrence and potential need for further liver directed therapy.     Present at [facility]  F/u Dr. Paris flex sig and surgical planning. Will regroup after conference Thursday, I think a VV to keep her on the radar.  ----------------------------------------  4/29/2024:     Collette has been presented at [facility] and primary colorectal conference. Will proceed with MWA of her lone remaining liver tumor and move forward with primary resection. Cont to follow her liver closely.  -------------------------------  7/17/2024:     Since our last visit Collette underwent primary resection with loop ileostomy and MWA of her liver lesion.     We reviewed her most recent PET and lab results. She has just restarted  therapy, and was started on single agent Keytruda 2 weeks ago. By pet (I do not have her images yet) she has progression in her liver which really reflects her time off therapy for her colon resection. At least based on the snippets I've seen, this all looks like progression of her previously known lesions. I would love her on cytotoxic therapy, but she did terribly on it previously so I understand the desire to find something more manageable. Nonetheless I think we need pretty short interval imaging here to make sure we have stable disease on treatment. I am going to see her back in [place] , which will give her a good 7-8 weeks on therapy and restage with an MRI to discuss how to move forward, whether locoregional treatment or shifting systemic therapy. She has/had DARRYL option, as well as resection ablation options we need to evaluate.   ----------------------------------  2024:     Collette returns now with repeat staging after about 7 weeks on immunotherapy for her recurrent colorectal cancer to the liver. Her CEA is now 22 which is elevated but I dont have a good series to see a trend on immunotherapy.     Patient has had issue with dehydration due to increased ostomy output. She is doing well otherwise. No SOB or VILLARREAL. Doing well with diet, no n/v. Still works as NP.      Repeat imagin2024  CT:  - History of colon cancer status post LAR and diverting loop ileostomy.  No evidence of bowel obstruction or anastomotic leak.  - History of cervical cancer status post partial hysterectomy, ovarian pinning, and lymph node dissection.  - No lymphadenopathy.  - Post ablation changes in the right hepatic lobe.  Multiple new liver lesions measuring up to 1.8 cm, concerning for worsening metastases.        Her tempus did return with a FANCC mutation so we may have clinical trial options down the line related to this:       A/P:  50yo with progression of colorectal liver metastases on single agent  keytruda. I have discussed her case with Basilia Paris and Mey. She is not handling her diverting loop ileostomy well, with recurrent GRAYSON. We need to get her back on systemic chemotherapy asap, but I doubt we can accomplish this with her loop. Dr. Paris is willing to move expeditiously to close her ostomy, and Dr. Mathias will restart her on cytotoxic therapy thereafter. If we can stabilize her, at her next restaging we discussed moving towards resection/ablation and DARRYL placement for adjuvant therapy. She lives in [place], I would like to get her to see Dr. Palacios on her next restaging to discuss pump therapy.  -------------------------  8/28/2024:     Quick review for Collette. Her last imaging pretty clearly shows progression. I have talked to Dr. Paris who intends to close her stoma tomorrow. I have talked with Dr. Mathias who will restart her on systemic chemotherapy thereafter. We discussed resection/ablation +/- DARRYL, which I do think benefits her, if we can stabilize her disease. I would like her to meet with Dr. Palacios at our next in person visit to discuss DARRYL. Followup in 3mos with restaging scans.     -------------------------  11/13/2024:  Currently undergoing chemotherapy (Vectibix and Oxaliplatin) every 2 weeks, just finished fourth treatment. No longer taking Keytruda. Saw Dr Palacios today as well. Underwent ileostomy closure, reports incomplete emptying, and occasional incontinence. No nausea or vomiting.      I have reviewed her updated imaging. She has stable to responding liver disease, and no new lesions. I have reviewed her imaging in conference in detail. I think this is amenable to multiple non-anatomic resections. Her eFLR with R hepatectomy is only 20%, so she would require PVE prior to any anatomic resection. Will talk to Dr. Mathias but I think we move forward with resection workup and plan to offer surveillance post operatively.  ---------------------------  11/27/2024:    The patient  location is: home  The chief complaint leading to consultation is: metastatic colon cancer  Visit type: AV  Total time spent with patient: 30 minutes     Each patient to whom he or she provides medical services by telemedicine is:  (1) informed of the relationship between the physician and patient and the respective role of any other health care provider with respect to management of the patient; and (2) notified that he or she may decline to receive medical services by telemedicine and may withdraw from such care at any time.              On CT/MRI there are 6 discrete lesions here and one ablation cavity. I have marked them all out on DWI on her most recent MRI. This is amenable to multiple non-anatomic resections which we discussed at [facility] tumor board. In the right posterior liver there are two lesions that are fairly adjacent and would come out as a single specimen.       She does have a CT chest with a 7mm pleural nodule. There is no effusion.      We discussed this finding and will continue to follow this. A solitary lung lesion shouldn't stop us from clearing her liver disease, but she may require lung directed therapy down the line should this lesion progress and prove to be metastatic disease.    Last chemo was Monday. Will hold further therapy and plan for December resection    A/P    Open hepatectomy Dec    I spent 30 minutes with Mrs. Castro, with >50% of time spent face to face and additional time preparing to see the patient (eg, review of tests), obtaining and/or reviewing separately obtained history, documenting clinical information in the electronic or other health record, independently interpreting results (not separately reported) and communicating results to the patient/family/caregiver, or Care coordination (not separately reported).           Kimo Yuan MD  Upper GI / Hepatobiliary Surgical Oncology  [facility] [place], [place]  Office: [phone]  Fax: [phone]

## 2024-11-25 NOTE — PROGRESS NOTES
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 She has been working up a sigmoid polyp/mass for about a year, with multiple biopsies negative for malignancy despite a high risk clinical lesion. Repeat colonoscopy and biopsies in December finally demonstrated adenocarcinoma.  She has hx uterine cancer at age 24 (1999) managed with preoperative RT and then hysterectomy/lymphadenectomy with ovarian preservation/relocation. Because of this history and her family history she has been getting screening colonoscopy every 3 years.     Family Hx of CRC (Grandfather- age 67)     Colonoscopy 12/23 with 2-3cm ulcerated sessile polyp.       CT/MRI:               On my review I count at least 5 discrete lesions, the largest of which is about 1cm in diameter in the right anterior liver. These are all in the right liver, with a potential 6th lesion on DWI in the left lateral liver. Her imaging reveals no other clear sites of metastatic disease. She has bilateral paracolic gutter "collections" which on my review are actually likely her relocated ovaries.     CEA: 2.2 (wnl)     PET: report and images pending, unfortunately her disc has only partial images     A/P:  Collette is 48, healthy, with hx pelvic RT and a new sigmoid colon cancer with liver metastases clinically. We had a long discussion on the phone recently and reviewed that discussion again here. Plan to start her on systemic therapy 4-6 cycles and review her response, and I have discussed her case with Dr. Mathias. We reviewed our likely options after induction systemic therapy. She has multifocal disease, mostly confined to the right liver. These are all high risk for disappearing lesions on chemo, and she has extremely high risk for in liver recurrence or inadequate surgical/interventional clearance. I see no evidence of extrahepatic disease. We discussed locoregional strategies " including resection, ablation, Y90 and importantly in her case, adjuvant DARRYL pump therapy and the potential role for liver transplant in the future should she have recurrent disease. Of these, given the distribution I think our most likely path is to proceed with primary tumor resection and liver clearance to the best of our ability with placement of DARRYL pump for adjuvant liver directed therapy for residual disease.     RTC after 4 cycles systemic therapy with restaging CT C/A/P panc/liver protocol and labs. She may benefit from MRI as well but will plan to evaluate her CT imaging first. Will obtain her PET for review, and re-present at [facility] conference after restaging.  -------------------------  4/17/2024:     Collette returns after a tough few cycles of systemic therapy which she has really struggled with. Dr. Mathias has dropped her bolus 5-FU and she has had several delays due to thrombocytopenia, elevated LFTs and diarrhea.     Repeat MRI:       She has responded great, I can only see one lesion on DWI. Seen Dr. Paris to restage her rectosigmoid mass. I suspect the answer here is just to treat her sigmoid cancer and ablate this solitary liver lesion and hold off on DARRYL placement. She understands high risk of intrahepatic recurrence and potential need for further liver directed therapy.     Present at [facility]  F/u Dr. Paris flex sig and surgical planning. Will regroup after conference Thursday, I think a VV to keep her on the radar.  ----------------------------------------  4/29/2024:     Collette has been presented at [facility] and primary colorectal conference. Will proceed with MWA of her lone remaining liver tumor and move forward with primary resection. Cont to follow her liver closely.  -------------------------------  7/17/2024:     Since our last visit Collette underwent primary resection with loop ileostomy and MWA of her liver lesion.     We reviewed her most recent PET and lab results. She has just restarted  therapy, and was started on single agent Keytruda 2 weeks ago. By pet (I do not have her images yet) she has progression in her liver which really reflects her time off therapy for her colon resection. At least based on the snippets I've seen, this all looks like progression of her previously known lesions. I would love her on cytotoxic therapy, but she did terribly on it previously so I understand the desire to find something more manageable. Nonetheless I think we need pretty short interval imaging here to make sure we have stable disease on treatment. I am going to see her back in [place] , which will give her a good 7-8 weeks on therapy and restage with an MRI to discuss how to move forward, whether locoregional treatment or shifting systemic therapy. She has/had DARRYL option, as well as resection ablation options we need to evaluate.   ----------------------------------  2024:     Collette returns now with repeat staging after about 7 weeks on immunotherapy for her recurrent colorectal cancer to the liver. Her CEA is now 22 which is elevated but I dont have a good series to see a trend on immunotherapy.     Patient has had issue with dehydration due to increased ostomy output. She is doing well otherwise. No SOB or VILLARREAL. Doing well with diet, no n/v. Still works as NP.      Repeat imagin2024  CT:  - History of colon cancer status post LAR and diverting loop ileostomy.  No evidence of bowel obstruction or anastomotic leak.  - History of cervical cancer status post partial hysterectomy, ovarian pinning, and lymph node dissection.  - No lymphadenopathy.  - Post ablation changes in the right hepatic lobe.  Multiple new liver lesions measuring up to 1.8 cm, concerning for worsening metastases.        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 I have discussed her case with Basilia Paris and Mey. She is not handling her diverting loop ileostomy well, with recurrent GRAYSON. We need to get her back on systemic chemotherapy asap, but I doubt we can accomplish this with her loop. Dr. Paris is willing to move expeditiously to close her ostomy, and Dr. Mathias will restart her on cytotoxic therapy thereafter. If we can stabilize her, at her next restaging we discussed moving towards resection/ablation and DARRYL placement for adjuvant therapy. She lives in [place], I would like to get her to see Dr. Palacios on her next restaging to discuss pump therapy.  -------------------------  8/28/2024:     Quick review for Collette. Her last imaging pretty clearly shows progression. I have talked to Dr. Paris who intends to close her stoma tomorrow. I have talked with Dr. Mathias who will restart her on systemic chemotherapy thereafter. We discussed resection/ablation +/- DARRYL, which I do think benefits her, if we can stabilize her disease. I would like her to meet with Dr. Palacios at our next in person visit to discuss DARRYL. Followup in 3mos with restaging scans.     -------------------------  11/13/2024:  Currently undergoing chemotherapy (Vectibix and Oxaliplatin) every 2 weeks, just finished fourth treatment. No longer taking Keytruda. Saw Dr Palacios today as well. Underwent ileostomy closure, reports incomplete emptying, and occasional incontinence. No nausea or vomiting.      I have reviewed her updated imaging. She has stable to responding liver disease, and no new lesions. I have reviewed her imaging in conference in detail. I think this is amenable to multiple non-anatomic resections. Her eFLR with R hepatectomy is only 20%, so she would require PVE prior to any anatomic resection. Will talk to Dr. Mathias but I think we move forward with resection workup and plan to offer surveillance post operatively.  ---------------------------  11/27/2024:    The patient  location is: home  The chief complaint leading to consultation is: metastatic colon cancer  Visit type: AV  Total time spent with patient: 30 minutes     Each patient to whom he or she provides medical services by telemedicine is:  (1) informed of the relationship between the physician and patient and the respective role of any other health care provider with respect to management of the patient; and (2) notified that he or she may decline to receive medical services by telemedicine and may withdraw from such care at any time.              On CT/MRI there are 6 discrete lesions here and one ablation cavity. I have marked them all out on DWI on her most recent MRI. This is amenable to multiple non-anatomic resections which we discussed at [facility] tumor board. In the right posterior liver there are two lesions that are fairly adjacent and would come out as a single specimen.       She does have a CT chest with a 7mm pleural nodule. There is no effusion.      We discussed this finding and will continue to follow this. A solitary lung lesion shouldn't stop us from clearing her liver disease, but she may require lung directed therapy down the line should this lesion progress and prove to be metastatic disease.    Last chemo was Monday. Will hold further therapy and plan for December resection    A/P    Open hepatectomy Dec    I spent 30 minutes with Mrs. Castro, with >50% of time spent face to face and additional time preparing to see the patient (eg, review of tests), obtaining and/or reviewing separately obtained history, documenting clinical information in the electronic or other health record, independently interpreting results (not separately reported) and communicating results to the patient/family/caregiver, or Care coordination (not separately reported).           Kimo Yuan MD  Upper GI / Hepatobiliary Surgical Oncology  [facility] [place], [place]  Office: [phone]  Fax: [phone]

## 2024-11-27 ENCOUNTER — OFFICE VISIT (OUTPATIENT)
Dept: SURGERY | Facility: CLINIC | Age: 49
End: 2024-11-27
Payer: COMMERCIAL

## 2024-11-27 ENCOUNTER — PATIENT MESSAGE (OUTPATIENT)
Dept: SURGERY | Facility: CLINIC | Age: 49
End: 2024-11-27

## 2024-11-27 DIAGNOSIS — C78.7 METASTATIC COLON CANCER TO LIVER: Primary | ICD-10-CM

## 2024-11-27 DIAGNOSIS — C18.9 METASTATIC COLON CANCER TO LIVER: Primary | ICD-10-CM

## 2024-11-27 NOTE — TELEPHONE ENCOUNTER
Spoke to patient. Confirmed procedure for 12/12/2024 with Dr. Yuan.   Informed to [facility] the [facility] on the 2nd floor [address] [facility] for 0500  and surgery time is 0700. RN will call  day before to confirm surgery and arrival time. Surgery Instructions provided as follows:  instructed to remain NPO solids 8 hours prior to surgery, clear liquids until 2 hours prior to surgery, to shower with hibiclens/antibacterial soap the night before surgery and morning of surgery before arrival, not to apply any lotions, powders or deodorant, remove all metal and jewelry, to wear comfortable clothes, and leave all valuables at home. Medications reviewed and  instructed to bring medications on DOS. Pre op department will call to review medications and advise if medications need to be taken day of surgery. Confirmed pt  will have transportation home and spouse will accompany pt on DOS.  Post operative instructions reviewed. Tentative post op appt scheduled. Details provided.  Pt verbalized understanding to all of the above and instructed to contact us for any questions.

## 2024-11-27 NOTE — TELEPHONE ENCOUNTER
Call placed to pt to review inquiry via pt portal, plan of care, surgery date 12/12 as well as preop instructions. Left message with call back number.

## 2024-12-06 ENCOUNTER — TELEPHONE (OUTPATIENT)
Dept: SURGERY | Facility: CLINIC | Age: 49
End: 2024-12-06
Payer: COMMERCIAL

## 2024-12-06 NOTE — TELEPHONE ENCOUNTER
Returned call. Spoke to pt. Pt requesting completed blood bank form. Informed her will provide the form to Dr. Yuan for completion. Pt verbalized understanding.

## 2024-12-06 NOTE — TELEPHONE ENCOUNTER
----- Message from [employer] sent at 12/6/2024  8:54 AM CST -----  Regarding: Consult/Advisory  Contact: pt @ [phone]  Consult/Advisory     Name Of Caller: Collette Castro     Contact Preference:      Nature of call: message is for Nydia, pt is calling to get the status of the blood donor paperwork that she dropped off to you. Asking if you received paperwork by way of portal. Pt is having surgery next week, and  will donate blood to her, need paperwork today or early Monday morning. Asking for an urgent call back

## 2024-12-10 ENCOUNTER — CLINICAL SUPPORT (OUTPATIENT)
Dept: TRANSPLANT | Facility: CLINIC | Age: 49
End: 2024-12-10
Payer: COMMERCIAL

## 2024-12-10 ENCOUNTER — PATIENT MESSAGE (OUTPATIENT)
Dept: TRANSPLANT | Facility: CLINIC | Age: 49
End: 2024-12-10

## 2024-12-10 DIAGNOSIS — C78.7 METASTASIS TO LIVER: Primary | ICD-10-CM

## 2024-12-10 PROCEDURE — 99205 OFFICE O/P NEW HI 60 MIN: CPT | Mod: 95,,, | Performed by: TRANSPLANT SURGERY

## 2024-12-10 NOTE — PROGRESS NOTES
The patient location is: [place]  The chief complaint leading to consultation is: Metastatic colorectal cancer    Visit type: audiovisual    Face to Face time with patient: 15  30 minutes of total time spent on the encounter, which includes face to face time and non-face to face time preparing to see the patient (eg, review of tests), Obtaining and/or reviewing separately obtained history, Documenting clinical information in the electronic or other health record, Independently interpreting results (not separately reported) and communicating results to the patient/family/caregiver, or Care coordination (not separately reported).         Each patient to whom he or she provides medical services by telemedicine is:  (1) informed of the relationship between the physician and patient and the respective role of any other health care provider with respect to management of the patient; and (2) notified that he or she may decline to receive medical services by telemedicine and may withdraw from such care at any time.    Notes:      Liver Transplant / Hepatobiliary Surgery  Clinic Note    Referring Provider: Kimo Yuan MD     Subjective:     Reason for Visit: Metastatic colorectal cancer    History of Present Illness: Collette Castro is a 49 y.o. female referred for consultation regarding metastatic colorectal cancer to the liver. She initially presented with a sigmoid mass. Initial biopsies were benign, but due to a high suspicion for cancer, subsequent biopsies were peformed confirming the diagnosis. She had synchronous liver metastases at the time of diagnosis. She started FOLFOX in 02/2024 with modifications after adverse reaction to cycle 1. Underwent LAR with loop ileostomy in 5/2024 followed by liver ablation in 6/2024. Started Keytruda in 7/2024 with progression noted. Has germline mutation of FANCC gene. Most recently started vectibix + oxaliplatin s/p 4 cycles. She had a difficult course with the diverting  ileostomy (dehydration, weight loss, malnutrition) which was reversed in 8/24. On her most recent CT scan, she has multifocal bilobar disease. A majority of the lesions are peripheral in location and small. She is amenable to multiple non-anatomic resections and is scheduled with Dr Yuan later this week.     Review of Systems   Constitutional:  Positive for weight loss. Negative for chills and fever.   HENT:  Negative for congestion and tinnitus.    Eyes:  Negative for discharge and redness.   Respiratory:  Negative for cough and shortness of breath.    Cardiovascular:  Negative for chest pain and palpitations.   Gastrointestinal:  Negative for diarrhea and vomiting.   Genitourinary:  Negative for frequency and urgency.   Musculoskeletal:  Negative for joint pain and myalgias.   Skin:  Positive for rash.   Neurological:  Negative for tingling and weakness.   Endo/Heme/Allergies:  Does not bruise/bleed easily.   Psychiatric/Behavioral:  Negative for depression and suicidal ideas.         Objective:     Physical Exam     Deferred - video visit    MELD-Na score: MELD 3.0: 7 at 5/7/2024  4:22 PM  MELD-Na: 6 at 5/7/2024  4:22 PM  Calculated from:  Serum Creatinine: 0.8 mg/dL (Using min of 1 mg/dL) at 5/7/2024  4:22 PM  Serum Sodium: 139 mmol/L (Using max of 137 mmol/L) at 5/7/2024  4:22 PM  Total Bilirubin: 0.7 mg/dL (Using min of 1 mg/dL) at 5/7/2024  4:22 PM  Serum Albumin: 4.1 g/dL (Using max of 3.5 g/dL) at 5/7/2024  4:22 PM  INR(ratio): 1 at 5/7/2024  4:22 PM  Age at listing (hypothetical): 49 years  Sex: Female at 5/7/2024  4:22 PM       Sodium   Date Value Ref Range Status   09/16/2024 146 (H) 136 - 145 mmol/L Final     Potassium   Date Value Ref Range Status   09/16/2024 3.9 3.5 - 5.1 mmol/L Final     Chloride   Date Value Ref Range Status   09/16/2024 108 95 - 110 mmol/L Final     CO2   Date Value Ref Range Status   09/16/2024 25 23 - 29 mmol/L Final     Glucose   Date Value Ref Range Status   09/16/2024 96 70  - 110 mg/dL Final     BUN   Date Value Ref Range Status   09/16/2024 12 6 - 20 mg/dL Final     Creatinine   Date Value Ref Range Status   09/16/2024 1.0 0.5 - 1.4 mg/dL Final     Calcium   Date Value Ref Range Status   09/16/2024 8.1 (L) 8.7 - 10.5 mg/dL Final     Total Protein   Date Value Ref Range Status   09/16/2024 5.9 (L) 6.0 - 8.4 g/dL Final     Albumin   Date Value Ref Range Status   09/16/2024 3.1 (L) 3.5 - 5.2 g/dL Final     Total Bilirubin   Date Value Ref Range Status   09/16/2024 0.5 0.1 - 1.0 mg/dL Final     Comment:     For infants and newborns, interpretation of results should be based  on gestational age, weight and in agreement with clinical  observations.    Premature Infant recommended reference ranges:  Up to 24 hours.............<8.0 mg/dL  Up to 48 hours............<12.0 mg/dL  3-5 days..................<15.0 mg/dL  6-29 days.................<15.0 mg/dL       Alkaline Phosphatase   Date Value Ref Range Status   09/16/2024 63 55 - 135 U/L Final     AST   Date Value Ref Range Status   09/16/2024 30 10 - 40 U/L Final     ALT   Date Value Ref Range Status   09/16/2024 16 10 - 44 U/L Final     Anion Gap   Date Value Ref Range Status   09/16/2024 13 8 - 16 mmol/L Final     Lab Results   Component Value Date    WBC 4.46 09/06/2024    HGB 7.8 (L) 09/06/2024    HCT 23.8 (L) 09/06/2024    MCV 90 09/06/2024     09/06/2024       Lab Results   Component Value Date    INR 0.9 06/10/2024    INR 1.0 05/07/2024       Oncology History    No history exists.           Diagnoses:  Problem List Items Addressed This Visit    None       Assessment/Plan:     Given the multifocal distribution of her metastatic CRC to the liver, she is at high risk for recurrence after resection. In our CRC tumor board we have discussed potential options for the future if she recurs and determined to be unresectable. During our visit we discussed a potential role for liver transplantation in the future including what is involved  with the evaluation process, the potential timing and the logistical barriers. I emphasized the important role of living donation to control the timing of the hepatectomy and transplant and that the waiting time for a  donor liver is highly unpredictable. I explained the difficulty of performing a living donor liver transplant after DARRYL pump treatment, so knowing upfront about the potential living donor options would be important to prevent delays in care. I encouraged her to talk with her friends and family to identify any potential donors for the future if transplant is determined to be a treatment option.     I am optimistic that she can have a good result from surgical resection later this week and have a holiday from systemic chemotherapy. We will continue to discuss her case in our CRC tumor board to formulate the best plan of care for her going forward. All questions were addressed.      Nestor Vernon MD  Liver Transplant / Hepatobiliary Surgery  [facility]

## 2024-12-11 ENCOUNTER — ANESTHESIA EVENT (OUTPATIENT)
Dept: SURGERY | Facility: HOSPITAL | Age: 49
End: 2024-12-11
Payer: COMMERCIAL

## 2024-12-11 ENCOUNTER — TELEPHONE (OUTPATIENT)
Dept: SURGERY | Facility: CLINIC | Age: 49
End: 2024-12-11
Payer: COMMERCIAL

## 2024-12-11 DIAGNOSIS — C18.9 METASTATIC COLON CANCER TO LIVER: Primary | ICD-10-CM

## 2024-12-11 DIAGNOSIS — C78.7 METASTASES TO THE LIVER: ICD-10-CM

## 2024-12-11 DIAGNOSIS — C78.7 METASTATIC COLON CANCER TO LIVER: Primary | ICD-10-CM

## 2024-12-11 RX ORDER — CELECOXIB 200 MG/1
400 CAPSULE ORAL
Status: CANCELLED | OUTPATIENT
Start: 2024-12-11

## 2024-12-11 RX ORDER — FENTANYL CITRATE 50 UG/ML
25-200 INJECTION, SOLUTION INTRAMUSCULAR; INTRAVENOUS
OUTPATIENT
Start: 2024-12-11

## 2024-12-11 RX ORDER — MIDAZOLAM HYDROCHLORIDE 1 MG/ML
.5-4 INJECTION, SOLUTION INTRAMUSCULAR; INTRAVENOUS
OUTPATIENT
Start: 2024-12-11

## 2024-12-11 RX ORDER — HEPARIN SODIUM 5000 [USP'U]/ML
5000 INJECTION, SOLUTION INTRAVENOUS; SUBCUTANEOUS
Status: CANCELLED | OUTPATIENT
Start: 2024-12-11

## 2024-12-11 RX ORDER — METRONIDAZOLE 500 MG/100ML
500 INJECTION, SOLUTION INTRAVENOUS
Status: CANCELLED | OUTPATIENT
Start: 2024-12-11

## 2024-12-11 RX ORDER — ACETAMINOPHEN 500 MG
1000 TABLET ORAL
Status: CANCELLED | OUTPATIENT
Start: 2024-12-11

## 2024-12-11 NOTE — PRE-PROCEDURE INSTRUCTIONS
PreOp Instructions given:   - Verbal medication information (what to hold and what to take)   - NPO guidelines 2300  - Arrival place directions given; time to be given the day before procedure by the   Surgeon's Office 0500 DOSC  - Bathing with antibacterial soap   - Don't wear any jewelry or bring any valuables AM of surgery   - No makeup or moisturizer to face   - No perfume/cologne, powder, lotions or aftershave   Pt. verbalized understanding.   Pt denies any h/o Anesthesia/Sedation complications or side effects.

## 2024-12-11 NOTE — TELEPHONE ENCOUNTER
Spoke to patient. Confirmed procedure for 12/12/2024.   Informed to arrive at the [facility] on the 2nd floor [address] [facility] for 0500  and surgery time is 0700.  Surgery Instructions provided as follows:  instructed to remain NPO solids 8 hours prior to surgery, avoid greasy/high fat foods, clear liquids until 2 hours prior to surgery, to shower with hibiclens/antibacterial soap the night before surgery and morning of surgery before arrival, not to apply any lotions, powders or deodorant, remove all metal and jewelry, to wear comfortable clothes, and leave all valuables at home. Medications reviewed and  instructed to bring medications on DOS. Pre op department will call to review medications and advise if medications need to be taken day of surgery. Confirmed pt  will have transportation home and spouse will accompany pt on DOS.  Post operative instructions reviewed. Tentative post op appt details reviewed.  Pt  instructed to bring surgery folder  on DOS. Pt  verbalized understanding to all of the above and instructed to contact us for any questions.

## 2024-12-11 NOTE — ANESTHESIA PREPROCEDURE EVALUATION
[facility]  Anesthesia Pre-Operative Evaluation         Patient Name: Collette Castro  YOB: 1975  MRN: [id number]    SUBJECTIVE:     Pre-operative evaluation for Procedure(s) (LRB):  EXCISION, LIVER (N/A)     12/11/2024    Collette Castro is a 49 y.o. female w/ a significant PMHx of metastatic colorectal cancer to the liver, s/p colon resection and ileostomy which has since been reversed, and chemoradiation.     Patient now presents for the above procedure(s).    No previous echo on file    LDA: None documented.       PowerPort A Cath Single Lumen 08/29/24 1429 (Active)   Number of days: 104       Prev airway:   Induction:  Intravenous    Intubated:  Postinduction    Mask Ventilation:  Easy mask    Attempts:  1    Attempted By:  CRNA    Method of Intubation:  Video laryngoscopy    Blade:  Vincent 3    Laryngeal View Grade: Grade I - full view of cords      Difficult Airway Encountered?: No      Complications:  None    Airway Device:  Oral endotracheal tube    Airway Device Size:  7.5    Style/Cuff Inflation:  Cuffed    Tube secured:  21    Secured at:  The lips    Placement Verified By:  Capnometry    Complicating Factors:  None    Findings Post-Intubation:  BS equal bilateral and atraumatic/condition of teeth unchanged    Drips: None documented.      Patient Active Problem List   Diagnosis    Pneumonia due to COVID-19 virus    Malignant neoplasm of sigmoid colon    Ostomy nurse consultation    Personal history of irradiation    History of cervical cancer    Metastasis to liver    Small bowel obstruction    S/P closure of ileostomy       Review of patient's allergies indicates:   Allergen Reactions    Codeine Other (See Comments)     Per patient, causes pancreatitis    Hydrocodone bitartrate     Hydrocodone-acetaminophen Other (See Comments)    Opioids - morphine analogues      Chest pain/Okay w/Fentanyl 50 mcg - received in ED x3 over 6 hours without issues.    Oxycodone       Pancreatitis w/ all oral narctotics    Tramadol hcl      pancreatitis       Current Inpatient Medications:      Current Facility-Administered Medications on File Prior to Encounter   Medication Dose Route Frequency Provider Last Rate Last Admin    gabapentin capsule 300 mg  300 mg Oral TID Natalya Rivera NP   300 mg at 05/12/24 2132     Current Outpatient Medications on File Prior to Encounter   Medication Sig Dispense Refill    acetaminophen (TYLENOL) 650 MG TbSR Take 1 tablet (650 mg total) by mouth every 8 (eight) hours. 60 tablet 0    ALPRAZolam (XANAX) 0.5 MG tablet Take 0.5 mg by mouth 2 (two) times daily as needed for Anxiety.      chlorhexidine (PERIDEX) 0.12 % solution Use as directed 15 mLs in the mouth or throat 2 (two) times daily.      clobetasoL (CLOBEX) 0.05 % shampoo Apply topically.      diphenoxylate-atropine 2.5-0.025 mg (LOMOTIL) 2.5-0.025 mg per tablet Take 2 tablets by mouth 4 (four) times daily. (Patient not taking: Reported on 12/11/2024)      doxycycline (VIBRAMYCIN) 100 MG Cap Take 100 mg by mouth once daily.      ibuprofen (ADVIL,MOTRIN) 600 MG tablet Take 1 tablet (600 mg total) by mouth 3 (three) times daily. 60 tablet 0    loperamide (IMODIUM) 2 mg capsule Take 1 capsule (2 mg total) by mouth 4 (four) times daily as needed for Diarrhea (for large volume thin ileostomy output or <1.5L of ileostomy output). 160 capsule 2    metFORMIN (GLUCOPHAGE-XR) 500 MG ER 24hr tablet Take 500 mg by mouth 2 (two) times daily with meals.      minocycline (MINOCIN,DYNACIN) 100 MG capsule Take 100 mg by mouth once daily. (Patient not taking: Reported on 12/11/2024)      mupirocin (BACTROBAN) 2 % ointment 1 g by Nasal route 2 (two) times daily.      nitrofurantoin, macrocrystal-monohydrate, (MACROBID) 100 MG capsule Take 100 mg by mouth 2 (two) times daily.      ondansetron (ZOFRAN) 4 MG tablet Take 1 tablet (4 mg total) by mouth every 6 (six) hours as needed for Nausea. 15 tablet 0    propranoloL  (INDERAL) 10 MG tablet Take 10 mg by mouth 3 (three) times daily as needed (headache and pain). Therapy not started      sulfacetamide sodium 10 % Clsr SMARTSIG:Topical Every Evening      TOPROL XL 25 mg 24 hr tablet Take 25 mg by mouth. 50 mg      tretinoin (RETIN-A) 0.025 % cream Apply topically nightly.      triamcinolone acetonide 0.1% (KENALOG) 0.1 % cream Apply topically 2 (two) times daily.      VAGIFEM 10 mcg Tab Place vaginally as needed (twice monthly (no set dates)).         Past Surgical History:   Procedure Laterality Date    CHOLECYSTECTOMY      COLONOSCOPY      CYSTOSCOPY W/ URETERAL STENT PLACEMENT Bilateral 5/9/2024    Procedure: CYSTOSCOPY, WITH URETERAL STENT INSERTION;  Surgeon: Cas Bang MD;  [facility];  Service: Urology;  Laterality: Bilateral;    EXPLORATORY LAPAROTOMY      FLEXIBLE SIGMOIDOSCOPY N/A 5/9/2024    Procedure: SIGMOIDOSCOPY, FLEXIBLE;  Surgeon: SANTIAGO Paris MD;  [facility];  Service: Colon and Rectal;  Laterality: N/A;    ILEOSTOMY CLOSURE N/A 8/29/2024    Procedure: CLOSURE, ILEOSTOMY;  Surgeon: SANTIAGO Paris MD;  Location: [facility] OR;  Service: Colon and Rectal;  Laterality: N/A;    LOW ANTERIOR RESECTION OF COLON N/A 5/9/2024    Procedure: RESECTION, COLON, LOW ANTERIOR, robotic converted to open;  Surgeon: SANTIAGO Paris MD;  [facility];  Service: Colon and Rectal;  Laterality: N/A;    LYSIS OF ADHESIONS  5/9/2024    Procedure: LYSIS, ADHESIONS;  Surgeon: SANTIAGO Paris MD;  [facility];  Service: Colon and Rectal;;  extensive, greater  than 2 hours; 22 modifier    MOBILIZATION, SPLENIC FLEXURE, LAPAROSCOPIC  5/9/2024    Procedure: MOBILIZATION, SPLENIC FLEXURE, LAPAROSCOPIC;  Surgeon: SANTIAGO Paris MD;  [facility];  Service: Colon and Rectal;;  22 modifier; greater than 2 hours    OVARY SURGERY      relocation    ROBOT-ASSISTED LAPAROSCOPIC RESECTION OF SIGMOID COLON USING DA  ESTEFANI XI N/A 5/9/2024    Procedure: XI ROBOTIC COLECTOMY, SIGMOID;  Surgeon: SANTIAGO Paris MD;  Location: [facility] OR [address] [place] [address];  Service: Colon and Rectal;  Laterality: N/A;  converted to open    thryoid nodule         Social History     Socioeconomic History    Marital status:    Tobacco Use    Smoking status: Never    Smokeless tobacco: Never   Substance and Sexual Activity    Alcohol use: Not Currently     Comment: once a month    Drug use: Never    Sexual activity: Yes     Partners: Male     Birth control/protection: See Surgical Hx     Social Drivers of Health     Financial Resource Strain: Low Risk  (5/16/2024)    Overall Financial Resource Strain (CARDIA)     Difficulty of Paying Living Expenses: Not hard at all   Food Insecurity: No Food Insecurity (9/4/2024)    Hunger Vital Sign     Worried About Running Out of Food in the Last Year: Never true     Ran Out of Food in the Last Year: Never true   Transportation Needs: No Transportation Needs (9/4/2024)    TRANSPORTATION NEEDS     Transportation : No   Physical Activity: Insufficiently Active (5/16/2024)    Exercise Vital Sign     Days of Exercise per Week: 3 days     Minutes of Exercise per Session: 30 min   Stress: No Stress Concern Present (5/16/2024)    [employer] - Occupational Stress Questionnaire     Feeling of Stress : Not at all   Housing Stability: Low Risk  (9/4/2024)    Housing Stability Vital Sign     Unable to Pay for Housing in the Last Year: No     Homeless in the Last Year: No       OBJECTIVE:     Vital Signs Range (Last 24H):         Significant Labs:  Lab Results   Component Value Date    WBC 4.46 09/06/2024    HGB 7.8 (L) 09/06/2024    HCT 23.8 (L) 09/06/2024     09/06/2024    ALT 16 09/16/2024    AST 30 09/16/2024     (H) 09/16/2024    K 3.9 09/16/2024     09/16/2024    CREATININE 1.0 09/16/2024    BUN 12 09/16/2024    CO2 25 09/16/2024    INR 0.9 06/10/2024       Diagnostic Studies:  No relevant studies.    EKG:   No results found for this or any previous visit.    2D ECHO:  TTE:  No results found for this or any previous visit.    EREN:  No results found for this or any previous visit.    ASSESSMENT/PLAN:         Pre-op Assessment    I have reviewed the Patient Summary Reports.     I have reviewed the Nursing Notes. I have reviewed the NPO Status.   I have reviewed the Medications.     Review of Systems  Anesthesia Hx:  No problems with previous Anesthesia   History of prior surgery of interest to airway management or planning:          Denies Family Hx of Anesthesia complications.    Denies Personal Hx of Anesthesia complications.                    Social:  Non-Smoker, No Alcohol Use       Hematology/Oncology:                      Current/Recent Cancer.                EENT/Dental:  denies chronic allergic rhinitis           Cardiovascular:      Denies Hypertension.    Denies CAD.           no hyperlipidemia                               Pulmonary:    Denies COPD.  Denies Asthma.     Denies Sleep Apnea.            Pulmonary Infection:  Pneumonia.     Renal/:   Denies Chronic Renal Disease.                Hepatic/GI:      Denies GERD. Liver Disease,            Liver Disease        Musculoskeletal:  Denies Arthritis.               Neurological:    Denies CVA.    Denies Seizures.                              Neuromuscular Disease   Psych:  Denies Psychiatric History.                  Physical Exam  General: Well nourished, Cooperative, Alert and Oriented    Airway:  Mallampati: II   Mouth Opening: Normal  TM Distance: Normal  Tongue: Normal  Neck ROM: Normal ROM    Dental:  Intact        Anesthesia Plan  Type of Anesthesia, risks & benefits discussed:    Anesthesia Type: Gen ETT  Intra-op Monitoring Plan: Standard ASA Monitors and Art Line  Post Op Pain Control Plan: multimodal analgesia  Induction:  IV  Airway Plan: Direct, Post-Induction  Informed Consent: Informed consent signed with the  Patient and all parties understand the risks and agree with anesthesia plan.  All questions answered.   ASA Score: 3  Day of Surgery Review of History & Physical: H&P Update referred to the surgeon/provider.    Ready For Surgery From Anesthesia Perspective.     .

## 2024-12-12 ENCOUNTER — HOSPITAL ENCOUNTER (INPATIENT)
Facility: HOSPITAL | Age: 49
LOS: 3 days | Discharge: HOME | DRG: 407 | End: 2024-12-15
Attending: SURGERY | Admitting: SURGERY
Payer: COMMERCIAL

## 2024-12-12 ENCOUNTER — ANESTHESIA (OUTPATIENT)
Dept: SURGERY | Facility: HOSPITAL | Age: 49
End: 2024-12-12
Payer: COMMERCIAL

## 2024-12-12 DIAGNOSIS — C78.7 METASTASES TO THE LIVER: ICD-10-CM

## 2024-12-12 DIAGNOSIS — R00.0 SINUS TACHYCARDIA: ICD-10-CM

## 2024-12-12 DIAGNOSIS — C18.9 METASTATIC COLON CANCER TO LIVER: Primary | ICD-10-CM

## 2024-12-12 DIAGNOSIS — C78.7 METASTATIC COLON CANCER TO LIVER: Primary | ICD-10-CM

## 2024-12-12 LAB
ABO + RH BLD: NORMAL
ALBUMIN SERPL BCP-MCNC: 2.6 G/DL (ref 3.5–5.2)
ALP SERPL-CCNC: 116 U/L (ref 40–150)
ALT SERPL W/O P-5'-P-CCNC: 261 U/L (ref 10–44)
ANION GAP SERPL CALC-SCNC: 11 MMOL/L (ref 8–16)
AST SERPL-CCNC: 562 U/L (ref 10–40)
BILIRUB SERPL-MCNC: 0.6 MG/DL (ref 0.1–1)
BLD GP AB SCN CELLS X3 SERPL QL: NORMAL
BUN SERPL-MCNC: 11 MG/DL (ref 6–20)
CALCIUM SERPL-MCNC: 7.6 MG/DL (ref 8.7–10.5)
CHLORIDE SERPL-SCNC: 110 MMOL/L (ref 95–110)
CO2 SERPL-SCNC: 18 MMOL/L (ref 23–29)
CREAT SERPL-MCNC: 0.9 MG/DL (ref 0.5–1.4)
ERYTHROCYTE [DISTWIDTH] IN BLOOD BY AUTOMATED COUNT: 13.7 % (ref 11.5–14.5)
EST. GFR  (NO RACE VARIABLE): >60 ML/MIN/1.73 M^2
GLUCOSE SERPL-MCNC: 181 MG/DL (ref 70–110)
HCT VFR BLD AUTO: 29 % (ref 37–48.5)
HGB BLD-MCNC: 9.2 G/DL (ref 12–16)
MCH RBC QN AUTO: 30.8 PG (ref 27–31)
MCHC RBC AUTO-ENTMCNC: 31.7 G/DL (ref 32–36)
MCV RBC AUTO: 97 FL (ref 82–98)
PHOSPHATE SERPL-MCNC: 3.1 MG/DL (ref 2.7–4.5)
PLATELET # BLD AUTO: 144 K/UL (ref 150–450)
PMV BLD AUTO: 10.6 FL (ref 9.2–12.9)
POCT GLUCOSE: 192 MG/DL (ref 70–110)
POTASSIUM SERPL-SCNC: 3.4 MMOL/L (ref 3.5–5.1)
PROT SERPL-MCNC: 5.1 G/DL (ref 6–8.4)
RBC # BLD AUTO: 2.99 M/UL (ref 4–5.4)
SODIUM SERPL-SCNC: 139 MMOL/L (ref 136–145)
WBC # BLD AUTO: 7.62 K/UL (ref 3.9–12.7)

## 2024-12-12 PROCEDURE — 88342 IMHCHEM/IMCYTCHM 1ST ANTB: CPT | Performed by: PATHOLOGY

## 2024-12-12 PROCEDURE — 71000039 HC RECOVERY, EACH ADD'L HOUR: Performed by: SURGERY

## 2024-12-12 PROCEDURE — 63600175 PHARM REV CODE 636 W HCPCS: Performed by: STUDENT IN AN ORGANIZED HEALTH CARE EDUCATION/TRAINING PROGRAM

## 2024-12-12 PROCEDURE — 64461 PVB THORACIC SINGLE INJ SITE: CPT

## 2024-12-12 PROCEDURE — 36000709 HC OR TIME LEV III EA ADD 15 MIN: Performed by: SURGERY

## 2024-12-12 PROCEDURE — 86901 BLOOD TYPING SEROLOGIC RH(D): CPT

## 2024-12-12 PROCEDURE — 36000708 HC OR TIME LEV III 1ST 15 MIN: Performed by: SURGERY

## 2024-12-12 PROCEDURE — 82962 GLUCOSE BLOOD TEST: CPT | Performed by: SURGERY

## 2024-12-12 PROCEDURE — 0FB20ZZ EXCISION OF LEFT LOBE LIVER, OPEN APPROACH: ICD-10-PCS | Performed by: SURGERY

## 2024-12-12 PROCEDURE — 63600175 PHARM REV CODE 636 W HCPCS

## 2024-12-12 PROCEDURE — 84100 ASSAY OF PHOSPHORUS: CPT | Performed by: STUDENT IN AN ORGANIZED HEALTH CARE EDUCATION/TRAINING PROGRAM

## 2024-12-12 PROCEDURE — 27201037 HC PRESSURE MONITORING SET UP

## 2024-12-12 PROCEDURE — 37000008 HC ANESTHESIA 1ST 15 MINUTES: Performed by: SURGERY

## 2024-12-12 PROCEDURE — 88307 TISSUE EXAM BY PATHOLOGIST: CPT | Mod: 59 | Performed by: PATHOLOGY

## 2024-12-12 PROCEDURE — 94640 AIRWAY INHALATION TREATMENT: CPT

## 2024-12-12 PROCEDURE — 63600175 PHARM REV CODE 636 W HCPCS: Performed by: SURGERY

## 2024-12-12 PROCEDURE — 76998 US GUIDE INTRAOP: CPT | Mod: 26,,, | Performed by: SURGERY

## 2024-12-12 PROCEDURE — 85027 COMPLETE CBC AUTOMATED: CPT | Performed by: STUDENT IN AN ORGANIZED HEALTH CARE EDUCATION/TRAINING PROGRAM

## 2024-12-12 PROCEDURE — 25000003 PHARM REV CODE 250: Performed by: STUDENT IN AN ORGANIZED HEALTH CARE EDUCATION/TRAINING PROGRAM

## 2024-12-12 PROCEDURE — 86920 COMPATIBILITY TEST SPIN: CPT | Performed by: SURGERY

## 2024-12-12 PROCEDURE — 25000003 PHARM REV CODE 250

## 2024-12-12 PROCEDURE — 27201423 OPTIME MED/SURG SUP & DEVICES STERILE SUPPLY: Performed by: SURGERY

## 2024-12-12 PROCEDURE — 37000009 HC ANESTHESIA EA ADD 15 MINS: Performed by: SURGERY

## 2024-12-12 PROCEDURE — 80053 COMPREHEN METABOLIC PANEL: CPT | Performed by: STUDENT IN AN ORGANIZED HEALTH CARE EDUCATION/TRAINING PROGRAM

## 2024-12-12 PROCEDURE — 94761 N-INVAS EAR/PLS OXIMETRY MLT: CPT

## 2024-12-12 PROCEDURE — 63600175 PHARM REV CODE 636 W HCPCS: Mod: JZ,JG | Performed by: ANESTHESIOLOGY

## 2024-12-12 PROCEDURE — 94664 DEMO&/EVAL PT USE INHALER: CPT

## 2024-12-12 PROCEDURE — 0FB10ZZ EXCISION OF RIGHT LOBE LIVER, OPEN APPROACH: ICD-10-PCS | Performed by: SURGERY

## 2024-12-12 PROCEDURE — 0F500ZZ DESTRUCTION OF LIVER, OPEN APPROACH: ICD-10-PCS | Performed by: SURGERY

## 2024-12-12 PROCEDURE — 20600001 HC STEP DOWN PRIVATE ROOM

## 2024-12-12 PROCEDURE — 27000646 HC AEROBIKA DEVICE

## 2024-12-12 PROCEDURE — 88341 IMHCHEM/IMCYTCHM EA ADD ANTB: CPT | Mod: 59 | Performed by: PATHOLOGY

## 2024-12-12 PROCEDURE — 25000242 PHARM REV CODE 250 ALT 637 W/ HCPCS: Performed by: STUDENT IN AN ORGANIZED HEALTH CARE EDUCATION/TRAINING PROGRAM

## 2024-12-12 PROCEDURE — 99900035 HC TECH TIME PER 15 MIN (STAT)

## 2024-12-12 PROCEDURE — 47399 UNLISTED PROCEDURE LIVER: CPT | Mod: ,,, | Performed by: SURGERY

## 2024-12-12 PROCEDURE — 88360 TUMOR IMMUNOHISTOCHEM/MANUAL: CPT | Performed by: PATHOLOGY

## 2024-12-12 PROCEDURE — 71000033 HC RECOVERY, INTIAL HOUR: Performed by: SURGERY

## 2024-12-12 RX ORDER — SODIUM CHLORIDE 9 MG/ML
INJECTION, SOLUTION INTRAVENOUS CONTINUOUS
Status: DISCONTINUED | OUTPATIENT
Start: 2024-12-12 | End: 2024-12-12

## 2024-12-12 RX ORDER — PROPOFOL 10 MG/ML
VIAL (ML) INTRAVENOUS
Status: DISCONTINUED | OUTPATIENT
Start: 2024-12-12 | End: 2024-12-12

## 2024-12-12 RX ORDER — HEPARIN SODIUM 5000 [USP'U]/ML
5000 INJECTION, SOLUTION INTRAVENOUS; SUBCUTANEOUS
Status: DISCONTINUED | OUTPATIENT
Start: 2024-12-12 | End: 2024-12-12

## 2024-12-12 RX ORDER — NALOXONE HCL 0.4 MG/ML
0.02 VIAL (ML) INJECTION
Status: DISCONTINUED | OUTPATIENT
Start: 2024-12-12 | End: 2024-12-14

## 2024-12-12 RX ORDER — CEFAZOLIN SODIUM 1 G/3ML
INJECTION, POWDER, FOR SOLUTION INTRAMUSCULAR; INTRAVENOUS
Status: DISCONTINUED | OUTPATIENT
Start: 2024-12-12 | End: 2024-12-12

## 2024-12-12 RX ORDER — ROCURONIUM BROMIDE 10 MG/ML
INJECTION, SOLUTION INTRAVENOUS
Status: DISCONTINUED | OUTPATIENT
Start: 2024-12-12 | End: 2024-12-12

## 2024-12-12 RX ORDER — HYDROCODONE BITARTRATE AND ACETAMINOPHEN 500; 5 MG/1; MG/1
TABLET ORAL
Status: DISCONTINUED | OUTPATIENT
Start: 2024-12-12 | End: 2024-12-12

## 2024-12-12 RX ORDER — KETAMINE HCL IN 0.9 % NACL 50 MG/5 ML
SYRINGE (ML) INTRAVENOUS
Status: DISCONTINUED | OUTPATIENT
Start: 2024-12-12 | End: 2024-12-12

## 2024-12-12 RX ORDER — DEXAMETHASONE SODIUM PHOSPHATE 4 MG/ML
INJECTION, SOLUTION INTRA-ARTICULAR; INTRALESIONAL; INTRAMUSCULAR; INTRAVENOUS; SOFT TISSUE
Status: DISCONTINUED | OUTPATIENT
Start: 2024-12-12 | End: 2024-12-12

## 2024-12-12 RX ORDER — MIDAZOLAM HYDROCHLORIDE 1 MG/ML
INJECTION INTRAMUSCULAR; INTRAVENOUS
Status: DISCONTINUED | OUTPATIENT
Start: 2024-12-12 | End: 2024-12-12

## 2024-12-12 RX ORDER — METRONIDAZOLE 500 MG/100ML
500 INJECTION, SOLUTION INTRAVENOUS
Status: DISCONTINUED | OUTPATIENT
Start: 2024-12-12 | End: 2024-12-12

## 2024-12-12 RX ORDER — CEFAZOLIN 2 G/1
2 INJECTION, POWDER, FOR SOLUTION INTRAMUSCULAR; INTRAVENOUS
Status: DISCONTINUED | OUTPATIENT
Start: 2024-12-12 | End: 2024-12-12

## 2024-12-12 RX ORDER — LIDOCAINE HYDROCHLORIDE 10 MG/ML
INJECTION, SOLUTION EPIDURAL; INFILTRATION; INTRACAUDAL; PERINEURAL
Status: DISPENSED
Start: 2024-12-12 | End: 2024-12-12

## 2024-12-12 RX ORDER — FENTANYL CITRATE 50 UG/ML
INJECTION, SOLUTION INTRAMUSCULAR; INTRAVENOUS
Status: DISCONTINUED | OUTPATIENT
Start: 2024-12-12 | End: 2024-12-12

## 2024-12-12 RX ORDER — PHENYLEPHRINE HYDROCHLORIDE 10 MG/ML
INJECTION INTRAVENOUS
Status: DISCONTINUED | OUTPATIENT
Start: 2024-12-12 | End: 2024-12-12

## 2024-12-12 RX ORDER — CELECOXIB 200 MG/1
400 CAPSULE ORAL
Status: DISCONTINUED | OUTPATIENT
Start: 2024-12-12 | End: 2024-12-12

## 2024-12-12 RX ORDER — LIDOCAINE HYDROCHLORIDE 20 MG/ML
INJECTION, SOLUTION EPIDURAL; INFILTRATION; INTRACAUDAL; PERINEURAL
Status: DISCONTINUED | OUTPATIENT
Start: 2024-12-12 | End: 2024-12-12

## 2024-12-12 RX ORDER — FENTANYL CITRATE 50 UG/ML
25 INJECTION, SOLUTION INTRAMUSCULAR; INTRAVENOUS EVERY 5 MIN PRN
Status: DISCONTINUED | OUTPATIENT
Start: 2024-12-12 | End: 2024-12-12 | Stop reason: HOSPADM

## 2024-12-12 RX ORDER — GLUCAGON 1 MG
1 KIT INJECTION
Status: DISCONTINUED | OUTPATIENT
Start: 2024-12-12 | End: 2024-12-12 | Stop reason: HOSPADM

## 2024-12-12 RX ORDER — ONDANSETRON HYDROCHLORIDE 2 MG/ML
4 INJECTION, SOLUTION INTRAVENOUS EVERY 12 HOURS PRN
Status: DISCONTINUED | OUTPATIENT
Start: 2024-12-12 | End: 2024-12-15 | Stop reason: HOSPADM

## 2024-12-12 RX ORDER — PANTOPRAZOLE SODIUM 40 MG/10ML
40 INJECTION, POWDER, LYOPHILIZED, FOR SOLUTION INTRAVENOUS DAILY
Status: DISCONTINUED | OUTPATIENT
Start: 2024-12-12 | End: 2024-12-13

## 2024-12-12 RX ORDER — DEXMEDETOMIDINE HYDROCHLORIDE 100 UG/ML
INJECTION, SOLUTION INTRAVENOUS
Status: DISCONTINUED | OUTPATIENT
Start: 2024-12-12 | End: 2024-12-12

## 2024-12-12 RX ORDER — ACETAMINOPHEN 500 MG
1000 TABLET ORAL
Status: DISCONTINUED | OUTPATIENT
Start: 2024-12-12 | End: 2024-12-12

## 2024-12-12 RX ORDER — ONDANSETRON HYDROCHLORIDE 2 MG/ML
INJECTION, SOLUTION INTRAVENOUS
Status: DISCONTINUED | OUTPATIENT
Start: 2024-12-12 | End: 2024-12-12

## 2024-12-12 RX ORDER — ACETAMINOPHEN 10 MG/ML
1000 INJECTION, SOLUTION INTRAVENOUS EVERY 8 HOURS
Status: DISPENSED | OUTPATIENT
Start: 2024-12-12 | End: 2024-12-13

## 2024-12-12 RX ORDER — HYDROMORPHONE HCL IN 0.9% NACL 6 MG/30 ML
PATIENT CONTROLLED ANALGESIA SYRINGE INTRAVENOUS CONTINUOUS
Status: DISCONTINUED | OUTPATIENT
Start: 2024-12-12 | End: 2024-12-14

## 2024-12-12 RX ORDER — METHOCARBAMOL 100 MG/ML
500 INJECTION, SOLUTION INTRAMUSCULAR; INTRAVENOUS EVERY 8 HOURS
Status: DISCONTINUED | OUTPATIENT
Start: 2024-12-12 | End: 2024-12-13

## 2024-12-12 RX ORDER — HEPARIN SODIUM 5000 [USP'U]/ML
5000 INJECTION, SOLUTION INTRAVENOUS; SUBCUTANEOUS EVERY 8 HOURS
Status: DISCONTINUED | OUTPATIENT
Start: 2024-12-12 | End: 2024-12-13

## 2024-12-12 RX ORDER — BUPIVACAINE HYDROCHLORIDE 7.5 MG/ML
INJECTION, SOLUTION EPIDURAL; RETROBULBAR
Status: COMPLETED | OUTPATIENT
Start: 2024-12-12 | End: 2024-12-12

## 2024-12-12 RX ORDER — SODIUM CHLORIDE 0.9 % (FLUSH) 0.9 %
10 SYRINGE (ML) INJECTION
Status: DISCONTINUED | OUTPATIENT
Start: 2024-12-12 | End: 2024-12-12 | Stop reason: HOSPADM

## 2024-12-12 RX ORDER — PROCHLORPERAZINE EDISYLATE 5 MG/ML
5 INJECTION INTRAMUSCULAR; INTRAVENOUS EVERY 6 HOURS PRN
Status: DISCONTINUED | OUTPATIENT
Start: 2024-12-12 | End: 2024-12-15 | Stop reason: HOSPADM

## 2024-12-12 RX ORDER — EPHEDRINE SULFATE 50 MG/ML
INJECTION, SOLUTION INTRAVENOUS
Status: DISCONTINUED | OUTPATIENT
Start: 2024-12-12 | End: 2024-12-12

## 2024-12-12 RX ORDER — DEXTROSE MONOHYDRATE, SODIUM CHLORIDE, AND POTASSIUM CHLORIDE 50; 1.49; 4.5 G/1000ML; G/1000ML; G/1000ML
INJECTION, SOLUTION INTRAVENOUS CONTINUOUS
Status: DISCONTINUED | OUTPATIENT
Start: 2024-12-12 | End: 2024-12-14

## 2024-12-12 RX ORDER — MUPIROCIN 20 MG/G
1 OINTMENT TOPICAL 2 TIMES DAILY
Status: DISCONTINUED | OUTPATIENT
Start: 2024-12-12 | End: 2024-12-15 | Stop reason: HOSPADM

## 2024-12-12 RX ORDER — LEVALBUTEROL INHALATION SOLUTION 0.63 MG/3ML
0.63 SOLUTION RESPIRATORY (INHALATION)
Status: COMPLETED | OUTPATIENT
Start: 2024-12-12 | End: 2024-12-14

## 2024-12-12 RX ORDER — ACETAMINOPHEN 500 MG
1000 TABLET ORAL
Status: DISCONTINUED | OUTPATIENT
Start: 2024-12-12 | End: 2024-12-12 | Stop reason: HOSPADM

## 2024-12-12 RX ADMIN — MUPIROCIN 1 G: 20 OINTMENT TOPICAL at 09:12

## 2024-12-12 RX ADMIN — LIDOCAINE HYDROCHLORIDE 100 MG: 20 INJECTION, SOLUTION EPIDURAL; INFILTRATION; INTRACAUDAL; PERINEURAL at 07:12

## 2024-12-12 RX ADMIN — SODIUM CHLORIDE, SODIUM GLUCONATE, SODIUM ACETATE, POTASSIUM CHLORIDE, MAGNESIUM CHLORIDE, SODIUM PHOSPHATE, DIBASIC, AND POTASSIUM PHOSPHATE: .53; .5; .37; .037; .03; .012; .00082 INJECTION, SOLUTION INTRAVENOUS at 07:12

## 2024-12-12 RX ADMIN — SODIUM CHLORIDE: 9 INJECTION, SOLUTION INTRAVENOUS at 12:12

## 2024-12-12 RX ADMIN — CELECOXIB 400 MG: 200 CAPSULE ORAL at 06:12

## 2024-12-12 RX ADMIN — EPHEDRINE SULFATE 5 MG: 50 INJECTION INTRAVENOUS at 10:12

## 2024-12-12 RX ADMIN — FENTANYL CITRATE 50 MCG: 50 INJECTION INTRAMUSCULAR; INTRAVENOUS at 12:12

## 2024-12-12 RX ADMIN — PHENYLEPHRINE HYDROCHLORIDE 100 MCG: 10 INJECTION INTRAVENOUS at 10:12

## 2024-12-12 RX ADMIN — PHENYLEPHRINE HYDROCHLORIDE 100 MCG: 10 INJECTION INTRAVENOUS at 07:12

## 2024-12-12 RX ADMIN — METHOCARBAMOL 500 MG: 100 INJECTION INTRAMUSCULAR; INTRAVENOUS at 10:12

## 2024-12-12 RX ADMIN — FENTANYL CITRATE 50 MCG: 50 INJECTION INTRAMUSCULAR; INTRAVENOUS at 07:12

## 2024-12-12 RX ADMIN — ROCURONIUM BROMIDE 50 MG: 10 INJECTION, SOLUTION INTRAVENOUS at 07:12

## 2024-12-12 RX ADMIN — Medication: at 12:12

## 2024-12-12 RX ADMIN — ONDANSETRON 4 MG: 2 INJECTION INTRAMUSCULAR; INTRAVENOUS at 02:12

## 2024-12-12 RX ADMIN — ROCURONIUM BROMIDE 10 MG: 10 INJECTION, SOLUTION INTRAVENOUS at 11:12

## 2024-12-12 RX ADMIN — PANTOPRAZOLE SODIUM 40 MG: 40 INJECTION, POWDER, FOR SOLUTION INTRAVENOUS at 12:12

## 2024-12-12 RX ADMIN — Medication 25 MG: at 07:12

## 2024-12-12 RX ADMIN — LEVALBUTEROL HYDROCHLORIDE 0.63 MG: 0.63 SOLUTION RESPIRATORY (INHALATION) at 07:12

## 2024-12-12 RX ADMIN — ACETAMINOPHEN 1000 MG: 10 INJECTION, SOLUTION INTRAVENOUS at 09:12

## 2024-12-12 RX ADMIN — ONDANSETRON 4 MG: 2 INJECTION INTRAMUSCULAR; INTRAVENOUS at 11:12

## 2024-12-12 RX ADMIN — METRONIDAZOLE 500 MG: 500 INJECTION, SOLUTION INTRAVENOUS at 07:12

## 2024-12-12 RX ADMIN — PROPOFOL 130 MG: 10 INJECTION, EMULSION INTRAVENOUS at 07:12

## 2024-12-12 RX ADMIN — SUGAMMADEX 200 MG: 100 INJECTION, SOLUTION INTRAVENOUS at 11:12

## 2024-12-12 RX ADMIN — PHENYLEPHRINE HYDROCHLORIDE 100 MCG: 10 INJECTION INTRAVENOUS at 08:12

## 2024-12-12 RX ADMIN — FENTANYL CITRATE 25 MCG: 50 INJECTION, SOLUTION INTRAMUSCULAR; INTRAVENOUS at 12:12

## 2024-12-12 RX ADMIN — Medication 5 MG: at 08:12

## 2024-12-12 RX ADMIN — Medication 10 MG: at 10:12

## 2024-12-12 RX ADMIN — CEFAZOLIN 2 G: 330 INJECTION, POWDER, FOR SOLUTION INTRAMUSCULAR; INTRAVENOUS at 11:12

## 2024-12-12 RX ADMIN — DEXMEDETOMIDINE 8 MCG: 200 INJECTION, SOLUTION INTRAVENOUS at 08:12

## 2024-12-12 RX ADMIN — Medication 10 MG: at 09:12

## 2024-12-12 RX ADMIN — EPHEDRINE SULFATE 5 MG: 50 INJECTION INTRAVENOUS at 09:12

## 2024-12-12 RX ADMIN — MIDAZOLAM HYDROCHLORIDE 1 MG: 1 INJECTION, SOLUTION INTRAMUSCULAR; INTRAVENOUS at 07:12

## 2024-12-12 RX ADMIN — DEXMEDETOMIDINE 8 MCG: 200 INJECTION, SOLUTION INTRAVENOUS at 12:12

## 2024-12-12 RX ADMIN — ROCURONIUM BROMIDE 50 MG: 10 INJECTION, SOLUTION INTRAVENOUS at 08:12

## 2024-12-12 RX ADMIN — CEFAZOLIN 2 G: 330 INJECTION, POWDER, FOR SOLUTION INTRAMUSCULAR; INTRAVENOUS at 07:12

## 2024-12-12 RX ADMIN — FENTANYL CITRATE 50 MCG: 50 INJECTION INTRAMUSCULAR; INTRAVENOUS at 11:12

## 2024-12-12 RX ADMIN — PHENYLEPHRINE HYDROCHLORIDE 100 MCG: 10 INJECTION INTRAVENOUS at 09:12

## 2024-12-12 RX ADMIN — HEPARIN SODIUM 5000 UNITS: 5000 INJECTION INTRAVENOUS; SUBCUTANEOUS at 09:12

## 2024-12-12 RX ADMIN — FENTANYL CITRATE 50 MCG: 50 INJECTION INTRAMUSCULAR; INTRAVENOUS at 08:12

## 2024-12-12 RX ADMIN — PHENYLEPHRINE HYDROCHLORIDE 100 MCG: 10 INJECTION INTRAVENOUS at 11:12

## 2024-12-12 RX ADMIN — HEPARIN SODIUM 5000 UNITS: 5000 INJECTION, SOLUTION INTRAVENOUS; SUBCUTANEOUS at 06:12

## 2024-12-12 RX ADMIN — POTASSIUM CHLORIDE: 2 INJECTION, SOLUTION, CONCENTRATE INTRAVENOUS at 05:12

## 2024-12-12 RX ADMIN — BUPIVACAINE HYDROCHLORIDE 30 ML: 7.5 INJECTION, SOLUTION EPIDURAL; RETROBULBAR at 07:12

## 2024-12-12 RX ADMIN — ACETAMINOPHEN 1000 MG: 10 INJECTION, SOLUTION INTRAVENOUS at 01:12

## 2024-12-12 RX ADMIN — ACETAMINOPHEN 1000 MG: 500 TABLET ORAL at 06:12

## 2024-12-12 RX ADMIN — ONDANSETRON 4 MG: 2 INJECTION INTRAMUSCULAR; INTRAVENOUS at 08:12

## 2024-12-12 RX ADMIN — DEXMEDETOMIDINE 8 MCG: 200 INJECTION, SOLUTION INTRAVENOUS at 11:12

## 2024-12-12 RX ADMIN — HEPARIN SODIUM 5000 UNITS: 5000 INJECTION INTRAVENOUS; SUBCUTANEOUS at 01:12

## 2024-12-12 RX ADMIN — DEXAMETHASONE SODIUM PHOSPHATE 4 MG: 4 INJECTION, SOLUTION INTRAMUSCULAR; INTRAVENOUS at 07:12

## 2024-12-12 NOTE — ANESTHESIA PROCEDURE NOTES
Intubation    Date/Time: 12/12/2024 7:12 AM    Performed by: Sade Woody MD  Authorized by: Jai Keyes MD    Intubation:     Induction:  Intravenous    Intubated:  Postinduction    Mask Ventilation:  Easy mask    Attempts:  1    Attempted By:  Other (see comments) (Nephrology fellow)    Method of Intubation:  Direct    Blade:  Vincent 3    Laryngeal View Grade: Grade I - full view of cords      Difficult Airway Encountered?: No      Complications:  None    Airway Device:  Oral endotracheal tube    Airway Device Size:  7.0    Style/Cuff Inflation:  Cuffed (inflated to minimal occlusive pressure)    Inflation Amount (mL):  8    Tube secured:  21    Secured at:  The teeth    Placement Verified By:  Capnometry    Complicating Factors:  None    Findings Post-Intubation:  BS equal bilateral

## 2024-12-12 NOTE — BRIEF OP NOTE
[facility] - Surgery ([facility])  Brief Operative Note    SUMMARY     Surgery Date: 12/12/2024     Surgeons and Role:     * Kimo Yuan MD - Primary     * Ming Sepulveda MD - Resident - Chief     * Cruz Mandel MD - Consult    Assisting Surgeon: None    Pre-op Diagnosis:  Metastatic colon cancer to liver [C18.9, C78.7]    Post-op Diagnosis:  Post-Op Diagnosis Codes:     * Metastatic colon cancer to liver [C18.9, C78.7]    Procedure(s) (LRB):  EXCISION, LIVER (N/A)    Anesthesia: General    Implants:  * No implants in log *    Operative Findings: Non-anatomic partial hepatectomies x 5. Please see formal operative report for details.     Estimated Blood Loss: 125mL    Estimated Blood Loss has been documented.         Specimens:   Specimen (24h ago, onward)       Start     Ordered    12/12/24 1155  Specimen to Pathology, Surgery General Surgery  Once        Comments: Pre-op Diagnosis: Metastatic colon cancer to liver [C18.9, C78.7]Procedure(s):EXCISION, LIVER Number of specimens: 5Name of specimens: 1) Segment 8 Liver Resection (Permanent)2) Segment 7 Liver - 2 lesions (Permanent)3) Segment 4B-5 Liver Tumors (Permanent)4) Segment 7 Liver Lesion (permanent)5) Segment 4A Liver Lesion (permanent)     References:    Click here for ordering Quick Tip   Question Answer Comment   Procedure Type: General Surgery    Specimen Class: Known or suspected malignancy    Release to patient Immediate        12/12/24 7927                    [id number]

## 2024-12-12 NOTE — NURSING TRANSFER
Nursing Transfer Note      12/12/2024   1:25 PM    Nurse giving handoff:Star MUJICA RN  Nurse receiving handoff:Brittany [employer]    Reason patient is being transferred: meets criteria    Transfer To: [id number]    Transfer via bed    Transfer with cardiac monitoring    Transported by RN, transport    Transfer Vital Signs:  Blood Pressure:103/71  Heart Rate:82  O2:98  Temperature:97.7  Respirations:12    Telemetry: Rhythm sr  Order for Tele Monitor? Yes    Additional Lines: Gunn Catheter    Medicines sent: PCA pump hydromorphone    Any special needs or follow-up needed: pain control    Patient belongings transferred with patient: No    Chart send with patient: Yes    Notified: spouse    Patient reassessed at: 12/12

## 2024-12-12 NOTE — PLAN OF CARE
Pt IV flushed. Pt reports pain relief with use of pain pump. Abdominal incision CDI. Gunn in place and draining.

## 2024-12-12 NOTE — ANESTHESIA PROCEDURE NOTES
Arterial    Diagnosis: Metastatic Colorectal Carcinoma to the Liver    Patient location during procedure: done in OR  Timeout: 12/12/2024 7:28 AM  Procedure end time: 12/12/2024 7:31 AM    Staffing  Authorizing Provider: Jai Keyes MD  Performing Provider: Sade Woody MD    Staffing  Performed by: Sade Woody MD  Authorized by: Jai Keyes MD    Anesthesiologist was present at the time of the procedure.    Preanesthetic Checklist  Completed: patient identified, IV checked, site marked, risks and benefits discussed, surgical consent, monitors and equipment checked, pre-op evaluation, timeout performed and anesthesia consent givenArterial  Skin Prep: chlorhexidine gluconate  Orientation: left  Location: radial    Catheter Size: 20 G  Catheter placement by Ultrasound guidance. Heme positive aspiration all ports.   Vessel Caliber: patent  Needle advanced into vessel with real time Ultrasound guidance.Insertion Attempts: 1  Assessment  Dressing: secured with tape and tegaderm

## 2024-12-12 NOTE — ANESTHESIA PROCEDURE NOTES
Bilateral ENEDINA SS    Patient location during procedure: pre-op   Block not for primary anesthetic.  Reason for block: at surgeon's request and post-op pain management   Post-op Pain Location: bilateral abdominal pain   Start time: 12/12/2024 6:58 AM  Timeout: 12/12/2024 6:57 AM   End time: 12/12/2024 7:02 AM    Staffing  Authorizing Provider: Sonu Kessler MD  Performing Provider: Ronnie Mcgraw MD    Staffing  Performed by: Ronnie Mcgraw MD  Authorized by: Sonu Kessler MD    Preanesthetic Checklist  Completed: patient identified, IV checked, site marked, risks and benefits discussed, surgical consent, monitors and equipment checked, pre-op evaluation and timeout performed  Peripheral Block  Patient position: sitting  Prep: ChloraPrep  Patient monitoring: heart rate, cardiac monitor, continuous pulse ox, continuous capnometry and frequent blood pressure checks  Block type: erector spinae plane  Laterality: bilateral  Injection technique: single shot  Interspace: T6-7    Needle  Needle type: Stimuplex   Needle gauge: 22 G  Needle length: 4 in  Needle localization: anatomical landmarks and ultrasound guidance   -ultrasound image captured on disc.  Assessment  Injection assessment: negative aspiration, negative parasthesia and local visualized surrounding nerve  Paresthesia pain: none  Heart rate change: no  Slow fractionated injection: yes  Pain Tolerance: comfortable throughout block and no complaints  Medications:    Medications: bupivacaine (pf) (MARCAINE) injection 0.75% - Perineural   30 mL - 12/12/2024 7:02:00 AM    Additional Notes  A time out was conducted. Site aditya confirmed with team and patient. Allergies reviewed.   Vital signs stable throughout block. RN monitoring vitals throughout.   Needle advanced under continuous ultrasound guidance.  Local injected incrementally after confirming negative aspiration. No signs or symptoms of intravascular or intraneural injection noted.   No persistent  paresthesias elicited or expressed. Patient tolerated procedure well.  30cc of 0.375% bupi with epinephrine 1:300K

## 2024-12-12 NOTE — TRANSFER OF CARE
"Anesthesia Transfer of Care Note    Patient: Collette Castro    Procedure(s) Performed: Procedure(s) (LRB):  EXCISION, LIVER (N/A)    Patient location: PACU    Transport from OR: Transported from OR on 6-10 L/min O2 by face mask with adequate spontaneous ventilation    Post pain: adequate analgesia    Post assessment: no apparent anesthetic complications    Post vital signs: stable    Level of consciousness: awake, alert and oriented    Complications: none    Transfer of care protocol was followed      Last vitals: Visit Vitals  /69   Pulse 83   Temp 35.9 °C (96.6 °F) (Temporal)   Resp 10   Ht 5' 10" (1.778 m)   Wt 62.6 kg (138 lb)   SpO2 100%   Breastfeeding No   BMI 19.80 kg/m²     "

## 2024-12-13 LAB
ALBUMIN SERPL BCP-MCNC: 2.6 G/DL (ref 3.5–5.2)
ALP SERPL-CCNC: 115 U/L (ref 40–150)
ALT SERPL W/O P-5'-P-CCNC: 358 U/L (ref 10–44)
ANION GAP SERPL CALC-SCNC: 6 MMOL/L (ref 8–16)
AST SERPL-CCNC: 689 U/L (ref 10–40)
BILIRUB SERPL-MCNC: 1 MG/DL (ref 0.1–1)
BUN SERPL-MCNC: 10 MG/DL (ref 6–20)
CALCIUM SERPL-MCNC: 8.1 MG/DL (ref 8.7–10.5)
CHLORIDE SERPL-SCNC: 113 MMOL/L (ref 95–110)
CO2 SERPL-SCNC: 21 MMOL/L (ref 23–29)
CREAT SERPL-MCNC: 0.7 MG/DL (ref 0.5–1.4)
ERYTHROCYTE [DISTWIDTH] IN BLOOD BY AUTOMATED COUNT: 13.7 % (ref 11.5–14.5)
EST. GFR  (NO RACE VARIABLE): >60 ML/MIN/1.73 M^2
GLUCOSE SERPL-MCNC: 104 MG/DL (ref 70–110)
HCT VFR BLD AUTO: 26.9 % (ref 37–48.5)
HGB BLD-MCNC: 8.8 G/DL (ref 12–16)
MAGNESIUM SERPL-MCNC: 1.5 MG/DL (ref 1.6–2.6)
MCH RBC QN AUTO: 31.2 PG (ref 27–31)
MCHC RBC AUTO-ENTMCNC: 32.7 G/DL (ref 32–36)
MCV RBC AUTO: 95 FL (ref 82–98)
OHS QRS DURATION: 62 MS
OHS QTC CALCULATION: 427 MS
PHOSPHATE SERPL-MCNC: 2.9 MG/DL (ref 2.7–4.5)
PLATELET # BLD AUTO: 112 K/UL (ref 150–450)
PMV BLD AUTO: 10.8 FL (ref 9.2–12.9)
POCT GLUCOSE: 121 MG/DL (ref 70–110)
POTASSIUM SERPL-SCNC: 4.2 MMOL/L (ref 3.5–5.1)
PROT SERPL-MCNC: 5 G/DL (ref 6–8.4)
RBC # BLD AUTO: 2.82 M/UL (ref 4–5.4)
SODIUM SERPL-SCNC: 140 MMOL/L (ref 136–145)
WBC # BLD AUTO: 9.56 K/UL (ref 3.9–12.7)

## 2024-12-13 PROCEDURE — 94640 AIRWAY INHALATION TREATMENT: CPT

## 2024-12-13 PROCEDURE — 20600001 HC STEP DOWN PRIVATE ROOM

## 2024-12-13 PROCEDURE — 97530 THERAPEUTIC ACTIVITIES: CPT

## 2024-12-13 PROCEDURE — 25000003 PHARM REV CODE 250

## 2024-12-13 PROCEDURE — 51798 US URINE CAPACITY MEASURE: CPT

## 2024-12-13 PROCEDURE — 99900035 HC TECH TIME PER 15 MIN (STAT)

## 2024-12-13 PROCEDURE — 25000003 PHARM REV CODE 250: Performed by: STUDENT IN AN ORGANIZED HEALTH CARE EDUCATION/TRAINING PROGRAM

## 2024-12-13 PROCEDURE — 84100 ASSAY OF PHOSPHORUS: CPT | Performed by: STUDENT IN AN ORGANIZED HEALTH CARE EDUCATION/TRAINING PROGRAM

## 2024-12-13 PROCEDURE — 97110 THERAPEUTIC EXERCISES: CPT

## 2024-12-13 PROCEDURE — 83735 ASSAY OF MAGNESIUM: CPT | Performed by: STUDENT IN AN ORGANIZED HEALTH CARE EDUCATION/TRAINING PROGRAM

## 2024-12-13 PROCEDURE — 94761 N-INVAS EAR/PLS OXIMETRY MLT: CPT

## 2024-12-13 PROCEDURE — 85027 COMPLETE CBC AUTOMATED: CPT | Performed by: STUDENT IN AN ORGANIZED HEALTH CARE EDUCATION/TRAINING PROGRAM

## 2024-12-13 PROCEDURE — 63600175 PHARM REV CODE 636 W HCPCS: Performed by: STUDENT IN AN ORGANIZED HEALTH CARE EDUCATION/TRAINING PROGRAM

## 2024-12-13 PROCEDURE — 63600175 PHARM REV CODE 636 W HCPCS

## 2024-12-13 PROCEDURE — 80053 COMPREHEN METABOLIC PANEL: CPT | Performed by: STUDENT IN AN ORGANIZED HEALTH CARE EDUCATION/TRAINING PROGRAM

## 2024-12-13 PROCEDURE — 93010 ELECTROCARDIOGRAM REPORT: CPT | Mod: ,,, | Performed by: INTERNAL MEDICINE

## 2024-12-13 PROCEDURE — 25000242 PHARM REV CODE 250 ALT 637 W/ HCPCS: Performed by: STUDENT IN AN ORGANIZED HEALTH CARE EDUCATION/TRAINING PROGRAM

## 2024-12-13 PROCEDURE — 97161 PT EVAL LOW COMPLEX 20 MIN: CPT

## 2024-12-13 PROCEDURE — 25000003 PHARM REV CODE 250: Performed by: NURSE PRACTITIONER

## 2024-12-13 PROCEDURE — 94799 UNLISTED PULMONARY SVC/PX: CPT

## 2024-12-13 PROCEDURE — 97165 OT EVAL LOW COMPLEX 30 MIN: CPT

## 2024-12-13 PROCEDURE — 93005 ELECTROCARDIOGRAM TRACING: CPT

## 2024-12-13 PROCEDURE — 94664 DEMO&/EVAL PT USE INHALER: CPT

## 2024-12-13 RX ORDER — KETOROLAC TROMETHAMINE 10 MG/1
10 TABLET, FILM COATED ORAL EVERY 6 HOURS PRN
Status: DISCONTINUED | OUTPATIENT
Start: 2024-12-13 | End: 2024-12-13

## 2024-12-13 RX ORDER — DEXTROSE 40 %
16 GEL (GRAM) ORAL
Status: DISCONTINUED | OUTPATIENT
Start: 2024-12-13 | End: 2024-12-15 | Stop reason: HOSPADM

## 2024-12-13 RX ORDER — METHOCARBAMOL 500 MG/1
500 TABLET, FILM COATED ORAL EVERY 8 HOURS
Status: COMPLETED | OUTPATIENT
Start: 2024-12-13 | End: 2024-12-15

## 2024-12-13 RX ORDER — DEXTROSE 40 %
24 GEL (GRAM) ORAL
Status: DISCONTINUED | OUTPATIENT
Start: 2024-12-13 | End: 2024-12-15 | Stop reason: HOSPADM

## 2024-12-13 RX ORDER — GLUCAGON 1 MG
1 KIT INJECTION
Status: DISCONTINUED | OUTPATIENT
Start: 2024-12-13 | End: 2024-12-15 | Stop reason: HOSPADM

## 2024-12-13 RX ORDER — IBUPROFEN 200 MG
16 TABLET ORAL
Status: DISCONTINUED | OUTPATIENT
Start: 2024-12-13 | End: 2024-12-13

## 2024-12-13 RX ORDER — PANTOPRAZOLE SODIUM 40 MG/1
40 TABLET, DELAYED RELEASE ORAL DAILY
Status: DISCONTINUED | OUTPATIENT
Start: 2024-12-13 | End: 2024-12-15 | Stop reason: HOSPADM

## 2024-12-13 RX ORDER — ENOXAPARIN SODIUM 100 MG/ML
40 INJECTION SUBCUTANEOUS EVERY 24 HOURS
Status: DISCONTINUED | OUTPATIENT
Start: 2024-12-13 | End: 2024-12-15 | Stop reason: HOSPADM

## 2024-12-13 RX ORDER — KETOROLAC TROMETHAMINE 10 MG/1
10 TABLET, FILM COATED ORAL EVERY 6 HOURS
Status: DISCONTINUED | OUTPATIENT
Start: 2024-12-13 | End: 2024-12-15 | Stop reason: HOSPADM

## 2024-12-13 RX ORDER — DIPHENHYDRAMINE HCL 25 MG
25 CAPSULE ORAL ONCE
Status: COMPLETED | OUTPATIENT
Start: 2024-12-13 | End: 2024-12-13

## 2024-12-13 RX ORDER — METOPROLOL SUCCINATE 50 MG/1
50 TABLET, EXTENDED RELEASE ORAL DAILY
Status: DISCONTINUED | OUTPATIENT
Start: 2024-12-13 | End: 2024-12-15 | Stop reason: HOSPADM

## 2024-12-13 RX ORDER — MAGNESIUM SULFATE HEPTAHYDRATE 40 MG/ML
2 INJECTION, SOLUTION INTRAVENOUS ONCE
Status: COMPLETED | OUTPATIENT
Start: 2024-12-13 | End: 2024-12-13

## 2024-12-13 RX ORDER — HYDROXYZINE HYDROCHLORIDE 10 MG/5ML
10 SYRUP ORAL EVERY 8 HOURS PRN
Status: DISCONTINUED | OUTPATIENT
Start: 2024-12-13 | End: 2024-12-15 | Stop reason: HOSPADM

## 2024-12-13 RX ORDER — INSULIN ASPART 100 [IU]/ML
0-5 INJECTION, SOLUTION INTRAVENOUS; SUBCUTANEOUS
Status: DISCONTINUED | OUTPATIENT
Start: 2024-12-13 | End: 2024-12-15 | Stop reason: HOSPADM

## 2024-12-13 RX ORDER — IBUPROFEN 200 MG
24 TABLET ORAL
Status: DISCONTINUED | OUTPATIENT
Start: 2024-12-13 | End: 2024-12-13

## 2024-12-13 RX ADMIN — METHOCARBAMOL 500 MG: 100 INJECTION INTRAMUSCULAR; INTRAVENOUS at 06:12

## 2024-12-13 RX ADMIN — SODIUM CHLORIDE, SODIUM LACTATE, POTASSIUM CHLORIDE, AND CALCIUM CHLORIDE 500 ML: .6; .31; .03; .02 INJECTION, SOLUTION INTRAVENOUS at 01:12

## 2024-12-13 RX ADMIN — Medication: at 10:12

## 2024-12-13 RX ADMIN — SODIUM CHLORIDE, POTASSIUM CHLORIDE, SODIUM LACTATE AND CALCIUM CHLORIDE 500 ML: 600; 310; 30; 20 INJECTION, SOLUTION INTRAVENOUS at 01:12

## 2024-12-13 RX ADMIN — MUPIROCIN 1 G: 20 OINTMENT TOPICAL at 10:12

## 2024-12-13 RX ADMIN — PROCHLORPERAZINE EDISYLATE 5 MG: 5 INJECTION INTRAMUSCULAR; INTRAVENOUS at 03:12

## 2024-12-13 RX ADMIN — PANTOPRAZOLE SODIUM 40 MG: 40 TABLET, DELAYED RELEASE ORAL at 08:12

## 2024-12-13 RX ADMIN — LEVALBUTEROL HYDROCHLORIDE 0.63 MG: 0.63 SOLUTION RESPIRATORY (INHALATION) at 02:12

## 2024-12-13 RX ADMIN — ENOXAPARIN SODIUM 40 MG: 40 INJECTION SUBCUTANEOUS at 05:12

## 2024-12-13 RX ADMIN — LEVALBUTEROL HYDROCHLORIDE 0.63 MG: 0.63 SOLUTION RESPIRATORY (INHALATION) at 08:12

## 2024-12-13 RX ADMIN — METOPROLOL SUCCINATE 50 MG: 50 TABLET, EXTENDED RELEASE ORAL at 12:12

## 2024-12-13 RX ADMIN — POTASSIUM CHLORIDE: 2 INJECTION, SOLUTION, CONCENTRATE INTRAVENOUS at 02:12

## 2024-12-13 RX ADMIN — KETOROLAC TROMETHAMINE 10 MG: 10 TABLET, FILM COATED ORAL at 10:12

## 2024-12-13 RX ADMIN — METHOCARBAMOL 500 MG: 500 TABLET ORAL at 10:12

## 2024-12-13 RX ADMIN — METHOCARBAMOL 500 MG: 500 TABLET ORAL at 02:12

## 2024-12-13 RX ADMIN — LEVALBUTEROL HYDROCHLORIDE 0.63 MG: 0.63 SOLUTION RESPIRATORY (INHALATION) at 07:12

## 2024-12-13 RX ADMIN — DIPHENHYDRAMINE HYDROCHLORIDE 25 MG: 25 CAPSULE ORAL at 02:12

## 2024-12-13 RX ADMIN — KETOROLAC TROMETHAMINE 10 MG: 10 TABLET, FILM COATED ORAL at 05:12

## 2024-12-13 RX ADMIN — KETOROLAC TROMETHAMINE 10 MG: 10 TABLET, FILM COATED ORAL at 12:12

## 2024-12-13 RX ADMIN — MAGNESIUM SULFATE IN WATER 2 G: 40 INJECTION, SOLUTION INTRAVENOUS at 10:12

## 2024-12-13 NOTE — PT/OT/SLP EVAL
Physical Therapy Co-Evaluation and Co-Treatment    Patient Name:  Collette Castro   MRN:  [id number]  Admit Date: 12/12/2024  Admitting Diagnosis:  <principal problem not specified>   Length of Stay: 1 days  Recent Surgery: Procedure(s) (LRB):  EXCISION, LIVER (N/A) 1 Day Post-Op    Recommendations:     Discharge Recommendations: No Therapy Indicated  Discharge Equipment Recommendations: none   Barriers to discharge:  physical assistance needed    Appropriate transfer level with nursing staff: two person assist stand pivot bed<>chair HHA    Plan:     During this hospitalization, patient to be seen 4 x/week to address the identified rehab impairments via gait training, therapeutic activities, therapeutic exercises, neuromuscular re-education and progress towards the established goals.  Plan of Care Expires:  01/12/25  Plan of Care Reviewed with: patient, spouse    Assessment     Collette Castro is a 49 y.o. female admitted with a medical diagnosis of <principal problem not specified> s/p liver excision 12/12. Pt found supine in bed with  present at bedside and agreeable to therapy evaluation. Pt required light assistance for bed mobility due to abdominal pain. Upon sitting EOB, HR increased to 144, most likely due to pain. Nsg stated it was ok to proceed with mobility and pt could use PCA pump for additional pain management. Upon transfer to chair, HR increased to 152. Once situated in bedside chair, HR returned to ~135. Increased assistance needed for steps to chair due to instability. Pt currently presents well below PLOF with gait instability and impaired endurance due to pain from recent surgery. Pt would continue to benefit from acute care PT services for improved OOB mobility and upright tolerance for safety upon discharge.    Problem List: weakness, impaired endurance, pain, impaired balance, gait instability, impaired functional mobility.  Rehab Prognosis: Good; patient would benefit from acute skilled PT  "services to address these deficits and reach maximum level of function.      Goals:   Multidisciplinary Problems       Physical Therapy Goals          Problem: Physical Therapy    Goal Priority Disciplines Outcome Interventions   Physical Therapy Goal     PT, PT/OT Progressing    Description: Goals to be met by: 2025     Patient will increase functional independence with mobility by performin. Supine to sit with Stand-by Assistance  2. Sit to stand transfer with Supervision  3. Bed to chair transfer with Stand-by Assistance using No Assistive Device  4. Gait  x 200 feet with Stand-by Assistance using No Assistive Device.   5. Stand for 5 minutes with Supervision using No Assistive Device                         Subjective     RN notified prior to session.  present upon PT entrance into room. Patient agreeable to PT evaluation.    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 Pt's bathroom has a tub/shower.  Equipment Owned (not using): none  Equipment Used: none  Prior level of function:  Prior to admission, patient was independent  Work: Employed.   Drive: yes.   Assistance Upon Discharge: significant other    Objective:     Additional staff present: OT and Supervising PT; OT for co-evaluation due to suspected patient need for two skilled therapists to appropriately assess patient's functional deficits as well as ensure patient safety, accommodate for limited activity tolerance, and provide appropriate, skilled assistance to maximize functional potential during evaluation.     Patient found supine with: PCA, peripheral IV, arterial line, blood pressure cuff, " "pulse ox (continuous), telemetry     General Precautions: Standard, Cardiac fall   Orthopedic Precautions:N/A   Braces: N/A   Body mass index is 19.8 kg/m².  Oxygen Device: Room Air  Vitals: /63 (BP Location: Right arm)   Pulse (!) 127   Temp 98.4 °F (36.9 °C) (Oral)   Resp (!) 26   Ht 5' 10" (1.778 m)   Wt 62.6 kg (138 lb)   SpO2 98%   Breastfeeding No   BMI 19.80 kg/m²     Exams:  Cognition:   Oriented X 4 and Alert   Patient is oriented to Person, Place, Time, Situation  Command following: Follows multistep verbal commands  Fluency: clear/fluent  Hearing: Intact  Vision:  Intact  Skin Integrity: red dry skin on back  Postural Assessment: slouched posture and rounded shoulders  Physical Exam:    Left UE Left LE Right UE Right LE   Edema absent absent absent absent   ROM AROM WFL AROM WFL AROM WFL AROM WFL   Strength adequate ROM, adequate strength adequate ROM, adequate strength adequate ROM, adequate strength adequate ROM, adequate strength   Sensation intact to light touch intact to light touch intact to light touch intact to light touch   Coordination not tested due to strength or pain deficits not tested due to strength or pain deficits not tested due to strength or pain deficits not tested due to strength or pain deficits     Outcome Measures:  AM-PAC 6 CLICK MOBILITY  Turning over in bed (including adjusting bedclothes, sheets and blankets)?: 3  Sitting down on and standing up from a chair with arms (e.g., wheelchair, bedside commode, etc.): 3  Moving from lying on back to sitting on the side of the bed?: 3  Moving to and from a bed to a chair (including a wheelchair)?: 3  Need to walk in hospital room?: 3  Climbing 3-5 steps with a railing?: 2  Basic Mobility Total Score: 17     Functional Mobility:    Bed Mobility:   Supine to Sit: minimum assistance for trunk management; to L side of bed  Scooting anteriorly to EOB to have both feet planted on floor: contact guard assistance     Sitting " Balance at Edge of Bed:  Static Sitting Balance: Good- : able to accept minimal resistance  Dynamic Sitting Balance: Good- : able to sit unsupported and weight shift across midline minimally  Assistance Level Required: Stand-by Assistance  Time: ~7 min  Postural deviations noted: forward head, slouched  Comments: Pt maintained good dynamic sitting balance with resistive MMT testing, minimal UE support needed.    Transfers:   Sit <> Stand Transfer: contact guard assistance with hand-held assist. [id number] from EOB  Bed <> Chair Transfer Step Transfer technique: minimum assistance with hand-held assist. Chair on the L    Standing Balance:  Static Standing Balance: Fair- : requires minimal assistance or UE support in order to stand without LOB  Dynamic Standing Balance: Poor+ : able to stand with minimal assistance and reach ipsilaterally. Unable to weight shift.  Assistance Level Required: Contact Guard Assistance and Minimal Assistance  Patient used: hand-held assist       Gait:   Patient ambulated: ~3 ft to chair   Patient required: minimum assistance  Patient used:  hand-held assist   Gait Pattern observed: reciprocal gait  Gait Deviation(s): unsteady gait, decreased step length, narrow base of support, flexed posture, decreased morro, and shuffle gait  Impairments due to: pain and decreased endurance  all lines remained intact throughout ambulation trial  Comments: Patient required cues for sequencing, step through gait pattern, stride length, and width of base of support to increase independence and safety. Patient required cues ~ 50% of the time.    Education:  Time provided for education, counseling and discussion of health disposition in regards to patient's current status  All questions answered within PT scope of practice and to patient's satisfaction  PT role in POC to address current functional deficits  Pt educated on proper body mechanics, safety techniques, and energy conservation with PT facilitation  and cueing throughout session  Call nursing/pct to transfer to chair/use bathroom. Pt stated understanding.  Whiteboard updated with therapist name and pt's current mobility status documented above  Safe to perform stand pivot transfer to/from chair/bedside commode Min A  and HHA w/ nursing/PCT present    Patient left up in chair with all lines intact, call button in reach, RN notified, and  present.    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 OR [address] [place] [address];  Service: Urology;  Laterality: Bilateral;    EXCISION, LIVER N/A 12/12/2024    Procedure: EXCISION, LIVER;  Surgeon: Kimo Yuan MD;  [facility];  Service: General;  Laterality: N/A;    EXPLORATORY LAPAROTOMY      FLEXIBLE SIGMOIDOSCOPY N/A 5/9/2024    Procedure: SIGMOIDOSCOPY, FLEXIBLE;  Surgeon: SANTIAGO Paris MD;  [facility];  Service: Colon and Rectal;  Laterality: N/A;    ILEOSTOMY CLOSURE N/A 8/29/2024    Procedure: CLOSURE, ILEOSTOMY;  Surgeon: SANTIAGO Paris MD;  Location: [facility] OR;  Service: Colon and Rectal;  Laterality: N/A;    LOW ANTERIOR RESECTION OF COLON N/A 5/9/2024    Procedure: RESECTION, COLON, LOW ANTERIOR, robotic converted to open;  Surgeon: SANTIAGO Paris MD;  [facility];  Service: Colon and Rectal;  Laterality: N/A;    LYSIS OF ADHESIONS  5/9/2024    Procedure: LYSIS, ADHESIONS;  Surgeon: SANTIAGO Paris MD;  [facility];  Service: Colon and Rectal;;   extensive, greater  than 2 hours; 22 modifier    MOBILIZATION, SPLENIC FLEXURE, LAPAROSCOPIC  5/9/2024    Procedure: MOBILIZATION, SPLENIC FLEXURE, LAPAROSCOPIC;  Surgeon: SANTIAGO Paris MD;  Location: [facility] OR 2ND FLR;  Service: Colon and Rectal;;  22 modifier; greater than 2 hours    OVARY SURGERY      relocation    ROBOT-ASSISTED LAPAROSCOPIC RESECTION OF SIGMOID COLON USING DA ESTEFANI XI N/A 5/9/2024    Procedure: XI ROBOTIC COLECTOMY, SIGMOID;  Surgeon: SANTIAGO Paris MD;  Location: [facility] OR 2ND FLR;  Service: Colon and Rectal;  Laterality: N/A;  converted to open    thryoid nodule         Family History   Problem Relation Name Age of Onset    Colon cancer Maternal Grandfather  67    Lymphoma Maternal Cousin      Lung cancer Other         Social History     Socioeconomic History    Marital status:    Tobacco Use    Smoking status: Never    Smokeless tobacco: Never   Substance and Sexual Activity    Alcohol use: Not Currently     Comment: once a month    Drug use: Never    Sexual activity: Yes     Partners: Male     Birth control/protection: See Surgical Hx     Social Drivers of Health     Financial Resource Strain: Low Risk  (5/16/2024)    Overall Financial Resource Strain (CARDIA)     Difficulty of Paying Living Expenses: Not hard at all   Food Insecurity: No Food Insecurity (9/4/2024)    Hunger Vital Sign     Worried About Running Out of Food in the Last Year: Never true     Ran Out of Food in the Last Year: Never true   Transportation Needs: No Transportation Needs (9/4/2024)    TRANSPORTATION NEEDS     Transportation : No   Physical Activity: Insufficiently Active (5/16/2024)    Exercise Vital Sign     Days of Exercise per Week: 3 days     Minutes of Exercise per Session: 30 min   Stress: No Stress Concern Present (5/16/2024)    [employer] - Occupational Stress Questionnaire     Feeling of Stress : Not at all   Housing Stability: Low Risk  (9/4/2024)    Housing  Stability Vital Sign     Unable to Pay for Housing in the Last Year: No     Homeless in the Last Year: No       Time Tracking:     PT Received On: 12/13/24  PT Start Time: 1005     PT Stop Time: 1023  PT Total Time (min): 18 min     Billable Minutes: Evaluation 10 min and Therapeutic Exercise 8 min    12/13/2024

## 2024-12-13 NOTE — ANESTHESIA POSTPROCEDURE EVALUATION
Anesthesia Post Evaluation    Patient: Collette Castro    Procedure(s) Performed: Procedure(s) (LRB):  EXCISION, LIVER (N/A)    Final Anesthesia Type: general      Patient location during evaluation: PACU  Patient participation: Yes- Able to Participate  Level of consciousness: awake  Post-procedure vital signs: reviewed and stable  Pain management: adequate  Airway patency: patent    PONV status at discharge: No PONV  Anesthetic complications: no      Cardiovascular status: blood pressure returned to baseline  Respiratory status: unassisted  Hydration status: euvolemic  Follow-up not needed.              Vitals Value Taken Time   BP 94/56 12/13/24 1610   Temp 36.7 °C (98.1 °F) 12/13/24 1600   Pulse 118 12/13/24 1700   Resp 9 12/13/24 1618   SpO2 96 % 12/13/24 1700   Vitals shown include unfiled device data.      Event Time   Out of Recovery 13:43:00         Pain/Timothy Score: Pain Rating Prior to Med Admin: 4 (12/13/2024 12:16 PM)  Timothy Score: 10 (12/12/2024  1:01 PM)

## 2024-12-13 NOTE — PLAN OF CARE
Problem: Occupational Therapy  Goal: Occupational Therapy Goal  Description: Goals to be met by: 12/20/24     Patient will increase functional independence with ADLs by performing:    UE Dressing with Modified [place].  LE Dressing with Modified [place].  Grooming while standing at sink with Modified [place].  Toileting from toilet with Modified [place] for hygiene and clothing management.   Toilet transfer to toilet with Modified [place].    Outcome: Progressing

## 2024-12-13 NOTE — ASSESSMENT & PLAN NOTE
Collette Castro is a 49 y.o. 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 Now s/p non-anatomic partial hepatectomies x 5  with Dr. Yuan on 12/12/24    - Diet: NPO  - mIVF: @80  (D5 1/2NS 20KCl)  - Pain: wean PCA, multimodals  - Gunn: d/c today  - d/c arterial line  - Antibiotics: N/A  - Home Meds: will restart as appropriate  - DVT Prophylaxis: lovenox  - Labs: Daily CBC, CMP, Mg, Ph - replete lytes prn  - PRN nausea control  - Endo: sliding scale insulin  - Aggressive pulmonary toilet: IS, acapella, Xopenex nebs  - OOB/ambulate, PT/OT    Dispo: Continue inpatient care.

## 2024-12-13 NOTE — SUBJECTIVE & OBJECTIVE
Interval History: POD 1 s/p non-anatomic partial hepatectomies x 5. She endorses a few bouts of emesis ON she attributes to pain medicine. Pain well controlled on current regimen.     Medications:  Continuous Infusions:   dextrose 5 % and 0.45 % NaCl with KCl 20 mEq   Intravenous Continuous 80 mL/hr at 12/13/24 0521 Rate Change at 12/13/24 0521    hydromorphone in 0.9 % NaCl 6 mg/30 ml   Intravenous Continuous   New Syringe/Bag at 12/12/24 1249     Scheduled Meds:   acetaminophen  1,000 mg Intravenous Q8H    enoxparin  40 mg Subcutaneous Q24H (prophylaxis, 1700)    levalbuterol  0.63 mg Nebulization Q6H WAKE    methocarbamol injection  500 mg Intravenous Q8H    mupirocin  1 g Nasal BID    pantoprazole  40 mg Intravenous Daily     PRN Meds:  Current Facility-Administered Medications:     dextrose 10%, 12.5 g, Intravenous, PRN    dextrose 10%, 25 g, Intravenous, PRN    naloxone, 0.02 mg, Intravenous, PRN    ondansetron, 4 mg, Intravenous, Q12H PRN    prochlorperazine, 5 mg, Intravenous, Q6H PRN     Review of patient's allergies indicates:   Allergen Reactions    Codeine Other (See Comments)     Per patient, causes pancreatitis    Hydrocodone bitartrate     Hydrocodone-acetaminophen Other (See Comments)    Opioids - morphine analogues      Chest pain/Okay w/Fentanyl 50 mcg - received in ED x3 over 6 hours without issues.    Oxycodone      Pancreatitis w/ all oral narctotics    Tramadol hcl      pancreatitis     Objective:     Vital Signs (Most Recent):  Temp: 97.6 °F (36.4 °C) (12/13/24 0300)  Pulse: 110 (12/13/24 0658)  Resp: (!) 8 (12/13/24 0500)  BP: (!) 113/58 (12/13/24 0200)  SpO2: 97 % (12/13/24 0500) Vital Signs (24h Range):  Temp:  [96.6 °F (35.9 °C)-98 °F (36.7 °C)] 97.6 °F (36.4 °C)  Pulse:  [] 110  Resp:  [6-45] 8  SpO2:  [93 %-100 %] 97 %  BP: (103-127)/(58-75) 113/58  Arterial Line BP: (105-127)/(54-75) 107/56     Weight: 62.6 kg (138 lb)  Body mass index is 19.8 kg/m².    Intake/Output - Last 3  Shifts         12/11 0700  12/12 0659 12/12 0700 12/13 0659 12/13 0700 12/14 0659    I.V. (mL/kg)  2493.3 (39.8)     IV Piggyback  1386.4     Total Intake(mL/kg)  3879.6 (62)     Urine (mL/kg/hr)  1005 (0.7)     Emesis/NG output  200     Total Output  1205     Net  +2674.6            Emesis Occurrence  4 x              Physical Exam  Vitals reviewed.   Constitutional:       General: She is not in acute distress.     Appearance: Normal appearance. She is not ill-appearing.   HENT:      Head: Normocephalic.   Eyes:      Extraocular Movements: Extraocular movements intact.   Cardiovascular:      Rate and Rhythm: Normal rate.      Pulses: Normal pulses.   Pulmonary:      Effort: Pulmonary effort is normal. No respiratory distress.      Breath sounds: No wheezing.   Abdominal:      General: There is no distension.      Palpations: Abdomen is soft.      Comments: Appropriately TTP. Midline incision c/d/I with overlying dermabond   Genitourinary:     Comments: pedro pablo  Musculoskeletal:         General: Normal range of motion.      Cervical back: Normal range of motion.   Skin:     General: Skin is warm.      Capillary Refill: Capillary refill takes less than 2 seconds.   Neurological:      General: No focal deficit present.      Mental Status: She is alert and oriented to person, place, and time.   Psychiatric:         Mood and Affect: Mood normal.          Significant Labs:  I have reviewed all pertinent lab results within the past 24 hours.  CBC:   Recent Labs   Lab 12/13/24  0232   WBC 9.56   RBC 2.82*   HGB 8.8*   HCT 26.9*   *   MCV 95   MCH 31.2*   MCHC 32.7     CMP:   Recent Labs   Lab 12/13/24  0232      CALCIUM 8.1*   ALBUMIN 2.6*   PROT 5.0*      K 4.2   CO2 21*   *   BUN 10   CREATININE 0.7   ALKPHOS 115   *   *   BILITOT 1.0       Significant Diagnostics:  I have reviewed all pertinent imaging results/findings within the past 24 hours.

## 2024-12-13 NOTE — PLAN OF CARE
[facility] - [facility] Intensive Care  Initial Discharge Assessment       Primary Care Provider: Paulette Boone FNP    Admission Diagnosis: Metastatic colon cancer to liver [C18.9, C78.7]  Metastases to the liver [C78.7]    Admission Date: 12/12/2024  Expected Discharge Date: 12/20/2024    Transition of Care Barriers: None    Payor: AETNA / Plan: AETNA CHOICE POS / Product Type: Commercial /     Extended Emergency Contact Information  Primary Emergency Contact: CYRUS CASTRO  Address: [address] [place] [address] [place]  Mobile Phone: [phone]  Relation: Spouse    Discharge Plan A: Home with family  Discharge Plan B: Home      [id number] - [place], [place] - [address]  [address]  [facility] [address]  Phone: [phone] Fax: [phone]      Initial Assessment (most recent)       Adult Discharge Assessment - 12/13/24 1539          Discharge Assessment    Assessment Type Discharge Planning Assessment     Confirmed/corrected address, phone number and insurance Yes     Confirmed Demographics Correct on Facesheet     Source of Information patient     Does patient/caregiver understand observation status Yes     Communicated MARGARITO with patient/caregiver Yes     Reason For Admission no active principal problem     People in Home spouse     Do you expect to return to your current living situation? Yes     Do you have help at home or someone to help you manage your care at home? Yes     Who are your caregiver(s) and their phone number(s)? Cyrus Castro (spouse) [phone]     Prior to hospitilization cognitive status: Alert/Oriented;No Deficits     Current cognitive status: Alert/Oriented;No Deficits     Walking or Climbing Stairs Difficulty no     Dressing/Bathing Difficulty no     Home Accessibility wheelchair accessible     Home Layout Able to live on 1st floor     Equipment Currently Used at Home none     Readmission within 30 days? No     Patient  currently being followed by outpatient case management? No     Do you currently have service(s) that help you manage your care at home? No     Do you take prescription medications? Yes     Do you have prescription coverage? Yes     Coverage [employer] Choice/     Do you have any problems affording any of your prescribed medications? No     Is the patient taking medications as prescribed? yes     Who is going to help you get home at discharge? Cyrus Castro (spouse) [phone]     How do you get to doctors appointments? car, drives self     Are you on dialysis? No     Do you take coumadin? No     Discharge Plan A Home with family     Discharge Plan B Home     DME Needed Upon Discharge  none     Discharge Plan discussed with: Patient     Transition of Care Barriers None        Financial Resource Strain    How hard is it for you to pay for the very basics like food, housing, medical care, and heating? Not hard at all        Housing Stability    In the last 12 months, was there a time when you were not able to pay the mortgage or rent on time? No     At any time in the past 12 months, were you homeless or living in a shelter (including now)? No        Transportation Needs    Has the lack of transportation kept you from medical appointments, meetings, work or from getting things needed for daily living? No        Food Insecurity    Within the past 12 months, you worried that your food would run out before you got the money to buy more. Never true     Within the past 12 months, the food you bought just didn't last and you didn't have money to get more. Never true        Stress    Do you feel stress - tense, restless, nervous, or anxious, or unable to sleep at night because your mind is troubled all the time - these days? Not at all        Social Isolation    How often do you feel lonely or isolated from those around you?  Never        Alcohol Use    Q1: How often do you have a drink containing alcohol? Never     Q2:  How many drinks containing alcohol do you have on a typical day when you are drinking? Patient does not drink     Q3: How often do you have six or more drinks on one occasion? Never        Utilities    In the past 12 months has the electric, gas, oil, or water company threatened to shut off services in your home? No        Health Literacy    How often do you need to have someone help you when you read instructions, pamphlets, or other written material from your doctor or pharmacy? Never        OTHER    Name(s) of People in Home Cyrus Castro CM spoke with patient in Room [address]141 at bedside. All information was verified on facesheet. Patient lives in a 1 story house with spouse, 1 step to get inside with no pets. Patient states no assistance is needed. Patient can ambulate, drive, run errands, and complete ADL's independently. Patient does not use any DME or any in-home assistive equipment. Patient has not used Home Health previously. Patient is not on dialysis nor use Coumadin as a blood thinner. Patient takes medication as prescribed and has resources for all prescriptive needs. Patient will have help from family member upon discharge. Family will provide transportation home. All Questions and concerns addressed and whiteboard updated with CM contact information. Will continue to follow for course of hospitalization.      Discharge Plan A and Plan B have been determined by review of patient's clinical status, future medical and therapeutic needs, and coverage/benefits for post-acute care in coordination with multidisciplinary team members.     Ray Blackwell RN, BSN  Case Management  [phone]

## 2024-12-13 NOTE — PLAN OF CARE
Evaluated and established PT POC.   2024    Problem: Physical Therapy  Goal: Physical Therapy Goal  Description: Goals to be met by: 2025     Patient will increase functional independence with mobility by performin. Supine to sit with Stand-by Assistance  2. Sit to stand transfer with Supervision  3. Bed to chair transfer with Stand-by Assistance using No Assistive Device  4. Gait  x 200 feet with Stand-by Assistance using No Assistive Device.   5. Stand for 5 minutes with Supervision using No Assistive Device    Outcome: Progressing

## 2024-12-13 NOTE — PLAN OF CARE
Problem: Adult Inpatient Plan of Care  Goal: Plan of Care Review  Outcome: Not Progressing  Goal: Patient-Specific Goal (Individualized)  Outcome: Not Progressing  Goal: Absence of Hospital-Acquired Illness or Injury  Outcome: Not Progressing  Goal: Optimal Comfort and Wellbeing  Outcome: Not Progressing  Goal: Readiness for Transition of Care  Outcome: Not Progressing     Problem: Wound  Goal: Optimal Coping  Outcome: Not Progressing  Goal: Optimal Functional Ability  Outcome: Not Progressing  Goal: Absence of Infection Signs and Symptoms  Outcome: Not Progressing  Goal: Improved Oral Intake  Outcome: Not Progressing  Goal: Optimal Pain Control and Function  Outcome: Not Progressing  Goal: Skin Health and Integrity  Outcome: Not Progressing  Goal: Optimal Wound Healing  Outcome: Not Progressing     Problem: Infection  Goal: Absence of Infection Signs and Symptoms  Outcome: Not Progressing     Problem: Fall Injury Risk  Goal: Absence of Fall and Fall-Related Injury  Outcome: Not Progressing

## 2024-12-13 NOTE — PROGRESS NOTES
[facility]  General Surgery  Progress Note    Subjective:     History of Present Illness:  No notes on file    Post-Op Info:  Procedure(s) (LRB):  EXCISION, LIVER (N/A)   1 Day Post-Op     Interval History: POD 1 s/p non-anatomic partial hepatectomies x 5. She endorses a few bouts of emesis ON she attributes to pain medicine. Pain well controlled on current regimen.     Medications:  Continuous Infusions:   dextrose 5 % and 0.45 % NaCl with KCl 20 mEq   Intravenous Continuous 80 mL/hr at 12/13/24 0521 Rate Change at 12/13/24 0521    hydromorphone in 0.9 % NaCl 6 mg/30 ml   Intravenous Continuous   New Syringe/Bag at 12/12/24 1249     Scheduled Meds:   acetaminophen  1,000 mg Intravenous Q8H    enoxparin  40 mg Subcutaneous Q24H (prophylaxis, 1700)    levalbuterol  0.63 mg Nebulization Q6H WAKE    methocarbamol injection  500 mg Intravenous Q8H    mupirocin  1 g Nasal BID    pantoprazole  40 mg Intravenous Daily     PRN Meds:  Current Facility-Administered Medications:     dextrose 10%, 12.5 g, Intravenous, PRN    dextrose 10%, 25 g, Intravenous, PRN    naloxone, 0.02 mg, Intravenous, PRN    ondansetron, 4 mg, Intravenous, Q12H PRN    prochlorperazine, 5 mg, Intravenous, Q6H PRN     Review of patient's allergies indicates:   Allergen Reactions    Codeine Other (See Comments)     Per patient, causes pancreatitis    Hydrocodone bitartrate     Hydrocodone-acetaminophen Other (See Comments)    Opioids - morphine analogues      Chest pain/Okay w/Fentanyl 50 mcg - received in ED x3 over 6 hours without issues.    Oxycodone      Pancreatitis w/ all oral narctotics    Tramadol hcl      pancreatitis     Objective:     Vital Signs (Most Recent):  Temp: 97.6 °F (36.4 °C) (12/13/24 0300)  Pulse: 110 (12/13/24 0658)  Resp: (!) 8 (12/13/24 0500)  BP: (!) 113/58 (12/13/24 0200)  SpO2: 97 % (12/13/24 0500) Vital Signs (24h Range):  Temp:  [96.6 °F (35.9 °C)-98 °F (36.7 °C)] 97.6 °F (36.4 °C)  Pulse:  []  110  Resp:  [6-45] 8  SpO2:  [93 %-100 %] 97 %  BP: (103-127)/(58-75) 113/58  Arterial Line BP: (105-127)/(54-75) 107/56     Weight: 62.6 kg (138 lb)  Body mass index is 19.8 kg/m².    Intake/Output - Last 3 Shifts         12/11 0700  12/12 0659 12/12 0700 12/13 0659 12/13 0700 12/14 0659    I.V. (mL/kg)  2493.3 (39.8)     IV Piggyback  1386.4     Total Intake(mL/kg)  3879.6 (62)     Urine (mL/kg/hr)  1005 (0.7)     Emesis/NG output  200     Total Output  1205     Net  +2674.6            Emesis Occurrence  4 x              Physical Exam  Vitals reviewed.   Constitutional:       General: She is not in acute distress.     Appearance: Normal appearance. She is not ill-appearing.   HENT:      Head: Normocephalic.   Eyes:      Extraocular Movements: Extraocular movements intact.   Cardiovascular:      Rate and Rhythm: Normal rate.      Pulses: Normal pulses.   Pulmonary:      Effort: Pulmonary effort is normal. No respiratory distress.      Breath sounds: No wheezing.   Abdominal:      General: There is no distension.      Palpations: Abdomen is soft.      Comments: Appropriately TTP. Midline incision c/d/I with overlying dermabond   Genitourinary:     Comments: pedro pablo  Musculoskeletal:         General: Normal range of motion.      Cervical back: Normal range of motion.   Skin:     General: Skin is warm.      Capillary Refill: Capillary refill takes less than 2 seconds.   Neurological:      General: No focal deficit present.      Mental Status: She is alert and oriented to person, place, and time.   Psychiatric:         Mood and Affect: Mood normal.          Significant Labs:  I have reviewed all pertinent lab results within the past 24 hours.  CBC:   Recent Labs   Lab 12/13/24  0232   WBC 9.56   RBC 2.82*   HGB 8.8*   HCT 26.9*   *   MCV 95   MCH 31.2*   MCHC 32.7     CMP:   Recent Labs   Lab 12/13/24  0232      CALCIUM 8.1*   ALBUMIN 2.6*   PROT 5.0*      K 4.2   CO2 21*   *   BUN 10    CREATININE 0.7   ALKPHOS 115   *   *   BILITOT 1.0       Significant Diagnostics:  I have reviewed all pertinent imaging results/findings within the past 24 hours.  Assessment/Plan:     Malignant neoplasm of sigmoid colon  Collette Castro is a 49 y.o. 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 Now s/p non-anatomic partial hepatectomies x 5  with Dr. Yuan on 12/12/24    - Diet: CLD as tolerated  - mIVF: @80  (D5 1/2NS 20KCl)  - Pain: wean PCA, multimodals  - Gunn: d/c today  - d/c arterial line  - Antibiotics: N/A  - Home Meds: will restart as appropriate  - DVT Prophylaxis: lovenox  - Labs: Daily CBC, CMP, Mg, Ph - replete lytes prn  - PRN nausea control  - Endo: sliding scale insulin  - Aggressive pulmonary toilet: IS, acapella, Xopenex nebs  - OOB/ambulate, PT/OT    Dispo: Continue inpatient care.            Lizeth Valenzuela MD  General Surgery  [facility]

## 2024-12-13 NOTE — PT/OT/SLP EVAL
Occupational Therapy   Co-Evaluation    Name: Collette Castro  MRN: [id number]  Admitting Diagnosis: <principal problem not specified>  Recent Surgery: Procedure(s) (LRB):  EXCISION, LIVER (N/A) 1 Day Post-Op    Recommendations:     Discharge Recommendations: No Therapy Indicated  Discharge Equipment Recommendations:  none  Barriers to discharge:  None    Assessment:     Collette Castro is a 49 y.o. female with a medical diagnosis of colon cancer with mets to liver.   She presents alert and motivated with good physical strength, but limited by tachycardia. Performance deficits affecting function: weakness, impaired self care skills, impaired functional mobility, impaired endurance. Pt benefits from co-treatment with PT to accommodate pt's activity tolerance and progression with therapy.    Rehab Prognosis: Good; patient would benefit from acute skilled OT services to address these deficits and reach maximum level of function.       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  Patient found supine with blood pressure cuff, pulse ox (continuous), telemetry, arterial line, PCA,  peripheral IV upon OT entry to room.    General Precautions: Standard, fall  Orthopedic Precautions:    Braces:    Respiratory Status: Room air    Occupational Performance:    Bed Mobility:    Patient completed Scooting/Bridging with contact guard assistance  Patient completed Supine to Sit with minimum assistance    Functional Mobility/Transfers:  Patient completed Sit <> Stand Transfer with contact guard assistance  with  hand-held assist   Patient completed Bed <> Chair Transfer using Step Transfer technique with minimum assistance with hand-held assist  Functional Mobility: Min A x ~6 steps to chair; distance limited by tachycardia    Activities of Daily Living:  Grooming: independence    Upper Body Dressing: stand by assistance    Lower Body Dressing: minimum assistance      Cognitive/Visual Perceptual:  Oriented to: Person, Place, Time and Situation  Follows Commands/attention: Follows multistep  commands  Communication: clear/fluent  Memory:  No Deficits noted  Safety awareness/insight to disability: intact  Coping skills/emotional control: Appropriate to situation    Physical Exam:  Postural examination/scapula alignment:    -       No postural abnormalities identified  Sensation:    -       Intact  Upper Extremity Range of Motion:     -       Right Upper Extremity: WFL  -       Left Upper Extremity: WFL  Upper Extremity Strength:    -       Right Upper Extremity: WFL  -       Left Upper Extremity: WFL   Strength:    -       Right Upper Extremity: WFL  -       Left Upper Extremity: WFL  Fine Motor Coordination:    -       Intact  Gross motor coordination:   WFL    AMPAC 6 Click ADL:  AMPAC Total Score: 21    Treatment & Education:  Pt ed on OT POC  Pt sat EOB with HR increasing to 144, though asymptomatic; RN aware and ok'd proceeding to chair  During mobility to chair, HR increased to 152. Again, pt asymptomatic. Pt reclined in chair for comfort and  at end of session  Pt ed on ROM ex's daily for  increased overall strength and endurance to reduce risk of hospital acquired weakness  Pt ed on safety with ADL and fall risk  Reinforced use of call button to contact nursing staff for assistance with mobility    Patient left up in chair with all lines intact, call button in reach, RN notified, and spouse present    GOALS:   Multidisciplinary Problems       Occupational Therapy Goals          Problem: Occupational Therapy    Goal Priority Disciplines Outcome Interventions   Occupational Therapy Goal     OT, PT/OT Progressing    Description: Goals to be met by: 12/20/24     Patient will increase functional independence with ADLs by performing:    UE Dressing with Modified [place].  LE Dressing with Modified [place].  Grooming while standing at sink with Modified [place].  Toileting from toilet with Modified [place] for hygiene and clothing management.   Toilet transfer to toilet with Modified [place].                         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    FANCC gene c.553C>T ( p.R185*) - [facility] Syndrome Analyses with [employer]-Expanded (77 genes)    Pre-diabetes     Sphincter of Oddi dysfunction          Past Surgical History:   Procedure Laterality Date    CHOLECYSTECTOMY      COLONOSCOPY      CYSTOSCOPY W/ URETERAL STENT PLACEMENT Bilateral 5/9/2024    Procedure: CYSTOSCOPY, WITH URETERAL STENT INSERTION;  Surgeon: Cas Bang MD;  Location: [address];  Service: Urology;  Laterality: Bilateral;    EXCISION, LIVER N/A 12/12/2024    Procedure: EXCISION, LIVER;  Surgeon: Kimo Yuan MD;  Location: [address];  Service: General;  Laterality: N/A;    EXPLORATORY LAPAROTOMY      FLEXIBLE SIGMOIDOSCOPY N/A 5/9/2024    Procedure: SIGMOIDOSCOPY, FLEXIBLE;  Surgeon: SANTIAGO Paris MD;  Location: [facility] OR [address] [place] [address];  Service: Colon and Rectal;  Laterality: N/A;     ILEOSTOMY CLOSURE N/A 8/29/2024    Procedure: CLOSURE, ILEOSTOMY;  Surgeon: SANTIAGO Paris MD;  Location: [facility] OR;  Service: Colon and Rectal;  Laterality: N/A;    LOW ANTERIOR RESECTION OF COLON N/A 5/9/2024    Procedure: RESECTION, COLON, LOW ANTERIOR, robotic converted to open;  Surgeon: SANTIAGO Paris MD;  Location: [facility] OR 2ND FLR;  Service: Colon and Rectal;  Laterality: N/A;    LYSIS OF ADHESIONS  5/9/2024    Procedure: LYSIS, ADHESIONS;  Surgeon: SANTIAGO Paris MD;  Location: [facility] OR 2ND FLR;  Service: Colon and Rectal;;  extensive, greater  than 2 hours; 22 modifier    MOBILIZATION, SPLENIC FLEXURE, LAPAROSCOPIC  5/9/2024    Procedure: MOBILIZATION, SPLENIC FLEXURE, LAPAROSCOPIC;  Surgeon: SANTIAGO Paris MD;  Location: [facility] OR 2ND FLR;  Service: Colon and Rectal;;  22 modifier; greater than 2 hours    OVARY SURGERY      relocation    ROBOT-ASSISTED LAPAROSCOPIC RESECTION OF SIGMOID COLON USING DA ESTEFANI XI N/A 5/9/2024    Procedure: XI ROBOTIC COLECTOMY, SIGMOID;  Surgeon: SANTIAGO Paris MD;  Location: [facility] OR 2ND FLR;  Service: Colon and Rectal;  Laterality: N/A;  converted to open    thryoid nodule         Time Tracking:     OT Date of Treatment: 12/13/24  OT Start Time: 1001  OT Stop Time: 1023  OT Total Time (min): 22 min    Billable Minutes:Evaluation 10  Therapeutic Activity 10    12/13/2024

## 2024-12-14 LAB
ALBUMIN SERPL BCP-MCNC: 2.1 G/DL (ref 3.5–5.2)
ALP SERPL-CCNC: 124 U/L (ref 40–150)
ALT SERPL W/O P-5'-P-CCNC: 319 U/L (ref 10–44)
ANION GAP SERPL CALC-SCNC: 3 MMOL/L (ref 8–16)
AST SERPL-CCNC: 406 U/L (ref 10–40)
BILIRUB SERPL-MCNC: 1.1 MG/DL (ref 0.1–1)
BUN SERPL-MCNC: 8 MG/DL (ref 6–20)
CALCIUM SERPL-MCNC: 7.7 MG/DL (ref 8.7–10.5)
CHLORIDE SERPL-SCNC: 110 MMOL/L (ref 95–110)
CO2 SERPL-SCNC: 21 MMOL/L (ref 23–29)
CREAT SERPL-MCNC: 0.7 MG/DL (ref 0.5–1.4)
ERYTHROCYTE [DISTWIDTH] IN BLOOD BY AUTOMATED COUNT: 14.2 % (ref 11.5–14.5)
EST. GFR  (NO RACE VARIABLE): >60 ML/MIN/1.73 M^2
GLUCOSE SERPL-MCNC: 85 MG/DL (ref 70–110)
HCT VFR BLD AUTO: 24.2 % (ref 37–48.5)
HGB BLD-MCNC: 8 G/DL (ref 12–16)
MAGNESIUM SERPL-MCNC: 1.7 MG/DL (ref 1.6–2.6)
MCH RBC QN AUTO: 32.3 PG (ref 27–31)
MCHC RBC AUTO-ENTMCNC: 33.1 G/DL (ref 32–36)
MCV RBC AUTO: 98 FL (ref 82–98)
PHOSPHATE SERPL-MCNC: 1.5 MG/DL (ref 2.7–4.5)
PLATELET # BLD AUTO: 87 K/UL (ref 150–450)
PMV BLD AUTO: 11.3 FL (ref 9.2–12.9)
POCT GLUCOSE: 107 MG/DL (ref 70–110)
POCT GLUCOSE: 96 MG/DL (ref 70–110)
POCT GLUCOSE: 98 MG/DL (ref 70–110)
POTASSIUM SERPL-SCNC: 4.3 MMOL/L (ref 3.5–5.1)
PROT SERPL-MCNC: 4.4 G/DL (ref 6–8.4)
RBC # BLD AUTO: 2.48 M/UL (ref 4–5.4)
SODIUM SERPL-SCNC: 134 MMOL/L (ref 136–145)
WBC # BLD AUTO: 8.34 K/UL (ref 3.9–12.7)

## 2024-12-14 PROCEDURE — 63600175 PHARM REV CODE 636 W HCPCS: Performed by: STUDENT IN AN ORGANIZED HEALTH CARE EDUCATION/TRAINING PROGRAM

## 2024-12-14 PROCEDURE — 99900035 HC TECH TIME PER 15 MIN (STAT)

## 2024-12-14 PROCEDURE — 84100 ASSAY OF PHOSPHORUS: CPT | Performed by: STUDENT IN AN ORGANIZED HEALTH CARE EDUCATION/TRAINING PROGRAM

## 2024-12-14 PROCEDURE — 94664 DEMO&/EVAL PT USE INHALER: CPT

## 2024-12-14 PROCEDURE — 25000003 PHARM REV CODE 250

## 2024-12-14 PROCEDURE — 27000221 HC OXYGEN, UP TO 24 HOURS

## 2024-12-14 PROCEDURE — 94640 AIRWAY INHALATION TREATMENT: CPT

## 2024-12-14 PROCEDURE — 63600175 PHARM REV CODE 636 W HCPCS

## 2024-12-14 PROCEDURE — 25000003 PHARM REV CODE 250: Performed by: STUDENT IN AN ORGANIZED HEALTH CARE EDUCATION/TRAINING PROGRAM

## 2024-12-14 PROCEDURE — 36415 COLL VENOUS BLD VENIPUNCTURE: CPT | Performed by: STUDENT IN AN ORGANIZED HEALTH CARE EDUCATION/TRAINING PROGRAM

## 2024-12-14 PROCEDURE — 85027 COMPLETE CBC AUTOMATED: CPT | Performed by: STUDENT IN AN ORGANIZED HEALTH CARE EDUCATION/TRAINING PROGRAM

## 2024-12-14 PROCEDURE — 80053 COMPREHEN METABOLIC PANEL: CPT | Performed by: STUDENT IN AN ORGANIZED HEALTH CARE EDUCATION/TRAINING PROGRAM

## 2024-12-14 PROCEDURE — 94799 UNLISTED PULMONARY SVC/PX: CPT

## 2024-12-14 PROCEDURE — 94761 N-INVAS EAR/PLS OXIMETRY MLT: CPT

## 2024-12-14 PROCEDURE — 20600001 HC STEP DOWN PRIVATE ROOM

## 2024-12-14 PROCEDURE — 25000242 PHARM REV CODE 250 ALT 637 W/ HCPCS: Performed by: STUDENT IN AN ORGANIZED HEALTH CARE EDUCATION/TRAINING PROGRAM

## 2024-12-14 PROCEDURE — 25000003 PHARM REV CODE 250: Performed by: NURSE PRACTITIONER

## 2024-12-14 PROCEDURE — 83735 ASSAY OF MAGNESIUM: CPT | Performed by: STUDENT IN AN ORGANIZED HEALTH CARE EDUCATION/TRAINING PROGRAM

## 2024-12-14 RX ORDER — HYDROMORPHONE HYDROCHLORIDE 1 MG/ML
0.5 INJECTION, SOLUTION INTRAMUSCULAR; INTRAVENOUS; SUBCUTANEOUS EVERY 4 HOURS PRN
Status: DISCONTINUED | OUTPATIENT
Start: 2024-12-14 | End: 2024-12-15 | Stop reason: HOSPADM

## 2024-12-14 RX ORDER — GABAPENTIN 300 MG/1
300 CAPSULE ORAL 3 TIMES DAILY
Status: DISCONTINUED | OUTPATIENT
Start: 2024-12-14 | End: 2024-12-15 | Stop reason: HOSPADM

## 2024-12-14 RX ADMIN — GABAPENTIN 300 MG: 300 CAPSULE ORAL at 07:12

## 2024-12-14 RX ADMIN — MUPIROCIN 1 G: 20 OINTMENT TOPICAL at 09:12

## 2024-12-14 RX ADMIN — GABAPENTIN 300 MG: 300 CAPSULE ORAL at 09:12

## 2024-12-14 RX ADMIN — SODIUM CHLORIDE, SODIUM LACTATE, POTASSIUM CHLORIDE, AND CALCIUM CHLORIDE 1000 ML: .6; .31; .03; .02 INJECTION, SOLUTION INTRAVENOUS at 03:12

## 2024-12-14 RX ADMIN — METHOCARBAMOL 500 MG: 500 TABLET ORAL at 09:12

## 2024-12-14 RX ADMIN — ENOXAPARIN SODIUM 40 MG: 40 INJECTION SUBCUTANEOUS at 05:12

## 2024-12-14 RX ADMIN — LEVALBUTEROL HYDROCHLORIDE 0.63 MG: 0.63 SOLUTION RESPIRATORY (INHALATION) at 03:12

## 2024-12-14 RX ADMIN — HYDROMORPHONE HYDROCHLORIDE 0.5 MG: 1 INJECTION, SOLUTION INTRAMUSCULAR; INTRAVENOUS; SUBCUTANEOUS at 05:12

## 2024-12-14 RX ADMIN — METHOCARBAMOL 500 MG: 500 TABLET ORAL at 06:12

## 2024-12-14 RX ADMIN — KETOROLAC TROMETHAMINE 10 MG: 10 TABLET, FILM COATED ORAL at 05:12

## 2024-12-14 RX ADMIN — KETOROLAC TROMETHAMINE 10 MG: 10 TABLET, FILM COATED ORAL at 11:12

## 2024-12-14 RX ADMIN — POTASSIUM CHLORIDE: 2 INJECTION, SOLUTION, CONCENTRATE INTRAVENOUS at 02:12

## 2024-12-14 RX ADMIN — LEVALBUTEROL HYDROCHLORIDE 0.63 MG: 0.63 SOLUTION RESPIRATORY (INHALATION) at 09:12

## 2024-12-14 RX ADMIN — GABAPENTIN 300 MG: 300 CAPSULE ORAL at 02:12

## 2024-12-14 RX ADMIN — KETOROLAC TROMETHAMINE 10 MG: 10 TABLET, FILM COATED ORAL at 06:12

## 2024-12-14 RX ADMIN — METHOCARBAMOL 500 MG: 500 TABLET ORAL at 01:12

## 2024-12-14 RX ADMIN — PANTOPRAZOLE SODIUM 40 MG: 40 TABLET, DELAYED RELEASE ORAL at 09:12

## 2024-12-14 NOTE — NURSING
RN paged surgical resident on call due to consistently low blood pressures this shift, awaiting call back at this time.

## 2024-12-14 NOTE — NURSING
Patient 02 alarming when patient falls asleep due to sats dropping to 89-91 while sleeping. Pt placed on 2LPM via NC

## 2024-12-14 NOTE — ASSESSMENT & PLAN NOTE
Collette Castro is a 49 y.o. 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 Now s/p non-anatomic partial hepatectomies x 5  with Dr. Yuan on 12/12/24    - Diet: Advance to regular diet today  - mIVF: discontinue today; bolus as needed  - Pain: dc PCA; PO robaxin, toradol, and gabapentin w/ breakthrough ordered  - Antibiotics: N/A  - Home Meds: will restart as appropriate  - DVT Prophylaxis: lovenox  - Labs: Daily CBC, CMP, Mg, Ph - replete lytes prn  - PRN nausea control  - Endo: sliding scale insulin  - Aggressive pulmonary toilet: IS, acapella, Xopenex nebs  - OOB/ambulate, PT/OT    Dispo: Continue inpatient care.

## 2024-12-14 NOTE — PLAN OF CARE
Problem: Adult Inpatient Plan of Care  Goal: Plan of Care Review  Outcome: Progressing  Goal: Absence of Hospital-Acquired Illness or Injury  Outcome: Progressing  Goal: Optimal Comfort and Wellbeing  Outcome: Progressing  Goal: Readiness for Transition of Care  Outcome: Progressing     Problem: Wound  Goal: Optimal Functional Ability  Outcome: Progressing  Goal: Improved Oral Intake  Outcome: Progressing  Goal: Optimal Pain Control and Function  Outcome: Progressing     Problem: Infection  Goal: Absence of Infection Signs and Symptoms  Outcome: Progressing

## 2024-12-14 NOTE — SUBJECTIVE & OBJECTIVE
Interval History: No major changes overnight. Mildly soft pressures this morning which seem to be her baseline; responded well to a bolus. Otherwise no major complaints. Pain is well managed. Tolerating diet. Denies any nausea or vomiting. Passing gas. No BM.    Medications:  Continuous Infusions:  Scheduled Meds:   enoxparin  40 mg Subcutaneous Q24H (prophylaxis, 1700)    gabapentin  300 mg Oral TID    ketorolac  10 mg Oral Q6H    levalbuterol  0.63 mg Nebulization Q6H WAKE    methocarbamoL  500 mg Oral Q8H    metoprolol succinate  50 mg Oral Daily    mupirocin  1 g Nasal BID    pantoprazole  40 mg Oral Daily     PRN Meds:  Current Facility-Administered Medications:     dextrose 10%, 12.5 g, Intravenous, PRN    dextrose 10%, 25 g, Intravenous, PRN    dextrose, 16 g, Oral, PRN    dextrose, 24 g, Oral, PRN    glucagon (human recombinant), 1 mg, Intramuscular, PRN    HYDROmorphone, 0.5 mg, Intravenous, Q4H PRN    hydrOXYzine, 10 mg, Oral, Q8H PRN    insulin aspart U-100, 0-5 Units, Subcutaneous, QID (AC + HS) PRN    ondansetron, 4 mg, Intravenous, Q12H PRN    prochlorperazine, 5 mg, Intravenous, Q6H PRN     Review of patient's allergies indicates:   Allergen Reactions    Codeine Other (See Comments)     Per patient, causes pancreatitis    Hydrocodone bitartrate     Hydrocodone-acetaminophen Other (See Comments)    Opioids - morphine analogues      Chest pain/Okay w/Fentanyl 50 mcg - received in ED x3 over 6 hours without issues.    Oxycodone      Pancreatitis w/ all oral narctotics    Tramadol hcl      pancreatitis     Objective:     Vital Signs (Most Recent):  Temp: 98.5 °F (36.9 °C) (12/14/24 0701)  Pulse: 91 (12/14/24 0701)  Resp: 18 (12/14/24 0701)  BP: 98/60 (12/14/24 0626)  SpO2: 100 % (12/14/24 0701) Vital Signs (24h Range):  Temp:  [97.8 °F (36.6 °C)-98.5 °F (36.9 °C)] 98.5 °F (36.9 °C)  Pulse:  [] 91  Resp:  [10-26] 18  SpO2:  [93 %-100 %] 100 %  BP: ()/(49-63) 98/60     Weight: 62.6 kg (137 lb  15.8 oz)  Body mass index is 19.8 kg/m².    Intake/Output - Last 3 Shifts         12/12 0700  12/13 0659 12/13 0700  12/14 0659 12/14 0700  12/15 0659    I.V. (mL/kg) 2493.3 (39.8)      IV Piggyback 1386.4      Total Intake(mL/kg) 3879.6 (62)      Urine (mL/kg/hr) 1005 (0.7) 800 (0.5)     Emesis/NG output 200      Total Output 1205 800     Net +2674.6 -800            Urine Occurrence  2 x     Emesis Occurrence 4 x               Physical Exam  Vitals reviewed.   Constitutional:       General: She is not in acute distress.     Appearance: Normal appearance. She is not ill-appearing.   HENT:      Head: Normocephalic.      Nose:      Comments: NC  Eyes:      Extraocular Movements: Extraocular movements intact.   Cardiovascular:      Rate and Rhythm: Normal rate.      Pulses: Normal pulses.   Pulmonary:      Effort: Pulmonary effort is normal. No respiratory distress.      Breath sounds: No wheezing.   Abdominal:      General: There is no distension.      Palpations: Abdomen is soft.      Comments: Appropriately TTP. Midline incision c/d/I with overlying dermabond   Musculoskeletal:         General: Normal range of motion.      Cervical back: Normal range of motion.   Skin:     General: Skin is warm.      Capillary Refill: Capillary refill takes less than 2 seconds.   Neurological:      General: No focal deficit present.      Mental Status: She is alert and oriented to person, place, and time.   Psychiatric:         Mood and Affect: Mood normal.          Significant Labs:  I have reviewed all pertinent lab results within the past 24 hours.    Significant Diagnostics:  I have reviewed all pertinent imaging results/findings within the past 24 hours.

## 2024-12-14 NOTE — PROGRESS NOTES
[facility]  General Surgery  Progress Note    Subjective:     History of Present Illness:  No notes on file    Post-Op Info:  Procedure(s) (LRB):  EXCISION, LIVER (N/A)   2 Days Post-Op     Interval History: No major changes overnight. Mildly soft pressures this morning which seem to be her baseline; responded well to a bolus. Otherwise no major complaints. Pain is well managed. Tolerating diet. Denies any nausea or vomiting. Passing gas. No BM.    Medications:  Continuous Infusions:  Scheduled Meds:   enoxparin  40 mg Subcutaneous Q24H (prophylaxis, 1700)    gabapentin  300 mg Oral TID    ketorolac  10 mg Oral Q6H    levalbuterol  0.63 mg Nebulization Q6H WAKE    methocarbamoL  500 mg Oral Q8H    metoprolol succinate  50 mg Oral Daily    mupirocin  1 g Nasal BID    pantoprazole  40 mg Oral Daily     PRN Meds:  Current Facility-Administered Medications:     dextrose 10%, 12.5 g, Intravenous, PRN    dextrose 10%, 25 g, Intravenous, PRN    dextrose, 16 g, Oral, PRN    dextrose, 24 g, Oral, PRN    glucagon (human recombinant), 1 mg, Intramuscular, PRN    HYDROmorphone, 0.5 mg, Intravenous, Q4H PRN    hydrOXYzine, 10 mg, Oral, Q8H PRN    insulin aspart U-100, 0-5 Units, Subcutaneous, QID (AC + HS) PRN    ondansetron, 4 mg, Intravenous, Q12H PRN    prochlorperazine, 5 mg, Intravenous, Q6H PRN     Review of patient's allergies indicates:   Allergen Reactions    Codeine Other (See Comments)     Per patient, causes pancreatitis    Hydrocodone bitartrate     Hydrocodone-acetaminophen Other (See Comments)    Opioids - morphine analogues      Chest pain/Okay w/Fentanyl 50 mcg - received in ED x3 over 6 hours without issues.    Oxycodone      Pancreatitis w/ all oral narctotics    Tramadol hcl      pancreatitis     Objective:     Vital Signs (Most Recent):  Temp: 98.5 °F (36.9 °C) (12/14/24 0701)  Pulse: 91 (12/14/24 0701)  Resp: 18 (12/14/24 0701)  BP: 98/60 (12/14/24 0626)  SpO2: 100 % (12/14/24 0701)  Vital Signs (24h Range):  Temp:  [97.8 °F (36.6 °C)-98.5 °F (36.9 °C)] 98.5 °F (36.9 °C)  Pulse:  [] 91  Resp:  [10-26] 18  SpO2:  [93 %-100 %] 100 %  BP: ()/(49-63) 98/60     Weight: 62.6 kg (137 lb 15.8 oz)  Body mass index is 19.8 kg/m².    Intake/Output - Last 3 Shifts         12/12 0700  12/13 0659 12/13 0700  12/14 0659 12/14 0700  12/15 0659    I.V. (mL/kg) 2493.3 (39.8)      IV Piggyback 1386.4      Total Intake(mL/kg) 3879.6 (62)      Urine (mL/kg/hr) 1005 (0.7) 800 (0.5)     Emesis/NG output 200      Total Output 1205 800     Net +2674.6 -800            Urine Occurrence  2 x     Emesis Occurrence 4 x               Physical Exam  Vitals reviewed.   Constitutional:       General: She is not in acute distress.     Appearance: Normal appearance. She is not ill-appearing.   HENT:      Head: Normocephalic.      Nose:      Comments: NC  Eyes:      Extraocular Movements: Extraocular movements intact.   Cardiovascular:      Rate and Rhythm: Normal rate.      Pulses: Normal pulses.   Pulmonary:      Effort: Pulmonary effort is normal. No respiratory distress.      Breath sounds: No wheezing.   Abdominal:      General: There is no distension.      Palpations: Abdomen is soft.      Comments: Appropriately TTP. Midline incision c/d/I with overlying dermabond   Musculoskeletal:         General: Normal range of motion.      Cervical back: Normal range of motion.   Skin:     General: Skin is warm.      Capillary Refill: Capillary refill takes less than 2 seconds.   Neurological:      General: No focal deficit present.      Mental Status: She is alert and oriented to person, place, and time.   Psychiatric:         Mood and Affect: Mood normal.          Significant Labs:  I have reviewed all pertinent lab results within the past 24 hours.    Significant Diagnostics:  I have reviewed all pertinent imaging results/findings within the past 24 hours.  Assessment/Plan:     Malignant neoplasm of sigmoid colon  Collette  Matthew is a 49 y.o. 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 Now s/p non-anatomic partial hepatectomies x 5  with Dr. Yuan on 12/12/24    - Diet: Advance to regular diet today  - mIVF: discontinue today; bolus as needed  - Pain: dc PCA; PO robaxin, toradol, and gabapentin w/ breakthrough ordered  - Antibiotics: N/A  - Home Meds: will restart as appropriate  - DVT Prophylaxis: lovenox  - Labs: Daily CBC, CMP, Mg, Ph - replete lytes prn  - PRN nausea control  - Endo: sliding scale insulin  - Aggressive pulmonary toilet: IS, acapella, Xopenex nebs  - OOB/ambulate, PT/OT    Dispo: Continue inpatient care.            Nasrin Ford MD  General Surgery  [facility]

## 2024-12-15 VITALS
TEMPERATURE: 99 F | RESPIRATION RATE: 14 BRPM | DIASTOLIC BLOOD PRESSURE: 65 MMHG | BODY MASS INDEX: 19.76 KG/M2 | HEIGHT: 70 IN | WEIGHT: 138 LBS | SYSTOLIC BLOOD PRESSURE: 108 MMHG | HEART RATE: 105 BPM | OXYGEN SATURATION: 99 %

## 2024-12-15 LAB
ALBUMIN SERPL BCP-MCNC: 2 G/DL (ref 3.5–5.2)
ALP SERPL-CCNC: 146 U/L (ref 40–150)
ALT SERPL W/O P-5'-P-CCNC: 268 U/L (ref 10–44)
ANION GAP SERPL CALC-SCNC: 4 MMOL/L (ref 8–16)
AST SERPL-CCNC: 276 U/L (ref 10–40)
BILIRUB SERPL-MCNC: 1 MG/DL (ref 0.1–1)
BUN SERPL-MCNC: 11 MG/DL (ref 6–20)
CALCIUM SERPL-MCNC: 7.9 MG/DL (ref 8.7–10.5)
CHLORIDE SERPL-SCNC: 112 MMOL/L (ref 95–110)
CO2 SERPL-SCNC: 23 MMOL/L (ref 23–29)
CREAT SERPL-MCNC: 0.7 MG/DL (ref 0.5–1.4)
ERYTHROCYTE [DISTWIDTH] IN BLOOD BY AUTOMATED COUNT: 14.1 % (ref 11.5–14.5)
EST. GFR  (NO RACE VARIABLE): >60 ML/MIN/1.73 M^2
GLUCOSE SERPL-MCNC: 96 MG/DL (ref 70–110)
HCT VFR BLD AUTO: 23.3 % (ref 37–48.5)
HGB BLD-MCNC: 7.6 G/DL (ref 12–16)
MAGNESIUM SERPL-MCNC: 1.7 MG/DL (ref 1.6–2.6)
MCH RBC QN AUTO: 31.5 PG (ref 27–31)
MCHC RBC AUTO-ENTMCNC: 32.6 G/DL (ref 32–36)
MCV RBC AUTO: 97 FL (ref 82–98)
PHOSPHATE SERPL-MCNC: 2.3 MG/DL (ref 2.7–4.5)
PLATELET # BLD AUTO: 95 K/UL (ref 150–450)
PMV BLD AUTO: 11.2 FL (ref 9.2–12.9)
POCT GLUCOSE: 94 MG/DL (ref 70–110)
POTASSIUM SERPL-SCNC: 4.3 MMOL/L (ref 3.5–5.1)
PROT SERPL-MCNC: 4.7 G/DL (ref 6–8.4)
RBC # BLD AUTO: 2.41 M/UL (ref 4–5.4)
SODIUM SERPL-SCNC: 139 MMOL/L (ref 136–145)
WBC # BLD AUTO: 6.04 K/UL (ref 3.9–12.7)

## 2024-12-15 PROCEDURE — 83735 ASSAY OF MAGNESIUM: CPT | Performed by: STUDENT IN AN ORGANIZED HEALTH CARE EDUCATION/TRAINING PROGRAM

## 2024-12-15 PROCEDURE — 80053 COMPREHEN METABOLIC PANEL: CPT | Performed by: STUDENT IN AN ORGANIZED HEALTH CARE EDUCATION/TRAINING PROGRAM

## 2024-12-15 PROCEDURE — 25000003 PHARM REV CODE 250: Performed by: NURSE PRACTITIONER

## 2024-12-15 PROCEDURE — 63600175 PHARM REV CODE 636 W HCPCS

## 2024-12-15 PROCEDURE — 25000003 PHARM REV CODE 250

## 2024-12-15 PROCEDURE — 84100 ASSAY OF PHOSPHORUS: CPT | Performed by: STUDENT IN AN ORGANIZED HEALTH CARE EDUCATION/TRAINING PROGRAM

## 2024-12-15 PROCEDURE — 25000003 PHARM REV CODE 250: Performed by: STUDENT IN AN ORGANIZED HEALTH CARE EDUCATION/TRAINING PROGRAM

## 2024-12-15 PROCEDURE — 63600175 PHARM REV CODE 636 W HCPCS: Performed by: STUDENT IN AN ORGANIZED HEALTH CARE EDUCATION/TRAINING PROGRAM

## 2024-12-15 PROCEDURE — 85027 COMPLETE CBC AUTOMATED: CPT | Performed by: STUDENT IN AN ORGANIZED HEALTH CARE EDUCATION/TRAINING PROGRAM

## 2024-12-15 RX ORDER — SODIUM CHLORIDE 0.9 % (FLUSH) 0.9 %
20 SYRINGE (ML) INJECTION
Status: DISCONTINUED | OUTPATIENT
Start: 2024-12-15 | End: 2024-12-15 | Stop reason: HOSPADM

## 2024-12-15 RX ORDER — GABAPENTIN 300 MG/1
300 CAPSULE ORAL 3 TIMES DAILY
Qty: 90 CAPSULE | Refills: 11 | Status: SHIPPED | OUTPATIENT
Start: 2024-12-15 | End: 2025-12-15

## 2024-12-15 RX ORDER — KETOROLAC TROMETHAMINE 10 MG/1
10 TABLET, FILM COATED ORAL EVERY 6 HOURS
Qty: 20 TABLET | Refills: 0 | Status: SHIPPED | OUTPATIENT
Start: 2024-12-15 | End: 2024-12-17 | Stop reason: SDUPTHER

## 2024-12-15 RX ORDER — SODIUM CHLORIDE 0.9 % (FLUSH) 0.9 %
10 SYRINGE (ML) INJECTION
Status: DISCONTINUED | OUTPATIENT
Start: 2024-12-15 | End: 2024-12-15 | Stop reason: HOSPADM

## 2024-12-15 RX ORDER — HYDROCODONE BITARTRATE AND ACETAMINOPHEN 5; 325 MG/1; MG/1
1 TABLET ORAL EVERY 6 HOURS PRN
Qty: 5 TABLET | Refills: 0 | Status: SHIPPED | OUTPATIENT
Start: 2024-12-15

## 2024-12-15 RX ORDER — HEPARIN 100 UNIT/ML
500 SYRINGE INTRAVENOUS
Status: DISCONTINUED | OUTPATIENT
Start: 2024-12-15 | End: 2024-12-15 | Stop reason: HOSPADM

## 2024-12-15 RX ORDER — ENOXAPARIN SODIUM 100 MG/ML
40 INJECTION SUBCUTANEOUS EVERY 24 HOURS
Qty: 9.6 ML | Refills: 0 | Status: SHIPPED | OUTPATIENT
Start: 2024-12-15 | End: 2025-01-08

## 2024-12-15 RX ADMIN — METOPROLOL SUCCINATE 50 MG: 50 TABLET, EXTENDED RELEASE ORAL at 08:12

## 2024-12-15 RX ADMIN — PANTOPRAZOLE SODIUM 40 MG: 40 TABLET, DELAYED RELEASE ORAL at 08:12

## 2024-12-15 RX ADMIN — KETOROLAC TROMETHAMINE 10 MG: 10 TABLET, FILM COATED ORAL at 12:12

## 2024-12-15 RX ADMIN — GABAPENTIN 300 MG: 300 CAPSULE ORAL at 08:12

## 2024-12-15 RX ADMIN — HYDROMORPHONE HYDROCHLORIDE 0.5 MG: 1 INJECTION, SOLUTION INTRAMUSCULAR; INTRAVENOUS; SUBCUTANEOUS at 01:12

## 2024-12-15 RX ADMIN — MUPIROCIN 1 G: 20 OINTMENT TOPICAL at 09:12

## 2024-12-15 RX ADMIN — POTASSIUM PHOSPHATE, MONOBASIC POTASSIUM PHOSPHATE, DIBASIC 20 MMOL: 224; 236 INJECTION, SOLUTION, CONCENTRATE INTRAVENOUS at 09:12

## 2024-12-15 RX ADMIN — KETOROLAC TROMETHAMINE 10 MG: 10 TABLET, FILM COATED ORAL at 06:12

## 2024-12-15 RX ADMIN — METHOCARBAMOL 500 MG: 500 TABLET ORAL at 06:12

## 2024-12-15 RX ADMIN — SODIUM CHLORIDE, POTASSIUM CHLORIDE, SODIUM LACTATE AND CALCIUM CHLORIDE 500 ML: 600; 310; 30; 20 INJECTION, SOLUTION INTRAVENOUS at 06:12

## 2024-12-15 NOTE — DISCHARGE INSTRUCTIONS
POSTOPERATIVE INSTRUCTIONS FOLLOWING partial hepatectomy    The following are post-operative instructions that will help you to recover from your surgery.  Please read over these instructions carefully and contact us if we can answer any of your questions or concerns.    Dressing/Incision care  Your incision is covered with skin glue. This protects the incision from dirt and bacteria. Please leave in place as it will flake off on its own in about 2 weeks.  You may shower the day after surgery.  Do not take a tub bath and do not soak the surgical site.    Activity   You should be able to return to your regular activities 2 days after your surgery.  However, do not engage in strenuous activities or lifting more than 10 pounds for about 6 weeks.    Medication for pain  You may find that over the counter pain medications may be sufficient for your pain. You should alternate scheduled tylenol and ibuprofen on a schedule for the first few days if you have no contraindications for the medications.  You will be given a prescription for pain medication for more severe pain.  You should not drive or operate machinery while taking these.  Do not exceed more than 4000mg of Tylenol per day. Do not use Tylenol if you are taking a narcotic pain medication that includes it such as OxyContin. Do not exceed more than 3200mg of ibuprofen per day. Please take narcotics with food.  Narcotics can cause, or worse, constipation.  You will need to increase your fluid intake, eat high fiber foods (such as fruits and bran) and make sure that you are up and walking. You may need to take an over the counter stool softener for constipation.    Please report the following:  Temperature greater than 101 degrees  Discharge or bad odor from the wound  Excessive bleeding, such as bloody dressing or extreme bruising  Redness at incision and/or drain sites  Swelling or buildup of fluid around incision  Extreme uncontrolled pain  Inability to tolerate  food or liquids    Additional information  You will need a follow up appointment approximately 2 weeks following your surgery.  If this follow-up appointment has not been made, please call the office at the number listed on the discharge paperwork.

## 2024-12-15 NOTE — SUBJECTIVE & OBJECTIVE
Interval History: No major overnight. Doing well this morning up in the chair. Tolerating her diet. Having bowel movements. Pain controlled on current regimen; needed some dilaudid overnight but otherwise states her pain is the best its been since surgery. Ambulating.     Medications:  Continuous Infusions:  Scheduled Meds:   enoxparin  40 mg Subcutaneous Q24H (prophylaxis, 1700)    gabapentin  300 mg Oral TID    ketorolac  10 mg Oral Q6H    metoprolol succinate  50 mg Oral Daily    mupirocin  1 g Nasal BID    pantoprazole  40 mg Oral Daily     PRN Meds:  Current Facility-Administered Medications:     dextrose 10%, 12.5 g, Intravenous, PRN    dextrose 10%, 25 g, Intravenous, PRN    dextrose, 16 g, Oral, PRN    dextrose, 24 g, Oral, PRN    glucagon (human recombinant), 1 mg, Intramuscular, PRN    HYDROmorphone, 0.5 mg, Intravenous, Q4H PRN    hydrOXYzine, 10 mg, Oral, Q8H PRN    insulin aspart U-100, 0-5 Units, Subcutaneous, QID (AC + HS) PRN    ondansetron, 4 mg, Intravenous, Q12H PRN    prochlorperazine, 5 mg, Intravenous, Q6H PRN     Review of patient's allergies indicates:   Allergen Reactions    Codeine Other (See Comments)     Per patient, causes pancreatitis    Hydrocodone bitartrate     Hydrocodone-acetaminophen Other (See Comments)    Opioids - morphine analogues      Chest pain/Okay w/Fentanyl 50 mcg - received in ED x3 over 6 hours without issues.    Oxycodone      Pancreatitis w/ all oral narctotics    Tramadol hcl      pancreatitis     Objective:     Vital Signs (Most Recent):  Temp: 98.4 °F (36.9 °C) (12/15/24 0732)  Pulse: 109 (12/15/24 0741)  Resp: 14 (12/15/24 0730)  BP: (!) 97/55 (12/15/24 0730)  SpO2: 95 % (12/15/24 0730) Vital Signs (24h Range):  Temp:  [98.3 °F (36.8 °C)-99.5 °F (37.5 °C)] 98.4 °F (36.9 °C)  Pulse:  [] 109  Resp:  [14-28] 14  SpO2:  [95 %-100 %] 95 %  BP: ()/(51-60) 97/55     Weight: 62.6 kg (137 lb 15.8 oz)  Body mass index is 19.8 kg/m².    Intake/Output - Last 3  Shifts         12/13 0700  12/14 0659 12/14 0700  12/15 0659 12/15 0700  12/16 0659    P.O.  120     I.V. (mL/kg)       IV Piggyback       Total Intake(mL/kg)  120 (1.9)     Urine (mL/kg/hr) 800 (0.5) 800 (0.5)     Emesis/NG output       Stool  1     Total Output 800 801     Net -800 -681            Urine Occurrence 2 x               Physical Exam  Vitals reviewed.   Constitutional:       General: She is not in acute distress.     Appearance: Normal appearance. She is not ill-appearing.   HENT:      Head: Normocephalic.   Eyes:      Extraocular Movements: Extraocular movements intact.   Cardiovascular:      Rate and Rhythm: Normal rate.      Pulses: Normal pulses.   Pulmonary:      Effort: Pulmonary effort is normal. No respiratory distress.      Breath sounds: No wheezing.   Abdominal:      General: There is no distension.      Palpations: Abdomen is soft.      Comments: Appropriately TTP. Midline incision c/d/I with overlying dermabond   Musculoskeletal:         General: Normal range of motion.      Cervical back: Normal range of motion.   Skin:     General: Skin is warm.      Capillary Refill: Capillary refill takes less than 2 seconds.   Neurological:      General: No focal deficit present.      Mental Status: She is alert and oriented to person, place, and time.   Psychiatric:         Mood and Affect: Mood normal.          Significant Labs:  I have reviewed all pertinent lab results within the past 24 hours.    Significant Diagnostics:  I have reviewed all pertinent imaging results/findings within the past 24 hours.

## 2024-12-15 NOTE — ASSESSMENT & PLAN NOTE
Collette Castro is a 49 y.o. 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 Now s/p non-anatomic partial hepatectomies x 5  with Dr. Yuan on 12/12/24    - Diet: regular diet today  - mIVF: discontinued; bolus as needed  - Pain: PO robaxin, toradol, and gabapentin w/ breakthrough ordered  - Antibiotics: N/A  - Home Meds: will restart as appropriate  - DVT Prophylaxis: lovenox  - Labs: Daily CBC, CMP, Mg, Ph - replete lytes prn  - PRN nausea control  - Endo: sliding scale insulin  - Aggressive pulmonary toilet: IS, acapella, Xopenex nebs  - OOB/ambulate, PT/OT    Dispo: Continue inpatient care; possibly dc today pending labs

## 2024-12-15 NOTE — DISCHARGE SUMMARY
[facility]  Surgical Oncology  Discharge Summary      Patient Name: Collette Castro  MRN: [id number]  Admission Date: 12/12/2024  Hospital Length of Stay: 3 days  Discharge Date and Time:  12/15/2024 9:08 AM  Attending Physician: Kimo Yuan MD   Discharging Provider: Nasrin Ford MD  Primary Care Provider: Paulette Boone FNP     HPI:  History of metastatic colon cancer to the liver presented on 12/12/24 for surgical removal of liver metastasis.     Procedure(s) (LRB):  EXCISION, LIVER (N/A)     Hospital Course:  Collette Castro presented to [facility] on the morning of 12/12/2024 for non-anatomical liver resection of colon cancer liver mets. The procedure was performed without complication and she was transferred to the floor for further post op care and monitoring. Her postoperative course was uneventful and she progressed according to plan.  Her pain was controlled with a combination of IV and PO multimodal pain medications. Her diet has been advanced throughout her hospital stay. She is now ambulating without assistance, tolerating a regular diet, pain is well controlled with PO pain medication, and she is voiding appropriately. She now meets all criteria for discharge.      Consults:     Significant Diagnostic Studies: N/A    Pending Diagnostic Studies:       Procedure Component Value Units Date/Time    Specimen to Pathology, Surgery General Surgery [[id number]] Collected: 12/12/24 1156    Order Status: Sent Lab Status: In process Updated: 12/12/24 0781    Specimen: Tissue           Final Active Diagnoses:    Diagnosis Date Noted POA    PRINCIPAL PROBLEM:  Malignant neoplasm of sigmoid colon [C18.7] 01/31/2024 Yes      Problems Resolved During this Admission:      Discharged Condition: good    Disposition: Home or Self Care    Follow Up:   Follow-up Information       Kimo Yuan MD Follow up in 2 week(s).    Specialties: Surgical Oncology, General Surgery  Contact  information:  [address]  [facility] [address]  [phone]                           Patient Instructions:      Diet Adult Regular     Lifting restrictions   Order Comments: No lifting greater than 15-20 pounds for 6 weeks from day of surgery.  No pushing/pulling such as vacuuming or raking.  No straining, avoid constipation and take stool softeners as described and laxatives as needed.  No driving while on narcotics and until you can react quickly without pain.     Notify your health care provider if you experience any of the following:  temperature >100.4     Notify your health care provider if you experience any of the following:  persistent nausea and vomiting or diarrhea     Notify your health care provider if you experience any of the following:  severe uncontrolled pain     Notify your health care provider if you experience any of the following:  redness, tenderness, or signs of infection (pain, swelling, redness, odor or green/yellow discharge around incision site)     Notify your health care provider if you experience any of the following:  difficulty breathing or increased cough     No dressing needed     Activity as tolerated     Shower on day dressing removed (No bath)     Medications:  Reconciled Home Medications:      Medication List        START taking these medications      enoxaparin 40 mg/0.4 mL Syrg  Commonly known as: LOVENOX  Inject 0.4 mLs (40 mg total) into the skin Q24H (prophylaxis, 1700). for 24 days     gabapentin 300 MG capsule  Commonly known as: NEURONTIN  Take 1 capsule (300 mg total) by mouth 3 (three) times daily.     HYDROcodone-acetaminophen 5-325 mg per tablet  Commonly known as: NORCO  Take 1 tablet by mouth every 6 (six) hours as needed for Pain.     ketorolac 10 mg tablet  Commonly known as: TORADOL  Take 1 tablet (10 mg total) by mouth every 6 (six) hours. for 5 days            CONTINUE taking these medications      acetaminophen 650 MG Tbsr  Commonly known as:  TYLENOL  Take 1 tablet (650 mg total) by mouth every 8 (eight) hours.     ALPRAZolam 0.5 MG tablet  Commonly known as: XANAX  Take 0.5 mg by mouth 2 (two) times daily as needed for Anxiety.     chlorhexidine 0.12 % solution  Commonly known as: PERIDEX  Use as directed 15 mLs in the mouth or throat 2 (two) times daily.     clobetasoL 0.05 % shampoo  Commonly known as: CLOBEX  Apply topically.     diphenoxylate-atropine 2.5-0.025 mg 2.5-0.025 mg per tablet  Commonly known as: LOMOTIL  Take 2 tablets by mouth 4 (four) times daily.     doxycycline 100 MG Cap  Commonly known as: VIBRAMYCIN  Take 100 mg by mouth once daily.     ibuprofen 600 MG tablet  Commonly known as: ADVIL,MOTRIN  Take 1 tablet (600 mg total) by mouth 3 (three) times daily.     loperamide 2 mg capsule  Commonly known as: IMODIUM  Take 1 capsule (2 mg total) by mouth 4 (four) times daily as needed for Diarrhea (for large volume thin ileostomy output or <1.5L of ileostomy output).     metFORMIN 500 MG ER 24hr tablet  Commonly known as: GLUCOPHAGE-XR  Take 500 mg by mouth 2 (two) times daily with meals.     minocycline 100 MG capsule  Commonly known as: MINOCIN,DYNACIN  Take 100 mg by mouth once daily.     mupirocin 2 % ointment  Commonly known as: BACTROBAN  1 g by Nasal route 2 (two) times daily.     nitrofurantoin (macrocrystal-monohydrate) 100 MG capsule  Commonly known as: MACROBID  Take 100 mg by mouth 2 (two) times daily.     ondansetron 4 MG tablet  Commonly known as: ZOFRAN  Take 1 tablet (4 mg total) by mouth every 6 (six) hours as needed for Nausea.     propranoloL 10 MG tablet  Commonly known as: INDERAL  Take 10 mg by mouth 3 (three) times daily as needed (headache and pain). Therapy not started     sulfacetamide sodium 10 % Clsr  SMARTSIG:Topical Every Evening     TOPROL XL 25 mg 24 hr tablet  Generic drug: metoprolol succinate  Take 25 mg by mouth. 50 mg     tretinoin 0.025 % cream  Commonly known as: RETIN-A  Apply topically nightly.      triamcinolone acetonide 0.1% 0.1 % cream  Commonly known as: KENALOG  Apply topically 2 (two) times daily.     VAGIFEM 10 mcg Tab  Generic drug: estradioL  Place vaginally as needed (twice monthly (no set dates)).              Nasrin Ford MD  Surgical Oncology  [facility]

## 2024-12-15 NOTE — PLAN OF CARE
12/15/24 1017   Post-Acute Status   Post-Acute Authorization Other   Other Status No Post-Acute Service Needs   Discharge Delays None known at this time   Discharge Plan   Discharge Plan A Home with family;Home   Discharge Plan B Home     Patient cleared for discharge from case management standpoint.      Chart and discharge orders reviewed.  Patient discharged home with no further case management needs.        Megha Schaefer, ТАТЬЯНА  - [facility]

## 2024-12-15 NOTE — NURSING
PCU Plan of Care    Patient Dx: Malignant neoplasm of sigmoid colon    Vital Signs (last 12 hours):   Temp:  [98.4 °F (36.9 °C)-98.5 °F (36.9 °C)]   Pulse:  [105-119]   Resp:  [14-16]   BP: ()/(55-65)   SpO2:  [95 %-99 %]      Neuro: AAOx4, Follows Commands, and Moves all extremities spontaneously     Cardiac: normal sinus rhythm    Respiratory: Room Air      Frequent Checks:     Urine Output: Voids Spontaneously        Diet: Regular    Mobility: Up to Chair  and Ambulated in Room ; Assistance Required: Stand-by Assist      Skin:   Patient turned q2h, bony prominences protected, and mattress inflated/working correctly.   Santos Score: 22.    Shift Events:   See flowsheet for further assessment/details.  Family updated on current condition/plan of care, questions answered, and emotional support provided.  MD updated on current condition, vitals, labs, and gtts.        Pt discharged home with . Vitals stable. Minimal complaints of pain. Medications delivered to bedside by Pharmacy.

## 2024-12-15 NOTE — PLAN OF CARE
[facility]  Discharge Final Note    Primary Care Provider: Paulette Boone FNP    Expected Discharge Date: 12/15/2024    Final Discharge Note (most recent)       Final Note - 12/15/24 1020          Final Note    Assessment Type Final Discharge Note     Anticipated Discharge Disposition Home or Self Care     What phone number can be called within the next 1-3 days to see how you are doing after discharge? [phone]        Post-Acute Status    Post-Acute Authorization Other     Other Status No Post-Acute Service Needs     Discharge Delays None known at this time                     Important Message from Medicare             Contact Info       Kimo Yuan MD   Specialty: Surgical Oncology, General Surgery    [address]  [facility] [address]   Phone: [phone]       Next Steps: Follow up in 2 week(s)              Patient medically ready for discharge to home. Family/patient aware of discharge.    Future Appointments   Date Time Provider Department Center   12/30/2024 10:30 AM Kimo Yuan MD [facility] SURGONC [facility]   5/21/2025  9:20 AM SANTIAGO Paris MD [facility] CNRLSG [facility]           Megha Schaefer LMSW   - [facility]

## 2024-12-15 NOTE — PROGRESS NOTES
[facility]  General Surgery  Progress Note    Subjective:     History of Present Illness:  No notes on file    Post-Op Info:  Procedure(s) (LRB):  EXCISION, LIVER (N/A)   3 Days Post-Op     Interval History: No major overnight. Doing well this morning up in the chair. Tolerating her diet. Having bowel movements. Pain controlled on current regimen; needed some dilaudid overnight but otherwise states her pain is the best its been since surgery. Ambulating.     Medications:  Continuous Infusions:  Scheduled Meds:   enoxparin  40 mg Subcutaneous Q24H (prophylaxis, 1700)    gabapentin  300 mg Oral TID    ketorolac  10 mg Oral Q6H    metoprolol succinate  50 mg Oral Daily    mupirocin  1 g Nasal BID    pantoprazole  40 mg Oral Daily     PRN Meds:  Current Facility-Administered Medications:     dextrose 10%, 12.5 g, Intravenous, PRN    dextrose 10%, 25 g, Intravenous, PRN    dextrose, 16 g, Oral, PRN    dextrose, 24 g, Oral, PRN    glucagon (human recombinant), 1 mg, Intramuscular, PRN    HYDROmorphone, 0.5 mg, Intravenous, Q4H PRN    hydrOXYzine, 10 mg, Oral, Q8H PRN    insulin aspart U-100, 0-5 Units, Subcutaneous, QID (AC + HS) PRN    ondansetron, 4 mg, Intravenous, Q12H PRN    prochlorperazine, 5 mg, Intravenous, Q6H PRN     Review of patient's allergies indicates:   Allergen Reactions    Codeine Other (See Comments)     Per patient, causes pancreatitis    Hydrocodone bitartrate     Hydrocodone-acetaminophen Other (See Comments)    Opioids - morphine analogues      Chest pain/Okay w/Fentanyl 50 mcg - received in ED x3 over 6 hours without issues.    Oxycodone      Pancreatitis w/ all oral narctotics    Tramadol hcl      pancreatitis     Objective:     Vital Signs (Most Recent):  Temp: 98.4 °F (36.9 °C) (12/15/24 0732)  Pulse: 109 (12/15/24 0741)  Resp: 14 (12/15/24 0730)  BP: (!) 97/55 (12/15/24 0730)  SpO2: 95 % (12/15/24 0730) Vital Signs (24h Range):  Temp:  [98.3 °F (36.8 °C)-99.5 °F (37.5  °C)] 98.4 °F (36.9 °C)  Pulse:  [] 109  Resp:  [14-28] 14  SpO2:  [95 %-100 %] 95 %  BP: ()/(51-60) 97/55     Weight: 62.6 kg (137 lb 15.8 oz)  Body mass index is 19.8 kg/m².    Intake/Output - Last 3 Shifts         12/13 0700  12/14 0659 12/14 0700  12/15 0659 12/15 0700  12/16 0659    P.O.  120     I.V. (mL/kg)       IV Piggyback       Total Intake(mL/kg)  120 (1.9)     Urine (mL/kg/hr) 800 (0.5) 800 (0.5)     Emesis/NG output       Stool  1     Total Output 800 801     Net -800 -681            Urine Occurrence 2 x               Physical Exam  Vitals reviewed.   Constitutional:       General: She is not in acute distress.     Appearance: Normal appearance. She is not ill-appearing.   HENT:      Head: Normocephalic.   Eyes:      Extraocular Movements: Extraocular movements intact.   Cardiovascular:      Rate and Rhythm: Normal rate.      Pulses: Normal pulses.   Pulmonary:      Effort: Pulmonary effort is normal. No respiratory distress.      Breath sounds: No wheezing.   Abdominal:      General: There is no distension.      Palpations: Abdomen is soft.      Comments: Appropriately TTP. Midline incision c/d/I with overlying dermabond   Musculoskeletal:         General: Normal range of motion.      Cervical back: Normal range of motion.   Skin:     General: Skin is warm.      Capillary Refill: Capillary refill takes less than 2 seconds.   Neurological:      General: No focal deficit present.      Mental Status: She is alert and oriented to person, place, and time.   Psychiatric:         Mood and Affect: Mood normal.          Significant Labs:  I have reviewed all pertinent lab results within the past 24 hours.    Significant Diagnostics:  I have reviewed all pertinent imaging results/findings within the past 24 hours.  Assessment/Plan:     Malignant neoplasm of sigmoid colon  Collette Castro is a 49 y.o. 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 Now s/p non-anatomic partial  hepatectomies x 5  with Dr. Yuan on 12/12/24    - Diet: regular diet today  - mIVF: discontinued; bolus as needed  - Pain: PO robaxin, toradol, and gabapentin w/ breakthrough ordered  - Antibiotics: N/A  - Home Meds: will restart as appropriate  - DVT Prophylaxis: lovenox  - Labs: Daily CBC, CMP, Mg, Ph - replete lytes prn  - PRN nausea control  - Endo: sliding scale insulin  - Aggressive pulmonary toilet: IS, acapella, Xopenex nebs  - OOB/ambulate, PT/OT    Dispo: Continue inpatient care; possibly dc today pending labs            Nasrin Ford MD  General Surgery  [facility] - Surgical Intensive Care

## 2024-12-16 LAB
BLD PROD TYP BPU: NORMAL
BLOOD UNIT EXPIRATION DATE: NORMAL
BLOOD UNIT TYPE CODE: 6200
BLOOD UNIT TYPE: NORMAL
CODING SYSTEM: NORMAL
CROSSMATCH INTERPRETATION: NORMAL
DISPENSE STATUS: NORMAL
NUM UNITS TRANS PACKED RBC: NORMAL

## 2024-12-17 ENCOUNTER — TELEPHONE (OUTPATIENT)
Dept: SURGERY | Facility: CLINIC | Age: 49
End: 2024-12-17
Payer: COMMERCIAL

## 2024-12-17 ENCOUNTER — PATIENT MESSAGE (OUTPATIENT)
Dept: SURGERY | Facility: CLINIC | Age: 49
End: 2024-12-17
Payer: COMMERCIAL

## 2024-12-17 ENCOUNTER — NURSE TRIAGE (OUTPATIENT)
Dept: ADMINISTRATIVE | Facility: CLINIC | Age: 49
End: 2024-12-17
Payer: COMMERCIAL

## 2024-12-17 DIAGNOSIS — C18.9 METASTATIC COLON CANCER TO LIVER: ICD-10-CM

## 2024-12-17 DIAGNOSIS — C78.7 METASTATIC COLON CANCER TO LIVER: Primary | ICD-10-CM

## 2024-12-17 DIAGNOSIS — C18.9 METASTATIC COLON CANCER TO LIVER: Primary | ICD-10-CM

## 2024-12-17 DIAGNOSIS — C78.7 METASTATIC COLON CANCER TO LIVER: ICD-10-CM

## 2024-12-17 RX ORDER — FENTANYL 50 UG/1
1 PATCH TRANSDERMAL
Qty: 3 PATCH | Refills: 0 | Status: SHIPPED | OUTPATIENT
Start: 2024-12-17 | End: 2024-12-17 | Stop reason: SDUPTHER

## 2024-12-17 RX ORDER — METHOCARBAMOL 500 MG/1
500 TABLET, FILM COATED ORAL 4 TIMES DAILY
Qty: 40 TABLET | Refills: 0 | Status: SHIPPED | OUTPATIENT
Start: 2024-12-17 | End: 2024-12-17 | Stop reason: SDUPTHER

## 2024-12-17 RX ORDER — KETOROLAC TROMETHAMINE 10 MG/1
10 TABLET, FILM COATED ORAL EVERY 6 HOURS
Qty: 20 TABLET | Refills: 0 | Status: SHIPPED | OUTPATIENT
Start: 2024-12-17 | End: 2024-12-22

## 2024-12-17 RX ORDER — LIDOCAINE 50 MG/G
1 PATCH TOPICAL DAILY
Qty: 9 PATCH | Refills: 0 | Status: SHIPPED | OUTPATIENT
Start: 2024-12-17 | End: 2024-12-26

## 2024-12-17 RX ORDER — FENTANYL 50 UG/1
1 PATCH TRANSDERMAL
Qty: 5 PATCH | Refills: 0 | Status: SHIPPED | OUTPATIENT
Start: 2024-12-17

## 2024-12-17 RX ORDER — ACETAMINOPHEN 500 MG
1000 TABLET ORAL EVERY 8 HOURS PRN
Qty: 30 TABLET | Refills: 0 | Status: SHIPPED | OUTPATIENT
Start: 2024-12-17

## 2024-12-17 RX ORDER — METHOCARBAMOL 500 MG/1
500 TABLET, FILM COATED ORAL 4 TIMES DAILY
Qty: 40 TABLET | Refills: 0 | Status: SHIPPED | OUTPATIENT
Start: 2024-12-17 | End: 2024-12-27

## 2024-12-17 NOTE — TELEPHONE ENCOUNTER
"Call placed to pt. Informed per Izabel WALLACE, she is not able to prescribe fentanyl pain patch. Pt states she cannot get off the couch. She is taking tylenol, tylenol pm, toradol and gabapentin. Pt states the only thing she can take is sublingual and or topical pain medication. Pt raised her tone of voice and used profanities while expressing her dissatisfaction with her care. She stated "this is poor practice and in my opinion malpractice." Pt inquired if NP is aware of the surgery she just had done. Pt expressed her concern that she was discharged with medication that she is "highly allergic to". Offered pt clinic appt. Pt declined. She stated she cannot get off the couch in order to come to clinic. Instructed her she can proceed to her local ED for pain control. Pt declined. Pt requesting phone call from Izabel WALLACE. Will notify Izabel WALLACE of pt request.  Pt hung up the phone promptly.      "

## 2024-12-17 NOTE — TELEPHONE ENCOUNTER
RX for oxy NORCO, states she cannot get comfortable. States she needs other pain RX. States nurse is unable to help her. She would only like to speak with surgeon. States very unhappy with this situation, number to patient advocate given. Verb understanding.   Reason for Disposition   Caller requesting a CONTROLLED substance prescription refill (e.g., narcotics, ADHD medicines)    Protocols used: Medication Refill and Renewal Call-A-[place]

## 2024-12-17 NOTE — TELEPHONE ENCOUNTER
"Izabel WALLACE notified auth denied for lidocaine patch. Per Izabel, pt can use OTC patch.  Call placed to pt. Advised auth denied for pain patch.  Pt states she has lidocaine patch at home and she needs something stronger. She is requesting fentanyl patch. Pt states she cannot take oral opioids and she is "highly allergic to opioids". She cannot get comfortable and is experiencing pain with all movements.  She is requesting patch to help with pain control for at least a week. She is taking Toradol and gabapentin and took tylenol pm last night to help her get rest. Rates pain 6-7/10. Will notify Izabel WALLACE  of pt request. Pt verbalized understanding.    "

## 2024-12-17 NOTE — TELEPHONE ENCOUNTER
----- Message from Alfa sent at 12/17/2024  2:44 PM CST -----  Type:  Patient Returning Call    Who Called:PT  Who Left Message for Patient:  Does the patient know what this is regarding?:RX CONCERNS  Would the patient rather a call back or a response via [facility]? CALL  Best Call Back Number: [phone]  Additional Information: Pt states she is having some problems with Rx that was ordered and would like a call back from provider asap. Thank you

## 2024-12-17 NOTE — TELEPHONE ENCOUNTER
Returned call. Pt reports pain patch is out of stock at local pharmacy. Pt would like prescription sent to [facility] in [place]. Preferred pharmacy added to pt chart. Will send to Izabel WALLACE. Pt verbalized understanding.

## 2024-12-17 NOTE — TELEPHONE ENCOUNTER
----- Message from Genaro sent at 12/17/2024 12:43 PM CST -----  Regarding: Callback  Contact: [phone]  Patient calling requesting a callback from nurse or provider in regards to pain medication following recent surgery. Please call back as soon as possible.      [id number] - [place], [place] - [address]  [address]  [facility] [address]  Phone: [phone] Fax: [phone]

## 2024-12-18 LAB
FINAL PATHOLOGIC DIAGNOSIS: NORMAL
GROSS: NORMAL
Lab: NORMAL
SUPPLEMENTAL DIAGNOSIS: NORMAL

## 2024-12-19 NOTE — OP NOTE
[facility] - [facility] Intensive Care  Surgery Department  Operative Note       Date of Procedure: 12/12/2024     Procedure: Procedure(s) (LRB):  EXCISION, LIVER (N/A)     Surgeons and Role:     * Kimo Yuan MD - Primary     * Ming Sepulveda MD - Resident - Chief     * Cruz Mandel MD - Consult    Assisting Surgeon: None    Pre-Operative Diagnosis: Metastatic colon cancer to liver [C18.9, C78.7]    Post-Operative Diagnosis:   Same    Anesthesia: General    Operative Findings:   No evidence of distant intraperitoneal metastases   Resection status:  R0/R1 pending final pathology.  No gross disease remaining post dissection.  US findings - all 6 tumors and one ablation cavity identified. S4a lesion very ill-defined on US    Procedures:  Multifocal Hepatectomy (right and left hemiliver) - segment 8 resection; segment 7 resection (2 lesions en bloc); Segment 4b/5 resection (1 resection and one ablation cavity en bloc); additional segment 7 resection; segmebt 4a resection - 22mod  Anatomic Ultrasound of Liver    Estimated Blood Loss (EBL):  150 mL           Indications:  Collette Castro presents for the above procedures.  Risks and benefits were reviewed including bleeding, infection, damage to local structures, cardiovascular and pulmonary complications, need for additional procedures, death, and imponderables.  She understands and gave informed consent to proceed.    Details:  The patient was transported to the operating room and satisfactory anesthesia established.  Preoperative antibiotics were administered.  The patient was placed in the supine position and extremities were padded and protected throughout.  A chamorro catheter was placed. An appropriate timeout was performed.    An upper midline incision is made and the peritoneum inspected. There is no evidence of peritoneal disease. Adhesions are taken down and the incision is expanded. Satisfactory retraction established. The suspensory ligament  of the liver is divided to expose the hepatic veins. The left triangular ligament is divided up to the left hepatic vein. The diaphragmatic attachments of the right liver are divided to the level of the vena cava. The gastrohepatic ligament is divided and the everton hepatis is encircled. Attention is turned to ultrasound, and a full anatomic ultrasound is performed.    With great attention I am able to identify all of the lesions for my clinic note and her MRI.  There are 6 lesions as well as this ablation cavity.  The ablation cavity still suspicious with some nodularity and just adjacent to 1 of her lesions and I elected to resect this as well for completeness.  All of the lesions were either peripheral or easily identifiable on ultrasound except for her segment 4A lesion.  This is extremely subtle and if I did not know something was there an MRI I would never found it.  The borders are very poorly defined.  This is where I know my challenge was going to be in terms of a margin.    We began marking out these lesions and I started in the right posterior liver with the liver rotated over.  There were 2 lesions in segment 7 that are adjacent to each other.  These were marked out on ultrasound and then parenchymal transection was done with monopolar cautery and the LigaSure.  The specimen was sent off as segment 7, 2 lesions.  There was an additional segment 7 lesion more superiorly that I similarly marked out and excised to negative margins and sent this as also segment 7 lesion.  The liver was de rotated and we look into the superior anterior sector.  There is a visible tumor here in segment 8 that is sits on a branch of the right hepatic vein.  This is marked out carefully and we excised this again with cautery and LigaSure, tying branches of the right hepatic vein was necessary.    In segments 4B/5 there was this large ablation cavity which was somewhat nodular and has not adjacent lesion that also straddles 4B/5.   I marked these out to resect them on bloc.  Again performed with cautery and LigaSure, tying any large inflow with 3-0 silk ties.    This leaves this segment 4A lesion which I leave to last because at this point actually have not really found it.  I spent a good deal of time ultrasound and then going back to her MRI my clinic note.  By landmarks I am able to identify the area in question and there was some abnormality here and a small focus of calcification which was very subtle.  The margins were very poorly defined if at all.  I debate perform a large microwave ablation here and in the end elected to aditya out what I think was a generous site.  We resect this with cautery and LigaSure.  I am happy at least palpably with any margin except for posteriorly as I come through what I think is a generous margin based on ultrasound we expose what I suspect is the posterior surface of the tumor.  This is grossly excised and there was no tumor in the liver bed but I expect that this will be a R1 margin on path.  Using the Aqua Mantis probe we pretty aggressively ablate the posterior resection margin here.  I debate taking a little more tissue but we are coming down onto the hilar plate here and I am not sure what an additional mm of heavily cauterized tissue was going to give us.    Hemostasis is assured with combination of monopolar cautery and Aquamantys. The resection lines are packed and then inspected for bile leak and there is none.     The fascia was closed using 0 non-looped PDS in deep bite, shallow advance fashion.  The wound was irrigated and skin closed with Monocryl and dermabond.      All needle, lap, and sponge counts were reported as correct. I communicated the intraoperative findings with the family following the procedure.     This was a complex case that went over and above the technical work required for a standard  resection billed under the appropriate code.  I am attaching a -22 modifier to reflect the  additional 200% hours of work demanded by the multifocal nature of her disease requiring multiple margin negative liver resection in both the right and left liver (5 resections in total, with two resections encompassing multipel lesions), and complex intraoperative decision making.      Implants: * No implants in log *    Specimens:   Specimen (24h ago, onward)      None                    Condition: Good    Disposition: PACU - hemodynamically stable.    Attestation: I was present and scrubbed for the entire procedure.

## 2024-12-26 NOTE — PROGRESS NOTES
Collette returns s/p open multifocal liver resection.      Final Pathologic Diagnosis     1. LIVER, SEGMENT 8, EXCISIONAL BIOPSY:  Consistent with metastatic colorectal adenocarcinoma, 10mm  Resection margin is negative, 8mm    2. LIVER, SEGMENT 7, EXCISIONAL BIOPSY:  Consistent with metastatic colorectal adenocarcinoma, 2 foci, 7mm and 4mm  Resection margin is negative, closest <1mm    3. LIVER, SEGMENT 4B, EXCISIONAL BIOPSY:  Consistent with metastatic colorectal adenocarcinoma, 2 foci, 25mm and 8mm  Resection margin is negative, few cms    4. LIVER, SEGMENT 7, EXCISIONAL BIOPSY:  Consistent with metastatic colorectal adenocarcinoma, 14mm  Tumor involves capsular surface  Resection margin is negative, 1mm    5. LIVER, SEGMENT 4A, EXCISIONAL BIOPSY:  Consistent with metastatic colorectal adenocarcinoma, 9mm  Resection margin is POSITIVE for malignancy         All in all a good result, although this S4a lesion is basically ink on tumor which I predicted. We ablated the posterior resection margin with aquamantys but this was a tumor that was very poorly defined on US and hard to aditya out. We basically only found it because of a single calcification. Interestingly the gross path shows a negative margin, and I would call this R1. I don't think theres any additional therapy to offer here right now, but we should monitor this closely.    She has had her sigmoid resected, and I would call her currently ELICEO. I would like to recheck a CEA as well, since her last level in August here was elevated. She has had a good deal of therapy, and I would favor transitioning to surveillance here w q3mos imaging and labs. We discussed the high potential for recurrence again, both in liver and distant failure, and our potential options depending on the pattern of failure including systemic therapy, resection, locoregional therapy, DARRYL pump and transplant. Discussed w Dr. Mathias re: ctDNA and CEA monitoring, and I will see her back in 3  months with surveillance imaging.      Kimo Yuan MD  Upper GI / Hepatobiliary Surgical Oncology  [facility] [place], [place]  Office: [phone]  Fax: [phone]

## 2024-12-30 ENCOUNTER — DOCUMENTATION ONLY (OUTPATIENT)
Dept: SURGERY | Facility: CLINIC | Age: 49
End: 2024-12-30
Payer: COMMERCIAL

## 2024-12-30 ENCOUNTER — OFFICE VISIT (OUTPATIENT)
Dept: HEMATOLOGY/ONCOLOGY | Facility: CLINIC | Age: 49
End: 2024-12-30
Payer: COMMERCIAL

## 2024-12-30 VITALS
SYSTOLIC BLOOD PRESSURE: 108 MMHG | TEMPERATURE: 98 F | BODY MASS INDEX: 19.32 KG/M2 | HEIGHT: 70 IN | HEART RATE: 83 BPM | DIASTOLIC BLOOD PRESSURE: 62 MMHG | OXYGEN SATURATION: 99 % | RESPIRATION RATE: 16 BRPM | WEIGHT: 134.94 LBS

## 2024-12-30 DIAGNOSIS — C18.9 METASTATIC COLON CANCER TO LIVER: Primary | ICD-10-CM

## 2024-12-30 DIAGNOSIS — C78.7 METASTATIC COLON CANCER TO LIVER: Primary | ICD-10-CM

## 2024-12-30 PROCEDURE — 99024 POSTOP FOLLOW-UP VISIT: CPT | Mod: S$GLB,,, | Performed by: SURGERY

## 2024-12-30 PROCEDURE — 99999 PR PBB SHADOW E&M-EST. PATIENT-LVL IV: CPT | Mod: PBBFAC,,, | Performed by: SURGERY

## 2025-01-03 ENCOUNTER — TELEPHONE (OUTPATIENT)
Dept: SURGERY | Facility: CLINIC | Age: 50
End: 2025-01-03
Payer: COMMERCIAL

## 2025-01-03 NOTE — TELEPHONE ENCOUNTER
Call placed to pt regarding surveillance. Pt reports she sees Dr. Mathias later this month. She is not certain of his plans for imaging and will discuss with him at her appt. Requested pt to notify the office after her appt with Dr. Mathias. Will use imaging per  Dr. Mathias if ordered in 3 month intervals. If not, will order and schedule pt for surveillance imaging. Pt verbalized understanding and thanked this RN for the follow up.

## 2025-02-04 ENCOUNTER — TELEPHONE (OUTPATIENT)
Dept: SURGERY | Facility: CLINIC | Age: 50
End: 2025-02-04
Payer: COMMERCIAL

## 2025-02-04 NOTE — TELEPHONE ENCOUNTER
Call placed to pt.  Pt reports she spoke to Dr. Mathias yesterday and he went over the CT DNA results. She has not been scheduled for any surveillance imaging with Dr. Mathias. Pt states she would like to coordinate imaging and clinic appt on the same day. Will call pt back with appt details once appts are coordinated.

## 2025-02-05 NOTE — TELEPHONE ENCOUNTER
Returned call. Spoke to Imani. Imani stated she has not been able to get a hold of pt and wanted to alert the office. Informed Imani this Rn spoke to pt yesterday and she is doing good. Imani verbalized understanding.

## 2025-02-05 NOTE — TELEPHONE ENCOUNTER
----- Message from Grey sent at 2/5/2025 12:15 PM CST -----  Regarding: Consult/Advisory  Contact: [phone]  Consult/Advisory    Name Of Caller: Jordyn Acosta       Contact Preference: [phone]    Nature of call: Imani Would like to introduce herself and also she can be contacted if there are any needs from the provider for pt or her insurance needs      Please Call to Advise,Thank you.

## 2025-02-07 ENCOUNTER — TELEPHONE (OUTPATIENT)
Dept: SURGERY | Facility: CLINIC | Age: 50
End: 2025-02-07
Payer: COMMERCIAL

## 2025-02-07 DIAGNOSIS — C18.9 METASTATIC COLON CANCER TO LIVER: Primary | ICD-10-CM

## 2025-02-07 DIAGNOSIS — C78.7 METASTATIC COLON CANCER TO LIVER: Primary | ICD-10-CM

## 2025-02-07 NOTE — TELEPHONE ENCOUNTER
Call placed to pt. Provided with 3 month imaging and clinic appt details. Pt reports Dr. Mathias is transferring her care to a new oncologist at [facility]. Pt will update office with new oncologist contact information once she has the info to share. Instructed to notify the office if her oncologist orders imaging prior to her appt and if applicable this RN will request external imaging for her surveillance. Pt verbalized understanding.

## 2025-02-10 ENCOUNTER — PATIENT MESSAGE (OUTPATIENT)
Dept: ADMINISTRATIVE | Facility: OTHER | Age: 50
End: 2025-02-10
Payer: COMMERCIAL

## 2025-02-26 ENCOUNTER — PATIENT MESSAGE (OUTPATIENT)
Dept: HEMATOLOGY/ONCOLOGY | Facility: CLINIC | Age: 50
End: 2025-02-26
Payer: COMMERCIAL

## 2025-03-05 ENCOUNTER — PATIENT MESSAGE (OUTPATIENT)
Dept: HEMATOLOGY/ONCOLOGY | Facility: CLINIC | Age: 50
End: 2025-03-05
Payer: COMMERCIAL

## 2025-03-10 NOTE — TELEPHONE ENCOUNTER
Call placed to pt regarding portal message. Pt reports Dr. Mathias wrote a letter to assist with her [facility] disability claim and now she needs an psychological evaluation because the letter referenced cognitive impairment. Advised her to follow up with Dr. Mathias for referral and if [facility] is preferred, Dr. Mathias can send referral to [facility]. Reviewed appt details on 3/19. Pt verbalized understanding.

## 2025-03-17 ENCOUNTER — PATIENT MESSAGE (OUTPATIENT)
Dept: HEMATOLOGY/ONCOLOGY | Facility: CLINIC | Age: 50
End: 2025-03-17
Payer: COMMERCIAL

## 2025-03-17 ENCOUNTER — PATIENT MESSAGE (OUTPATIENT)
Dept: SURGERY | Facility: CLINIC | Age: 50
End: 2025-03-17
Payer: COMMERCIAL

## 2025-03-19 ENCOUNTER — OFFICE VISIT (OUTPATIENT)
Dept: HEMATOLOGY/ONCOLOGY | Facility: CLINIC | Age: 50
End: 2025-03-19
Payer: COMMERCIAL

## 2025-03-19 ENCOUNTER — HOSPITAL ENCOUNTER (OUTPATIENT)
Dept: RADIOLOGY | Facility: HOSPITAL | Age: 50
Discharge: HOME | End: 2025-03-19
Attending: SURGERY
Payer: COMMERCIAL

## 2025-03-19 VITALS
BODY MASS INDEX: 21 KG/M2 | WEIGHT: 146.38 LBS | SYSTOLIC BLOOD PRESSURE: 115 MMHG | DIASTOLIC BLOOD PRESSURE: 72 MMHG | OXYGEN SATURATION: 98 % | HEART RATE: 91 BPM

## 2025-03-19 DIAGNOSIS — C18.9 METASTATIC COLON CANCER TO LIVER: Primary | ICD-10-CM

## 2025-03-19 DIAGNOSIS — C18.9 METASTATIC COLON CANCER TO LIVER: ICD-10-CM

## 2025-03-19 DIAGNOSIS — C78.7 METASTATIC COLON CANCER TO LIVER: Primary | ICD-10-CM

## 2025-03-19 DIAGNOSIS — C78.7 METASTATIC COLON CANCER TO LIVER: ICD-10-CM

## 2025-03-19 PROCEDURE — 25500020 PHARM REV CODE 255: Mod: PO | Performed by: SURGERY

## 2025-03-19 PROCEDURE — 74177 CT ABD & PELVIS W/CONTRAST: CPT | Mod: 26,,, | Performed by: STUDENT IN AN ORGANIZED HEALTH CARE EDUCATION/TRAINING PROGRAM

## 2025-03-19 PROCEDURE — 99214 OFFICE O/P EST MOD 30 MIN: CPT | Mod: S$GLB,,, | Performed by: SURGERY

## 2025-03-19 PROCEDURE — 74177 CT ABD & PELVIS W/CONTRAST: CPT | Mod: TC,PO

## 2025-03-19 PROCEDURE — 71260 CT THORAX DX C+: CPT | Mod: 26,,, | Performed by: STUDENT IN AN ORGANIZED HEALTH CARE EDUCATION/TRAINING PROGRAM

## 2025-03-19 PROCEDURE — 99999 PR PBB SHADOW E&M-EST. PATIENT-LVL III: CPT | Mod: PBBFAC,,, | Performed by: SURGERY

## 2025-03-19 RX ADMIN — IOHEXOL 75 ML: 350 INJECTION, SOLUTION INTRAVENOUS at 12:03

## 2025-03-19 NOTE — PROGRESS NOTES
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 She has been working up a sigmoid polyp/mass for about a year, with multiple biopsies negative for malignancy despite a high risk clinical lesion. Repeat colonoscopy and biopsies in December finally demonstrated adenocarcinoma.  She has hx uterine cancer at age 24 (1999) managed with preoperative RT and then hysterectomy/lymphadenectomy with ovarian preservation/relocation. Because of this history and her family history she has been getting screening colonoscopy every 3 years.     Family Hx of CRC (Grandfather- age 67)     Colonoscopy 12/23 with 2-3cm ulcerated sessile polyp.       CT/MRI:               On my review I count at least 5 discrete lesions, the largest of which is about 1cm in diameter in the right anterior liver. These are all in the right liver, with a potential 6th lesion on DWI in the left lateral liver. Her imaging reveals no other clear sites of metastatic disease. She has bilateral paracolic gutter "collections" which on my review are actually likely her relocated ovaries.     CEA: 2.2 (wnl)     PET: report and images pending, unfortunately her disc has only partial images     A/P:  Collette is 48, healthy, with hx pelvic RT and a new sigmoid colon cancer with liver metastases clinically. We had a long discussion on the phone recently and reviewed that discussion again here. Plan to start her on systemic therapy 4-6 cycles and review her response, and I have discussed her case with Dr. Mathias. We reviewed our likely options after induction systemic therapy. She has multifocal disease, mostly confined to the right liver. These are all high risk for disappearing lesions on chemo, and she has extremely high risk for in liver recurrence or inadequate surgical/interventional clearance. I see no evidence of extrahepatic disease. We discussed locoregional strategies " including resection, ablation, Y90 and importantly in her case, adjuvant DARRYL pump therapy and the potential role for liver transplant in the future should she have recurrent disease. Of these, given the distribution I think our most likely path is to proceed with primary tumor resection and liver clearance to the best of our ability with placement of DARRYL pump for adjuvant liver directed therapy for residual disease.     RTC after 4 cycles systemic therapy with restaging CT C/A/P panc/liver protocol and labs. She may benefit from MRI as well but will plan to evaluate her CT imaging first. Will obtain her PET for review, and re-present at [facility] conference after restaging.  ----------------  3/19/2025:    Collette returns for her first restaging after multifocal liver resection (12/2024) and sigmoid colectomy (5/2024). She is ELICEO and off systemic therapy.    Repeat CT today (3/19/2025):  mpression:     1. Interval postoperative changes of multifocal hepatic resection without suspicious enhancement at the liver resection margins.  No evidence of disease progression or new metastasis.  2. Decreased size of a left lower lobe pulmonary nodule.  3. Stable subcentimeter cystic lesion in the pancreatic head.  Attention on follow-up.  4. 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  5. Additional findings as above.    CEA: 1.7    A/P  Cont q 3 mos surveillance, consider following ctDNA which was negative by report    I spent 30 minutes with Ms. Castro, with >50% of time spent face to face and additional time preparing to see the patient (eg, review of tests), obtaining and/or reviewing separately obtained history, documenting clinical information in the electronic or other health record, independently interpreting results (not separately reported) and communicating results to the patient/family/caregiver, or Care coordination (not separately reported).           Kimo Yuan MD  Upper  GI / Hepatobiliary Surgical Oncology  [facility] [place], [place]  Office: [phone]  Fax: [phone]

## 2025-05-06 ENCOUNTER — PATIENT MESSAGE (OUTPATIENT)
Dept: SURGERY | Facility: CLINIC | Age: 50
End: 2025-05-06
Payer: COMMERCIAL

## 2025-05-12 ENCOUNTER — PATIENT MESSAGE (OUTPATIENT)
Dept: SURGERY | Facility: CLINIC | Age: 50
End: 2025-05-12
Payer: COMMERCIAL

## 2025-05-12 DIAGNOSIS — L29.0 PRURITUS ANI: Primary | ICD-10-CM

## 2025-05-15 ENCOUNTER — PATIENT MESSAGE (OUTPATIENT)
Dept: HEMATOLOGY/ONCOLOGY | Facility: CLINIC | Age: 50
End: 2025-05-15
Payer: COMMERCIAL

## 2025-05-15 DIAGNOSIS — Z08 ENCOUNTER FOR FOLLOW-UP SURVEILLANCE OF COLON CANCER: Primary | ICD-10-CM

## 2025-05-15 DIAGNOSIS — Z85.038 ENCOUNTER FOR FOLLOW-UP SURVEILLANCE OF COLON CANCER: Primary | ICD-10-CM

## 2025-05-16 RX ORDER — HYDROCORTISONE ACETATE PRAMOXINE HCL 2.5; 1 G/100G; G/100G
CREAM TOPICAL 3 TIMES DAILY
Qty: 30 G | Refills: 1 | Status: SHIPPED | OUTPATIENT
Start: 2025-05-16

## 2025-05-21 ENCOUNTER — TELEPHONE (OUTPATIENT)
Dept: SURGERY | Facility: CLINIC | Age: 50
End: 2025-05-21
Payer: COMMERCIAL

## 2025-05-21 NOTE — TELEPHONE ENCOUNTER
Call placed to pt. Informed pt this RN is waiting to cancel her lab and imaging appts until her labs and CT are scheduled locally. Pt states she will check if her Provider has placed the orders and will notify the office with appt dates and times and then her appts in [place] can be cancelled. Instructed pt to contact the office with any questions or concerns. Pt verbalized understanding.

## 2025-05-28 ENCOUNTER — OFFICE VISIT (OUTPATIENT)
Dept: SURGERY | Facility: CLINIC | Age: 50
End: 2025-05-28
Payer: COMMERCIAL

## 2025-05-28 VITALS
OXYGEN SATURATION: 97 % | WEIGHT: 153 LBS | HEART RATE: 76 BPM | BODY MASS INDEX: 21.9 KG/M2 | DIASTOLIC BLOOD PRESSURE: 82 MMHG | HEIGHT: 70 IN | SYSTOLIC BLOOD PRESSURE: 124 MMHG | RESPIRATION RATE: 17 BRPM | TEMPERATURE: 98 F

## 2025-05-28 DIAGNOSIS — R15.9 FULL INCONTINENCE OF FECES: ICD-10-CM

## 2025-05-28 DIAGNOSIS — Z85.048 ENCOUNTER FOR FOLLOW-UP SURVEILLANCE OF RECTAL CANCER: ICD-10-CM

## 2025-05-28 DIAGNOSIS — R15.1 FECAL SMEARING: Primary | ICD-10-CM

## 2025-05-28 DIAGNOSIS — Z08 ENCOUNTER FOR FOLLOW-UP SURVEILLANCE OF RECTAL CANCER: ICD-10-CM

## 2025-05-28 PROCEDURE — 99999 PR PBB SHADOW E&M-EST. PATIENT-LVL V: CPT | Mod: PBBFAC,,, | Performed by: COLON & RECTAL SURGERY

## 2025-05-28 PROCEDURE — 99215 OFFICE O/P EST HI 40 MIN: CPT | Mod: S$GLB,,, | Performed by: COLON & RECTAL SURGERY

## 2025-05-28 RX ORDER — ERGOCALCIFEROL 1.25 MG/1
50000 CAPSULE ORAL
COMMUNITY
Start: 2025-03-25

## 2025-05-28 NOTE — PROGRESS NOTES
"Complaining of multiple bowel movements twice a week.  Upwards of 5 during the day with marked irritation swelling and bleeding.  On other days she will have formed stools which she can not control.    Recent CT CAP - smaller pulm nodule without treatment  No new liver lesions.      Appears well  /82 (BP Location: Left arm, Patient Position: Sitting)   Pulse 76   Temp 98.1 °F (36.7 °C) (Temporal)   Resp 17   Ht 5' 10" (1.778 m)   Wt 69.4 kg (153 lb)   SpO2 97%   BMI 21.95 kg/m²   Skin anicteric  LN non palpable  Chest symmetric, nl excursions, no retractions.    Abdomen ND soft NT.  No masses.  No organomegaly  Rectal exam:  Nl appearing anus but tone easily overcome with eversion with small gaping   Decreased resting sphincter tone  Poor squeeze  No mass    Assessment  Low anterior resection syndrome  Fecal incontinence, complete, secondary to XRT, partial proctectomy and weak sphincter    Recommendation  Long discussion with pt regarding continence issues and the various factors.    Kegel exercises  High volume enema therapy  Physical therapy  Office visit 6 weeks  Hold on colonoscopy      "

## 2025-05-30 ENCOUNTER — TELEPHONE (OUTPATIENT)
Dept: HEMATOLOGY/ONCOLOGY | Facility: CLINIC | Age: 50
End: 2025-05-30
Payer: COMMERCIAL

## 2025-05-30 NOTE — TELEPHONE ENCOUNTER
Per Dr Ortiz' order for pt to have pelvic floor retraining I reached out to Ashley Cruz PT in [place] and confirmed that she provided this service.   Called pt and verified that she is willing to go to [place] for PT.  Pt agreed and I provided her with the address and phone # [phone].  Faxed face sheet and referral to PT office.  Fax [phone].

## 2025-06-03 ENCOUNTER — PATIENT MESSAGE (OUTPATIENT)
Dept: SURGERY | Facility: CLINIC | Age: 50
End: 2025-06-03
Payer: COMMERCIAL

## 2025-06-03 DIAGNOSIS — L29.0 PRURITUS ANI: ICD-10-CM

## 2025-06-04 ENCOUNTER — PATIENT MESSAGE (OUTPATIENT)
Dept: HEMATOLOGY/ONCOLOGY | Facility: CLINIC | Age: 50
End: 2025-06-04
Payer: COMMERCIAL

## 2025-06-04 RX ORDER — HYDROCORTISONE ACETATE PRAMOXINE HCL 2.5; 1 G/100G; G/100G
CREAM TOPICAL 3 TIMES DAILY
Qty: 30 G | Refills: 4 | Status: SHIPPED | OUTPATIENT
Start: 2025-06-04

## 2025-06-06 ENCOUNTER — DOCUMENTATION ONLY (OUTPATIENT)
Dept: SURGERY | Facility: CLINIC | Age: 50
End: 2025-06-06
Payer: COMMERCIAL

## 2025-06-10 ENCOUNTER — PATIENT MESSAGE (OUTPATIENT)
Dept: SURGERY | Facility: CLINIC | Age: 50
End: 2025-06-10
Payer: COMMERCIAL

## 2025-06-10 ENCOUNTER — TELEPHONE (OUTPATIENT)
Dept: SURGERY | Facility: CLINIC | Age: 50
End: 2025-06-10
Payer: COMMERCIAL

## 2025-06-10 NOTE — TELEPHONE ENCOUNTER
Call placed to [facility]. Left message to request recent office note, labs, radiology report and images. Call back number provided.

## 2025-06-17 ENCOUNTER — TELEPHONE (OUTPATIENT)
Dept: SURGERY | Facility: CLINIC | Age: 50
End: 2025-06-17
Payer: COMMERCIAL

## 2025-06-17 NOTE — TELEPHONE ENCOUNTER
Call placed to pt regarding appt time for appt on 6/18 with Dr. Yuan. Left message requesting call back. Call back number provided.

## 2025-06-17 NOTE — TELEPHONE ENCOUNTER
Call placed to Conchita CHAMBERS RN at [facility] to request medical records. Left detailed message with direct call back.

## 2025-06-17 NOTE — TELEPHONE ENCOUNTER
Call placed to Mellisa Blancas RN at [facility]. Requested recent office note, labs and radiology report. Fax number provided. Mellisa will send requested records shortly. Direct call back provided.

## 2025-06-18 ENCOUNTER — OFFICE VISIT (OUTPATIENT)
Dept: HEMATOLOGY/ONCOLOGY | Facility: CLINIC | Age: 50
End: 2025-06-18
Payer: COMMERCIAL

## 2025-06-18 VITALS
TEMPERATURE: 98 F | HEART RATE: 65 BPM | BODY MASS INDEX: 22.32 KG/M2 | DIASTOLIC BLOOD PRESSURE: 74 MMHG | RESPIRATION RATE: 17 BRPM | WEIGHT: 155.88 LBS | OXYGEN SATURATION: 97 % | HEIGHT: 70 IN | SYSTOLIC BLOOD PRESSURE: 127 MMHG

## 2025-06-18 DIAGNOSIS — C78.7 METASTATIC COLON CANCER TO LIVER: Primary | ICD-10-CM

## 2025-06-18 DIAGNOSIS — C18.9 METASTATIC COLON CANCER TO LIVER: Primary | ICD-10-CM

## 2025-06-18 PROCEDURE — 99213 OFFICE O/P EST LOW 20 MIN: CPT | Mod: S$GLB,,, | Performed by: SURGERY

## 2025-06-18 PROCEDURE — 99999 PR PBB SHADOW E&M-EST. PATIENT-LVL V: CPT | Mod: PBBFAC,,, | Performed by: SURGERY

## 2025-06-18 PROCEDURE — G2211 COMPLEX E/M VISIT ADD ON: HCPCS | Mod: S$GLB,,, | Performed by: SURGERY

## 2025-06-18 NOTE — PROGRESS NOTES
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 She has been working up a sigmoid polyp/mass for about a year, with multiple biopsies negative for malignancy despite a high risk clinical lesion. Repeat colonoscopy and biopsies in December finally demonstrated adenocarcinoma.  She has hx uterine cancer at age 24 (1999) managed with preoperative RT and then hysterectomy/lymphadenectomy with ovarian preservation/relocation. Because of this history and her family history she has been getting screening colonoscopy every 3 years.     Family Hx of CRC (Grandfather- age 67)     Colonoscopy 12/23 with 2-3cm ulcerated sessile polyp.       CT/MRI:               On my review I count at least 5 discrete lesions, the largest of which is about 1cm in diameter in the right anterior liver. These are all in the right liver, with a potential 6th lesion on DWI in the left lateral liver. Her imaging reveals no other clear sites of metastatic disease. She has bilateral paracolic gutter "collections" which on my review are actually likely her relocated ovaries.     CEA: 2.2 (wnl)     PET: report and images pending, unfortunately her disc has only partial images     A/P:  Collette is 48, healthy, with hx pelvic RT and a new sigmoid colon cancer with liver metastases clinically. We had a long discussion on the phone recently and reviewed that discussion again here. Plan to start her on systemic therapy 4-6 cycles and review her response, and I have discussed her case with Dr. Mathias. We reviewed our likely options after induction systemic therapy. She has multifocal disease, mostly confined to the right liver. These are all high risk for disappearing lesions on chemo, and she has extremely high risk for in liver recurrence or inadequate surgical/interventional clearance. I see no evidence of extrahepatic disease. We discussed locoregional strategies " including resection, ablation, Y90 and importantly in her case, adjuvant DARRYL pump therapy and the potential role for liver transplant in the future should she have recurrent disease. Of these, given the distribution I think our most likely path is to proceed with primary tumor resection and liver clearance to the best of our ability with placement of DARRYL pump for adjuvant liver directed therapy for residual disease.     RTC after 4 cycles systemic therapy with restaging CT C/A/P panc/liver protocol and labs. She may benefit from MRI as well but will plan to evaluate her CT imaging first. Will obtain her PET for review, and re-present at [facility] conference after restaging.  ----------------  3/19/2025:     Collette returns for her first restaging after multifocal liver resection (12/2024) and sigmoid colectomy (5/2024). She is ELICEO and off systemic therapy.     Repeat CT today (3/19/2025):  mpression:     1. Interval postoperative changes of multifocal hepatic resection without suspicious enhancement at the liver resection margins.  No evidence of disease progression or new metastasis.  2. Decreased size of a left lower lobe pulmonary nodule.  3. Stable subcentimeter cystic lesion in the pancreatic head.  Attention on follow-up.  4. 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  5. Additional findings as above.     CEA: 1.7     A/P  Cont q 3 mos surveillance, consider following ctDNA which was negative by report  ---------------------  6/18/2025:    Collette is here for 3 month restaging. She has been off systemic therapy for 6 months or so. Repeat CT and CEA wnl.    CT 6/2025:      CEA 1.1    A/P  Cont 3 mos surveillance with labs, imaging    Of note I only had access to her CT report during this visit but her images were uploaded afterwards and I reviewed them same day.  I agree with the read there is no evidence of new metastatic disease.  That continued to comment on decrease in  size of hepatic metastatic lesions but these are all post treatment changes.  Of note she continues to have this fairly sizable left femoral artery pseudoaneurysm.  I know in the past of further her to vascular for evaluation but I do not see at least in our system that she has ever seen anyone.  We will reach out to her and continue to encourage her to follow up with vascular surgery to discuss this finding, I think it is important that it is addressed.    I spent 30 minutes with , with >50% of time spent face to face and additional time preparing to see the patient (eg, review of tests), obtaining and/or reviewing separately obtained history, documenting clinical information in the electronic or other health record, independently interpreting results (not separately reported) and communicating results to the patient/family/caregiver, or Care coordination (not separately reported).     - code applied: patient requires or will require a continuous, longitudinal, and active collaborative plan of care related to this patient's health condition, metastatic colon cancer to liver --the management of which requires the direction of a practitioner with specialized clinical knowledge, skill, and expertise.       Kimo Yuan MD  Upper GI / Hepatobiliary Surgical Oncology  [facility] [place], [place]  Office: [phone]  Fax: [phone]

## 2025-06-18 NOTE — Clinical Note
A I know I have tried to refer her to vascular in the past for this groin pseudoaneurysm but I do not see that she has ever seen anyone.  [facility] I just can not see in [facility] system.  Can we please reach out to her and make sure she has seen a vascular surgeon for this and if not refer to one and really insist that she see someone?

## 2025-06-19 ENCOUNTER — DOCUMENTATION ONLY (OUTPATIENT)
Dept: SURGERY | Facility: CLINIC | Age: 50
End: 2025-06-19
Payer: COMMERCIAL

## 2025-06-19 ENCOUNTER — PATIENT MESSAGE (OUTPATIENT)
Dept: SURGERY | Facility: CLINIC | Age: 50
End: 2025-06-19
Payer: COMMERCIAL

## 2025-06-26 ENCOUNTER — INITIAL CONSULT (OUTPATIENT)
Dept: VASCULAR SURGERY | Facility: CLINIC | Age: 50
End: 2025-06-26
Payer: COMMERCIAL

## 2025-06-26 VITALS
BODY MASS INDEX: 22.41 KG/M2 | TEMPERATURE: 98 F | SYSTOLIC BLOOD PRESSURE: 122 MMHG | WEIGHT: 156.5 LBS | HEIGHT: 70 IN | DIASTOLIC BLOOD PRESSURE: 74 MMHG | HEART RATE: 83 BPM

## 2025-06-26 DIAGNOSIS — I72.4: Primary | ICD-10-CM

## 2025-06-26 PROCEDURE — 99999 PR PBB SHADOW E&M-EST. PATIENT-LVL IV: CPT | Mod: PBBFAC,,, | Performed by: SURGERY

## 2025-06-26 PROCEDURE — 99204 OFFICE O/P NEW MOD 45 MIN: CPT | Mod: S$GLB,,, | Performed by: SURGERY

## 2025-06-26 NOTE — PROGRESS NOTES
Patient is referred in.  See the full note by the resident.  I have met with the resident reviewed the chart and interviewed the patient.  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  At this point I would not intervene on this.  She has other medical issues see the note by Dr. Gutiérrez see the full note by the resident.  At this point I would just observe it.  She gets serial CAT scans.  We will see her back in 6 months following 1 of her CAT scans.  We will then re-evaluate.  If it has grown significantly we will consider operating, however she has had radiation to her pelvis and she does have remnant of cancer in her liver.  Therefore I would just observe her.  I do not believe this is a tremendous risk of rupture at this point.      Again please see the full note by the resident that is summarize his our appointment and our consult for the patient

## 2025-08-12 ENCOUNTER — OFFICE VISIT (OUTPATIENT)
Facility: CLINIC | Age: 50
End: 2025-08-12
Payer: COMMERCIAL

## 2025-08-12 ENCOUNTER — LAB VISIT (OUTPATIENT)
Dept: LAB | Facility: HOSPITAL | Age: 50
End: 2025-08-12
Payer: COMMERCIAL

## 2025-08-12 VITALS
SYSTOLIC BLOOD PRESSURE: 110 MMHG | DIASTOLIC BLOOD PRESSURE: 75 MMHG | BODY MASS INDEX: 23.21 KG/M2 | WEIGHT: 162.13 LBS | HEART RATE: 79 BPM | HEIGHT: 70 IN

## 2025-08-12 DIAGNOSIS — G62.9 PERIPHERAL POLYNEUROPATHY: ICD-10-CM

## 2025-08-12 DIAGNOSIS — G62.89 OTHER POLYNEUROPATHY: Primary | ICD-10-CM

## 2025-08-12 DIAGNOSIS — G62.89 OTHER POLYNEUROPATHY: ICD-10-CM

## 2025-08-12 LAB
FOLATE SERPL-MCNC: 9.4 NG/ML (ref 4–24)
VIT B12 SERPL-MCNC: 311 PG/ML (ref 210–950)

## 2025-08-12 PROCEDURE — 82746 ASSAY OF FOLIC ACID SERUM: CPT

## 2025-08-12 PROCEDURE — 99205 OFFICE O/P NEW HI 60 MIN: CPT | Mod: S$GLB,,, | Performed by: PSYCHIATRY & NEUROLOGY

## 2025-08-12 PROCEDURE — 82175 ASSAY OF ARSENIC: CPT

## 2025-08-12 PROCEDURE — 84425 ASSAY OF VITAMIN B-1: CPT

## 2025-08-12 PROCEDURE — 84207 ASSAY OF VITAMIN B-6: CPT

## 2025-08-12 PROCEDURE — 99417 PROLNG OP E/M EACH 15 MIN: CPT | Mod: S$GLB,,, | Performed by: PSYCHIATRY & NEUROLOGY

## 2025-08-12 PROCEDURE — 82607 VITAMIN B-12: CPT

## 2025-08-12 PROCEDURE — G2211 COMPLEX E/M VISIT ADD ON: HCPCS | Mod: S$GLB,,, | Performed by: PSYCHIATRY & NEUROLOGY

## 2025-08-12 PROCEDURE — 99999 PR PBB SHADOW E&M-EST. PATIENT-LVL III: CPT | Mod: PBBFAC,,, | Performed by: PSYCHIATRY & NEUROLOGY

## 2025-08-12 PROCEDURE — 36415 COLL VENOUS BLD VENIPUNCTURE: CPT

## 2025-08-13 ENCOUNTER — OFFICE VISIT (OUTPATIENT)
Dept: SURGERY | Facility: CLINIC | Age: 50
End: 2025-08-13
Payer: COMMERCIAL

## 2025-08-13 VITALS
SYSTOLIC BLOOD PRESSURE: 113 MMHG | DIASTOLIC BLOOD PRESSURE: 76 MMHG | OXYGEN SATURATION: 99 % | BODY MASS INDEX: 22.98 KG/M2 | RESPIRATION RATE: 17 BRPM | TEMPERATURE: 98 F | HEART RATE: 75 BPM | WEIGHT: 160.5 LBS | HEIGHT: 70 IN

## 2025-08-13 DIAGNOSIS — Z85.048 ENCOUNTER FOR FOLLOW-UP SURVEILLANCE OF RECTAL CANCER: Primary | ICD-10-CM

## 2025-08-13 DIAGNOSIS — Z08 ENCOUNTER FOR FOLLOW-UP SURVEILLANCE OF RECTAL CANCER: Primary | ICD-10-CM

## 2025-08-13 LAB
ADDRESS: NORMAL
ARSENIC BLD-MCNC: <1 NG/ML
ATTENDING PHYSICIAN NAME: NORMAL
CADMIUM BLD-MCNC: <0.2 NG/ML
COUNTY OF RESIDENCE: NORMAL
EMPLOYER NAME: NORMAL
FACILITY PHONE #: NORMAL
HX OF OCCUPATION: NORMAL
LEAD BLDV-MCNC: <1 MCG/DL
M HEALTH CARE PROVIDER PHONE: NORMAL
M PATIENT CITY: NORMAL
MERCURY BLD-MCNC: 2 NG/ML
PHONE #: NORMAL
POSTAL CODE: NORMAL
PROVIDER CITY: NORMAL
PROVIDER POSTAL CODE: NORMAL
PROVIDER STATE: NORMAL
REFER PHYSICIAN ADDR: NORMAL
SPECIMEN SOURCE: NORMAL
STATE OF RESIDENCE: NORMAL

## 2025-08-13 PROCEDURE — 99214 OFFICE O/P EST MOD 30 MIN: CPT | Mod: S$GLB,,, | Performed by: COLON & RECTAL SURGERY

## 2025-08-13 PROCEDURE — 99999 PR PBB SHADOW E&M-EST. PATIENT-LVL IV: CPT | Mod: PBBFAC,,, | Performed by: COLON & RECTAL SURGERY

## 2025-08-14 ENCOUNTER — RESULTS FOLLOW-UP (OUTPATIENT)
Facility: CLINIC | Age: 50
End: 2025-08-14
Payer: COMMERCIAL

## 2025-08-15 ENCOUNTER — TELEPHONE (OUTPATIENT)
Dept: PAIN MEDICINE | Facility: CLINIC | Age: 50
End: 2025-08-15
Payer: COMMERCIAL

## 2025-08-18 LAB
W VITAMIN B1: 61 UG/L
W VITAMIN B6: 6 UG/L

## 2025-08-19 ENCOUNTER — TELEPHONE (OUTPATIENT)
Dept: SURGERY | Facility: CLINIC | Age: 50
End: 2025-08-19
Payer: COMMERCIAL

## 2025-08-20 ENCOUNTER — PATIENT MESSAGE (OUTPATIENT)
Dept: SURGERY | Facility: CLINIC | Age: 50
End: 2025-08-20
Payer: COMMERCIAL

## 2025-08-21 ENCOUNTER — TELEPHONE (OUTPATIENT)
Dept: SURGERY | Facility: HOSPITAL | Age: 50
End: 2025-08-21
Payer: COMMERCIAL

## 2025-08-28 ENCOUNTER — ANESTHESIA EVENT (OUTPATIENT)
Dept: ENDOSCOPY | Facility: HOSPITAL | Age: 50
End: 2025-08-28
Payer: COMMERCIAL

## 2025-08-28 ENCOUNTER — HOSPITAL ENCOUNTER (OUTPATIENT)
Facility: HOSPITAL | Age: 50
Discharge: HOME | End: 2025-08-28
Attending: COLON & RECTAL SURGERY | Admitting: COLON & RECTAL SURGERY
Payer: COMMERCIAL

## 2025-08-28 ENCOUNTER — ANESTHESIA (OUTPATIENT)
Dept: ENDOSCOPY | Facility: HOSPITAL | Age: 50
End: 2025-08-28
Payer: COMMERCIAL

## 2025-08-28 VITALS
HEART RATE: 70 BPM | HEIGHT: 70 IN | TEMPERATURE: 98 F | WEIGHT: 160 LBS | DIASTOLIC BLOOD PRESSURE: 66 MMHG | SYSTOLIC BLOOD PRESSURE: 104 MMHG | BODY MASS INDEX: 22.9 KG/M2 | OXYGEN SATURATION: 99 %

## 2025-08-28 DIAGNOSIS — Z12.11 SCREEN FOR COLON CANCER: ICD-10-CM

## 2025-08-28 PROCEDURE — 37000009 HC ANESTHESIA EA ADD 15 MINS: Mod: PO | Performed by: COLON & RECTAL SURGERY

## 2025-08-28 PROCEDURE — G0105 COLORECTAL SCRN; HI RISK IND: HCPCS | Mod: ,,, | Performed by: COLON & RECTAL SURGERY

## 2025-08-28 PROCEDURE — 63600175 PHARM REV CODE 636 W HCPCS: Mod: PO | Performed by: NURSE ANESTHETIST, CERTIFIED REGISTERED

## 2025-08-28 PROCEDURE — 37000008 HC ANESTHESIA 1ST 15 MINUTES: Mod: PO | Performed by: COLON & RECTAL SURGERY

## 2025-08-28 PROCEDURE — G0105 COLORECTAL SCRN; HI RISK IND: HCPCS | Mod: PO | Performed by: COLON & RECTAL SURGERY

## 2025-08-28 PROCEDURE — 82962 GLUCOSE BLOOD TEST: CPT | Mod: PO | Performed by: COLON & RECTAL SURGERY

## 2025-08-28 PROCEDURE — 63600175 PHARM REV CODE 636 W HCPCS: Mod: PO | Performed by: COLON & RECTAL SURGERY

## 2025-08-28 RX ORDER — SODIUM CHLORIDE 0.9 % (FLUSH) 0.9 %
10 SYRINGE (ML) INJECTION
Status: DISCONTINUED | OUTPATIENT
Start: 2025-08-28 | End: 2025-08-28 | Stop reason: HOSPADM

## 2025-08-28 RX ORDER — COLESEVELAM 180 1/1
1875 TABLET ORAL 2 TIMES DAILY WITH MEALS
Qty: 540 TABLET | Refills: 3 | Status: SHIPPED | OUTPATIENT
Start: 2025-08-28 | End: 2026-08-28

## 2025-08-28 RX ORDER — PROPOFOL 10 MG/ML
VIAL (ML) INTRAVENOUS
Status: DISCONTINUED | OUTPATIENT
Start: 2025-08-28 | End: 2025-08-28

## 2025-08-28 RX ORDER — LIDOCAINE HYDROCHLORIDE 20 MG/ML
INJECTION INTRAVENOUS
Status: DISCONTINUED | OUTPATIENT
Start: 2025-08-28 | End: 2025-08-28

## 2025-08-28 RX ORDER — SODIUM CHLORIDE, SODIUM LACTATE, POTASSIUM CHLORIDE, CALCIUM CHLORIDE 600; 310; 30; 20 MG/100ML; MG/100ML; MG/100ML; MG/100ML
INJECTION, SOLUTION INTRAVENOUS CONTINUOUS
Status: DISCONTINUED | OUTPATIENT
Start: 2025-08-28 | End: 2025-08-28 | Stop reason: HOSPADM

## 2025-08-28 RX ADMIN — PROPOFOL 50 MG: 10 INJECTION, EMULSION INTRAVENOUS at 01:08

## 2025-08-28 RX ADMIN — PROPOFOL 30 MG: 10 INJECTION, EMULSION INTRAVENOUS at 01:08

## 2025-08-28 RX ADMIN — PROPOFOL 100 MG: 10 INJECTION, EMULSION INTRAVENOUS at 01:08

## 2025-08-28 RX ADMIN — SODIUM CHLORIDE, POTASSIUM CHLORIDE, SODIUM LACTATE AND CALCIUM CHLORIDE: 600; 310; 30; 20 INJECTION, SOLUTION INTRAVENOUS at 12:08

## 2025-08-28 RX ADMIN — LIDOCAINE HYDROCHLORIDE 100 MG: 20 INJECTION INTRAVENOUS at 01:08

## 2025-09-01 PROBLEM — Z12.11 SCREEN FOR COLON CANCER: Status: RESOLVED | Noted: 2025-08-28 | Resolved: 2025-09-01

## (undated) DEVICE — SUT 0 54IN COATED VICRYL U

## (undated) DEVICE — DRAPE UINDERBUT GRAD PCH

## (undated) DEVICE — PACK LITHOTOMY I ECLIPSE

## (undated) DEVICE — TIP YANKAUERS BULB NO VENT

## (undated) DEVICE — KIT SAHARA DRAPE DRAW/LIFT

## (undated) DEVICE — SUT MCRYL PLUS 4-0 PS2 27IN

## (undated) DEVICE — SOL ELECTROLUBE ANTI-STIC

## (undated) DEVICE — IRRIGATOR ENDOWRIST XI SUCTION

## (undated) DEVICE — CATH 5FR OPEN END URETERAL

## (undated) DEVICE — SUT SILK 3-0 SH DETACH 30IN

## (undated) DEVICE — SEALER VESSEL EXTEND

## (undated) DEVICE — DRAPE SCOPE PILLOW WARMER

## (undated) DEVICE — PAD K-THERMIA 24IN X 60IN

## (undated) DEVICE — LEGGING CLEAR POLY 2/PACK

## (undated) DEVICE — TOWEL OR DISP STRL BLUE 4/PK

## (undated) DEVICE — SOL NACL 0.9% IV INJ 250ML

## (undated) DEVICE — SYR 30CC LUER LOCK

## (undated) DEVICE — SYR 10CC LUER LOCK

## (undated) DEVICE — KIT GELPORT LAPAROSCOPIC ABD

## (undated) DEVICE — SOL IRR WATER STRL 3000 ML

## (undated) DEVICE — DRAPE COLUMN DAVINCI XI

## (undated) DEVICE — CONTAINER SPECIMEN OR STER 4OZ

## (undated) DEVICE — CLIPPER BLADE MOD 4406 (CAREF)

## (undated) DEVICE — ELECTRODE REM PLYHSV RETURN 9

## (undated) DEVICE — SUT SILK 2-0 STRANDS 30IN

## (undated) DEVICE — STAPLER CONTOUR CRV GRN 40MM

## (undated) DEVICE — GUIDE WIRE MOTION .035 X 150CM

## (undated) DEVICE — LUBRICANT SURGILUBE 2 OZ

## (undated) DEVICE — SUT 2-0 12-18IN SILK

## (undated) DEVICE — ADHESIVE DERMABOND ADVANCED

## (undated) DEVICE — PAD PINK TRENDELENBURG POS XL

## (undated) DEVICE — Device

## (undated) DEVICE — GOWN SURGICAL X-LARGE

## (undated) DEVICE — DRAPE ABDOMINAL TIBURON 14X11

## (undated) DEVICE — TRAY CATH 1-LYR URIMTR 16FR

## (undated) DEVICE — STAPLER ECHELON PWR CIR 29MM

## (undated) DEVICE — SUT 3-0 12-18IN SILK

## (undated) DEVICE — RELOAD CONTOUR 40MM GREEN

## (undated) DEVICE — SPONGE LAP 18X18 PREWASHED

## (undated) DEVICE — DRAPE ARM DAVINCI XI

## (undated) DEVICE — SUT SILK 2-0 SH 18IN BLACK

## (undated) DEVICE — WIRE GD LUB STD 3CM .038 150CM

## (undated) DEVICE — PENCIL ROCKER SWITCH 10FT CORD

## (undated) DEVICE — TRAY GENERAL SURGERY OMC

## (undated) DEVICE — NDL 20GX1-1/2IN IB

## (undated) DEVICE — STAPLER SKIN PROXIMATE WIDE

## (undated) DEVICE — IRRIGATOR ENDOSCOPY DISP.

## (undated) DEVICE — SUT PDS PLUS 1 CTX 36IN

## (undated) DEVICE — DRAPE HALF SURGICAL 40X58IN

## (undated) DEVICE — DRESSING AQUACEL SACRAL 9 X 9

## (undated) DEVICE — SEALER MARYLAND 1 STEP 37CM

## (undated) DEVICE — SUT ETHIBOND EXCEL 0 MO6 18

## (undated) DEVICE — SEALER MARYLAND LATCHING 23CM

## (undated) DEVICE — TRAY SKIN SCRUB WET PREMIUM

## (undated) DEVICE — SEALER AQUAMANTYS 2.3 BIPOLAR

## (undated) DEVICE — CLIP LIGATION MEDIUM CLIPS 1

## (undated) DEVICE — SET TRI-LUMEN FILTERED TUBE

## (undated) DEVICE — SUT PROLENE 4-0 RB-1 BL MO

## (undated) DEVICE — SUT 1 48IN PDS II VIO MONO

## (undated) DEVICE — BLADE SURG CARBON STEEL SZ11

## (undated) DEVICE — SUT 0 18IN SILK BLK BRAIDE

## (undated) DEVICE — PORT ACCESS 5MM W/100MM

## (undated) DEVICE — SYS SEE SHARP SCP ANTIFG LNG

## (undated) DEVICE — SUT SILK 3-0 STRANDS 30IN

## (undated) DEVICE — SPONGE COTTON TRAY 4X4IN

## (undated) DEVICE — OBTURATOR BLADELESS 8MM XI

## (undated) DEVICE — APPLICATOR CHLORAPREP ORN 26ML

## (undated) DEVICE — SUT PROLENE 3-0 SH DA 36 BL

## (undated) DEVICE — GAUZE SPONGE PEANUT STRL

## (undated) DEVICE — SUT PDS II 1 TP-1 VIL

## (undated) DEVICE — PORT AIRSEAL 12/120MM LPI

## (undated) DEVICE — NDL INSUF ULTRA VERESS 120MM

## (undated) DEVICE — SET IRR URLGY 2LINE UNIV SPIKE

## (undated) DEVICE — GOWN SMART IMP BREATHABLE XXLG

## (undated) DEVICE — DRESSING TRANS 4X4 3/4

## (undated) DEVICE — SUT VICRYL PLUS 3-0 SH 18IN

## (undated) DEVICE — TUBING NEPTUNE 2 SMOKE 10IN

## (undated) DEVICE — ELECTRODE EXTENDED BLADE

## (undated) DEVICE — TUBING SUC UNIV W/CONN 12FT

## (undated) DEVICE — SEAL UNIVERSAL 5MM-8MM XI